# Patient Record
Sex: FEMALE | Employment: FULL TIME | URBAN - METROPOLITAN AREA
[De-identification: names, ages, dates, MRNs, and addresses within clinical notes are randomized per-mention and may not be internally consistent; named-entity substitution may affect disease eponyms.]

---

## 2017-02-15 ENCOUNTER — HOSPITAL ENCOUNTER (OUTPATIENT)
Dept: MAMMOGRAPHY | Age: 62
Discharge: HOME | End: 2017-02-15
Attending: OBSTETRICS & GYNECOLOGY
Payer: COMMERCIAL

## 2017-02-15 ENCOUNTER — PATIENT MESSAGE (OUTPATIENT)
Dept: FAMILY MEDICINE CLINIC | Facility: CLINIC | Age: 62
End: 2017-02-15

## 2017-02-15 ENCOUNTER — HOSPITAL ENCOUNTER (OUTPATIENT)
Dept: BONE DENSITY | Age: 62
Discharge: HOME | End: 2017-02-15
Attending: OBSTETRICS & GYNECOLOGY
Payer: COMMERCIAL

## 2017-02-15 DIAGNOSIS — Z13.820 SCREENING FOR OSTEOPOROSIS: ICD-10-CM

## 2017-02-15 DIAGNOSIS — Z12.31 ENCOUNTER FOR SCREENING MAMMOGRAM FOR MALIGNANT NEOPLASM OF BREAST: ICD-10-CM

## 2017-02-15 PROCEDURE — 77080 DXA BONE DENSITY AXIAL: CPT

## 2017-02-15 PROCEDURE — 77067 SCR MAMMO BI INCL CAD: CPT

## 2017-02-16 NOTE — TELEPHONE ENCOUNTER
From: Brandan Landeros  To: Holly Brito DO  Sent: 2/15/2017 9:22 AM CST  Subject: Test Results Question    I just had my mammogram and bone density test today at [facility].  My doctor is not in the [facility] group and I have requested

## 2017-06-26 ENCOUNTER — OFFICE VISIT (OUTPATIENT)
Dept: FAMILY MEDICINE CLINIC | Facility: CLINIC | Age: 62
End: 2017-06-26

## 2017-06-26 VITALS
HEIGHT: 63 IN | SYSTOLIC BLOOD PRESSURE: 120 MMHG | RESPIRATION RATE: 14 BRPM | DIASTOLIC BLOOD PRESSURE: 80 MMHG | WEIGHT: 130 LBS | BODY MASS INDEX: 23.04 KG/M2 | HEART RATE: 74 BPM

## 2017-06-26 DIAGNOSIS — H61.22 IMPACTED CERUMEN OF LEFT EAR: ICD-10-CM

## 2017-06-26 DIAGNOSIS — H69.82 EUSTACHIAN TUBE DYSFUNCTION, LEFT: Primary | ICD-10-CM

## 2017-06-26 PROCEDURE — 99213 OFFICE O/P EST LOW 20 MIN: CPT | Performed by: FAMILY MEDICINE

## 2017-06-26 PROCEDURE — 69210 REMOVE IMPACTED EAR WAX UNI: CPT | Performed by: FAMILY MEDICINE

## 2017-06-26 NOTE — PROGRESS NOTES
HPI:   Thomas Pina is a 63year old female who presents for upper respiratory symptoms for  1  months. Patient reports ear discomfort - left.       Current Outpatient Prescriptions:  spironolactone 25 MG Oral Tab TAKE 1/2 TABLET BY MOUTH DAILY Disp: 4 use: Yes           0.0 oz/week     Comment: wine 3 nights a week         REVIEW OF SYSTEMS:   GENERAL: feels well otherwise  SKIN: no rashes  EYES:denies blurred vision or double vision  HEENT: congested; sore throat, ear pain, facial pain  LUNGS: denies s

## 2017-07-11 RX ORDER — SPIRONOLACTONE 25 MG/1
TABLET ORAL
Qty: 45 TABLET | Refills: 0 | Status: SHIPPED | OUTPATIENT
Start: 2017-07-11 | End: 2017-10-12

## 2017-08-22 ENCOUNTER — OFFICE VISIT (OUTPATIENT)
Dept: FAMILY MEDICINE CLINIC | Facility: CLINIC | Age: 62
End: 2017-08-22

## 2017-08-22 VITALS
WEIGHT: 130 LBS | BODY MASS INDEX: 23.92 KG/M2 | RESPIRATION RATE: 15 BRPM | TEMPERATURE: 98 F | HEIGHT: 61.75 IN | DIASTOLIC BLOOD PRESSURE: 80 MMHG | HEART RATE: 60 BPM | SYSTOLIC BLOOD PRESSURE: 132 MMHG

## 2017-08-22 DIAGNOSIS — J45.20 MILD INTERMITTENT ASTHMA WITHOUT COMPLICATION: ICD-10-CM

## 2017-08-22 DIAGNOSIS — Z00.00 LABORATORY EXAMINATION ORDERED AS PART OF A ROUTINE GENERAL MEDICAL EXAMINATION: ICD-10-CM

## 2017-08-22 DIAGNOSIS — Z00.00 ROUTINE GENERAL MEDICAL EXAMINATION AT A HEALTH CARE FACILITY: Primary | ICD-10-CM

## 2017-08-22 DIAGNOSIS — Z13.89 SCREENING FOR GENITOURINARY CONDITION: ICD-10-CM

## 2017-08-22 LAB
APPEARANCE: CLEAR
BILIRUBIN: NEGATIVE
GLUCOSE (URINE DIPSTICK): NEGATIVE MG/DL
KETONES (URINE DIPSTICK): NEGATIVE MG/DL
LEUKOCYTES: NEGATIVE
MULTISTIX LOT#: NORMAL NUMERIC
NITRITE, URINE: NEGATIVE
OCCULT BLOOD: NEGATIVE
PH, URINE: 8.5 (ref 4.5–8)
PROTEIN (URINE DIPSTICK): NEGATIVE MG/DL
SPECIFIC GRAVITY: 1.01 (ref 1–1.03)
URINE-COLOR: YELLOW
UROBILINOGEN,SEMI-QN: 0.2 MG/DL (ref 0–1.9)

## 2017-08-22 PROCEDURE — 99396 PREV VISIT EST AGE 40-64: CPT | Performed by: FAMILY MEDICINE

## 2017-08-22 PROCEDURE — 81003 URINALYSIS AUTO W/O SCOPE: CPT | Performed by: FAMILY MEDICINE

## 2017-08-22 RX ORDER — FEXOFENADINE HCL 180 MG/1
180 TABLET ORAL DAILY PRN
COMMUNITY

## 2017-08-22 RX ORDER — MULTIVIT-MINS NO.63/IRON/FOLIC 27 MG-1 MG
1 TABLET ORAL DAILY
COMMUNITY
End: 2017-11-16 | Stop reason: ALTCHOICE

## 2017-08-22 NOTE — H&P
HPI:   Gabby Hung is a 63year old female who presents for a complete physical exam. Symptoms: is menopausal. Patient complains of having a hard time with boyfriend's family -- they want nothing to do with her.   Dr. Ad Delcid -- gyne at [facility] Re Take 180 mg by mouth daily as needed. Disp:  Rfl:    Prenatal Vit-Fe Fumarate-FA (M-VIT) Oral Tab Take 1 tablet by mouth daily.  Disp:  Rfl:    SPIRONOLACTONE 25 MG Oral Tab TAKE 1/2 TABLET BY MOUTH DAILY Disp: 45 tablet Rfl: 0   Clobetasol Propionate 0.05 otherwise  SKIN: denies any unusual skin lesions  EYES:denies blurred vision or double vision  HEENT: denies nasal congestion, sinus pain or ST  LUNGS: denies shortness of breath with exertion  CARDIOVASCULAR: denies chest pain on exertion  GI: denies abdo understanding of these issues and agrees to the plan.   Routine general medical examination at a health care facility  (primary encounter diagnosis)  Laboratory examination ordered as part of a routine general medical examination  Mild intermittent asthma w

## 2017-08-31 ENCOUNTER — LAB ENCOUNTER (OUTPATIENT)
Dept: LAB | Age: 62
End: 2017-08-31
Attending: FAMILY MEDICINE
Payer: COMMERCIAL

## 2017-08-31 DIAGNOSIS — Z00.00 LABORATORY EXAMINATION ORDERED AS PART OF A ROUTINE GENERAL MEDICAL EXAMINATION: ICD-10-CM

## 2017-08-31 LAB
25-HYDROXYVITAMIN D (TOTAL): 31.6 NG/ML (ref 30–100)
ALBUMIN SERPL-MCNC: 3.9 G/DL (ref 3.5–4.8)
ALP LIVER SERPL-CCNC: 56 U/L (ref 46–118)
ALT SERPL-CCNC: 20 U/L (ref 14–54)
AST SERPL-CCNC: 15 U/L (ref 15–41)
BASOPHILS # BLD AUTO: 0.06 X10(3) UL (ref 0–0.1)
BASOPHILS NFR BLD AUTO: 0.7 %
BILIRUB SERPL-MCNC: 1.2 MG/DL (ref 0.1–2)
BUN BLD-MCNC: 15 MG/DL (ref 8–20)
CALCIUM BLD-MCNC: 8.9 MG/DL (ref 8.3–10.3)
CHLORIDE: 105 MMOL/L (ref 101–111)
CHOLEST SMN-MCNC: 182 MG/DL (ref ?–200)
CO2: 28 MMOL/L (ref 22–32)
CREAT BLD-MCNC: 0.68 MG/DL (ref 0.55–1.02)
EOSINOPHIL # BLD AUTO: 0.17 X10(3) UL (ref 0–0.3)
EOSINOPHIL NFR BLD AUTO: 1.9 %
ERYTHROCYTE [DISTWIDTH] IN BLOOD BY AUTOMATED COUNT: 12 % (ref 11.5–16)
GLUCOSE BLD-MCNC: 89 MG/DL (ref 70–99)
HCT VFR BLD AUTO: 37.7 % (ref 34–50)
HDLC SERPL-MCNC: 67 MG/DL (ref 45–?)
HDLC SERPL: 2.72 {RATIO} (ref ?–4.44)
HGB BLD-MCNC: 11.9 G/DL (ref 12–16)
IMMATURE GRANULOCYTE COUNT: 0.02 X10(3) UL (ref 0–1)
IMMATURE GRANULOCYTE RATIO %: 0.2 %
LDLC SERPL CALC-MCNC: 99 MG/DL (ref ?–130)
LDLC SERPL-MCNC: 16 MG/DL (ref 5–40)
LYMPHOCYTES # BLD AUTO: 2.59 X10(3) UL (ref 0.9–4)
LYMPHOCYTES NFR BLD AUTO: 29.6 %
M PROTEIN MFR SERPL ELPH: 7.8 G/DL (ref 6.1–8.3)
MCH RBC QN AUTO: 27.6 PG (ref 27–33.2)
MCHC RBC AUTO-ENTMCNC: 31.6 G/DL (ref 31–37)
MCV RBC AUTO: 87.5 FL (ref 81–100)
MONOCYTES # BLD AUTO: 0.68 X10(3) UL (ref 0.1–0.6)
MONOCYTES NFR BLD AUTO: 7.8 %
NEUTROPHIL ABS PRELIM: 5.22 X10 (3) UL (ref 1.3–6.7)
NEUTROPHILS # BLD AUTO: 5.22 X10(3) UL (ref 1.3–6.7)
NEUTROPHILS NFR BLD AUTO: 59.8 %
NONHDLC SERPL-MCNC: 115 MG/DL (ref ?–130)
PLATELET # BLD AUTO: 175 10(3)UL (ref 150–450)
POTASSIUM SERPL-SCNC: 3.8 MMOL/L (ref 3.6–5.1)
RBC # BLD AUTO: 4.31 X10(6)UL (ref 3.8–5.1)
RED CELL DISTRIBUTION WIDTH-SD: 38.6 FL (ref 35.1–46.3)
SODIUM SERPL-SCNC: 140 MMOL/L (ref 136–144)
TRIGLYCERIDES: 80 MG/DL (ref ?–150)
TSI SER-ACNC: 1.83 MIU/ML (ref 0.35–5.5)
WBC # BLD AUTO: 8.7 X10(3) UL (ref 4–13)

## 2017-08-31 PROCEDURE — 36415 COLL VENOUS BLD VENIPUNCTURE: CPT | Performed by: FAMILY MEDICINE

## 2017-08-31 PROCEDURE — 82306 VITAMIN D 25 HYDROXY: CPT | Performed by: FAMILY MEDICINE

## 2017-08-31 PROCEDURE — 80050 GENERAL HEALTH PANEL: CPT | Performed by: FAMILY MEDICINE

## 2017-08-31 PROCEDURE — 80061 LIPID PANEL: CPT | Performed by: FAMILY MEDICINE

## 2017-09-01 PROBLEM — D64.9 ANEMIA: Status: ACTIVE | Noted: 2017-09-01

## 2017-10-05 ENCOUNTER — TELEPHONE (OUTPATIENT)
Dept: FAMILY MEDICINE CLINIC | Facility: CLINIC | Age: 62
End: 2017-10-05

## 2017-10-05 NOTE — TELEPHONE ENCOUNTER
Pt is is becoming a Grandma and her Daughter's Obstetrician is recommending that she have a TDAP Vaccine. She has questions regarding this vaccine. Please advise at [phone]. Thank you.

## 2017-10-05 NOTE — TELEPHONE ENCOUNTER
Left msg to ID VM     Info from [facility]:  You can provide indirect protection to your baby by making sure everyone who is around him is up to date with their whooping cough vaccine.  When your baby’s family members and caregivers get a whooping cough vaccine th

## 2017-10-09 ENCOUNTER — TELEPHONE (OUTPATIENT)
Dept: FAMILY MEDICINE CLINIC | Facility: CLINIC | Age: 62
End: 2017-10-09

## 2017-10-13 RX ORDER — SPIRONOLACTONE 25 MG/1
TABLET ORAL
Qty: 45 TABLET | Refills: 0 | Status: SHIPPED | OUTPATIENT
Start: 2017-10-13 | End: 2018-01-10

## 2017-10-18 ENCOUNTER — NURSE ONLY (OUTPATIENT)
Dept: FAMILY MEDICINE CLINIC | Facility: CLINIC | Age: 62
End: 2017-10-18

## 2017-10-18 DIAGNOSIS — Z23 NEED FOR VACCINATION: ICD-10-CM

## 2017-10-18 PROCEDURE — 90472 IMMUNIZATION ADMIN EACH ADD: CPT | Performed by: FAMILY MEDICINE

## 2017-10-18 PROCEDURE — 90471 IMMUNIZATION ADMIN: CPT | Performed by: FAMILY MEDICINE

## 2017-10-18 PROCEDURE — 90686 IIV4 VACC NO PRSV 0.5 ML IM: CPT | Performed by: FAMILY MEDICINE

## 2017-10-18 PROCEDURE — 90715 TDAP VACCINE 7 YRS/> IM: CPT | Performed by: FAMILY MEDICINE

## 2017-11-16 ENCOUNTER — OFFICE VISIT (OUTPATIENT)
Dept: FAMILY MEDICINE CLINIC | Facility: CLINIC | Age: 62
End: 2017-11-16

## 2017-11-16 VITALS
HEIGHT: 62 IN | OXYGEN SATURATION: 99 % | SYSTOLIC BLOOD PRESSURE: 120 MMHG | DIASTOLIC BLOOD PRESSURE: 72 MMHG | WEIGHT: 130 LBS | RESPIRATION RATE: 18 BRPM | TEMPERATURE: 99 F | BODY MASS INDEX: 23.92 KG/M2 | HEART RATE: 88 BPM

## 2017-11-16 DIAGNOSIS — J01.00 ACUTE MAXILLARY SINUSITIS, RECURRENCE NOT SPECIFIED: Primary | ICD-10-CM

## 2017-11-16 DIAGNOSIS — J01.00 ACUTE MAXILLARY SINUSITIS, RECURRENCE NOT SPECIFIED: ICD-10-CM

## 2017-11-16 PROCEDURE — 99213 OFFICE O/P EST LOW 20 MIN: CPT | Performed by: NURSE PRACTITIONER

## 2017-11-16 RX ORDER — FLUTICASONE PROPIONATE 50 MCG
2 SPRAY, SUSPENSION (ML) NASAL DAILY
Qty: 1 BOTTLE | Refills: 0 | Status: SHIPPED | OUTPATIENT
Start: 2017-11-16 | End: 2018-11-11

## 2017-11-16 RX ORDER — METHYLPREDNISOLONE 4 MG/1
TABLET ORAL
Qty: 1 KIT | Refills: 0 | Status: SHIPPED | OUTPATIENT
Start: 2017-11-16 | End: 2018-08-23 | Stop reason: ALTCHOICE

## 2017-11-16 RX ORDER — AMOXICILLIN AND CLAVULANATE POTASSIUM 875; 125 MG/1; MG/1
1 TABLET, FILM COATED ORAL 2 TIMES DAILY
Qty: 20 TABLET | Refills: 0 | Status: SHIPPED | OUTPATIENT
Start: 2017-11-16 | End: 2017-11-26

## 2017-11-16 RX ORDER — FLUTICASONE PROPIONATE 50 MCG
SPRAY, SUSPENSION (ML) NASAL AS NEEDED
COMMUNITY
End: 2018-08-23

## 2017-11-16 NOTE — PROGRESS NOTES
Darlene Stanley is a 63year old female. HPI:   Patient presents today reporting sinus pressure, sinus headache, slight sore throat, cough and fatigue x5 days. She reports that her symptoms are getting worse.  She has been using her netti-pot, flonase an Social History:  Smoking status: Never Smoker                                                              Smokeless tobacco: Never Used                      Alcohol use: Yes           0.0 oz/week     Comment: wine 3 nights a week        REVIEW OF SYSTEM by Each Nare route daily. methylPREDNISolone (MEDROL) 4 MG Oral Tablet Therapy Pack 1 kit 0      Sig: As directed. Imaging & Consults:  None    Follow Up with:  No follow-up provider specified.      1. Acute maxillary sinusitis, recurrence no

## 2017-11-20 RX ORDER — FLUTICASONE PROPIONATE 50 MCG
SPRAY, SUSPENSION (ML) NASAL
Refills: 0 | OUTPATIENT
Start: 2017-11-20

## 2018-01-10 RX ORDER — SPIRONOLACTONE 25 MG/1
TABLET ORAL
Qty: 45 TABLET | Refills: 1 | Status: SHIPPED | OUTPATIENT
Start: 2018-01-10 | End: 2018-08-23

## 2018-02-16 ENCOUNTER — HOSPITAL ENCOUNTER (OUTPATIENT)
Dept: MAMMOGRAPHY | Age: 63
Discharge: HOME | End: 2018-02-16
Attending: OBSTETRICS & GYNECOLOGY
Payer: COMMERCIAL

## 2018-02-16 DIAGNOSIS — Z12.31 ENCOUNTER FOR SCREENING MAMMOGRAM FOR MALIGNANT NEOPLASM OF BREAST: ICD-10-CM

## 2018-02-16 PROCEDURE — 77063 BREAST TOMOSYNTHESIS BI: CPT | Performed by: OBSTETRICS & GYNECOLOGY

## 2018-02-16 PROCEDURE — 77067 SCR MAMMO BI INCL CAD: CPT | Performed by: OBSTETRICS & GYNECOLOGY

## 2018-08-15 ENCOUNTER — OFFICE VISIT (OUTPATIENT)
Dept: FAMILY MEDICINE CLINIC | Facility: CLINIC | Age: 63
End: 2018-08-15
Payer: COMMERCIAL

## 2018-08-15 VITALS
BODY MASS INDEX: 23.92 KG/M2 | HEIGHT: 62 IN | HEART RATE: 83 BPM | RESPIRATION RATE: 15 BRPM | TEMPERATURE: 98 F | DIASTOLIC BLOOD PRESSURE: 74 MMHG | SYSTOLIC BLOOD PRESSURE: 120 MMHG | WEIGHT: 130 LBS | OXYGEN SATURATION: 98 %

## 2018-08-15 DIAGNOSIS — J06.9 ACUTE URI: Primary | ICD-10-CM

## 2018-08-15 DIAGNOSIS — J04.0 LARYNGITIS: ICD-10-CM

## 2018-08-15 PROCEDURE — 99213 OFFICE O/P EST LOW 20 MIN: CPT | Performed by: FAMILY MEDICINE

## 2018-08-15 NOTE — PROGRESS NOTES
HPI:   oTm Gramajo is a 66year old female who presents for upper respiratory symptoms for  4  days. Patient reports congestion, cough is not keeping pt up at night.       Current Outpatient Prescriptions:  SPIRONOLACTONE 25 MG Oral Tab TAKE 1/2 TABLE status: Never Smoker                                                              Smokeless tobacco: Never Used                      Alcohol use: Yes           0.0 oz/week     Comment: wine 3 nights a week         REVIEW OF SYSTEMS:   GENERAL: feels well o

## 2018-08-17 ENCOUNTER — TELEPHONE (OUTPATIENT)
Dept: FAMILY MEDICINE CLINIC | Facility: CLINIC | Age: 63
End: 2018-08-17

## 2018-08-17 RX ORDER — BENZONATATE 200 MG/1
200 CAPSULE ORAL 3 TIMES DAILY PRN
Qty: 30 CAPSULE | Refills: 0 | Status: SHIPPED | OUTPATIENT
Start: 2018-08-17 | End: 2018-08-23 | Stop reason: ALTCHOICE

## 2018-08-17 NOTE — TELEPHONE ENCOUNTER
Pt calling with an update. Had OV on 8/15. Pt is still coughing and it is keeping her up at night. Pt says it is dry cough with occasionally phlegm. Was wondering if there was anything else that could be given to help with the cough. Please advise.

## 2018-08-23 ENCOUNTER — LAB ENCOUNTER (OUTPATIENT)
Dept: LAB | Age: 63
End: 2018-08-23
Attending: FAMILY MEDICINE
Payer: COMMERCIAL

## 2018-08-23 ENCOUNTER — OFFICE VISIT (OUTPATIENT)
Dept: FAMILY MEDICINE CLINIC | Facility: CLINIC | Age: 63
End: 2018-08-23
Payer: COMMERCIAL

## 2018-08-23 VITALS
RESPIRATION RATE: 14 BRPM | DIASTOLIC BLOOD PRESSURE: 78 MMHG | HEIGHT: 62 IN | HEART RATE: 64 BPM | WEIGHT: 130 LBS | SYSTOLIC BLOOD PRESSURE: 114 MMHG | BODY MASS INDEX: 23.92 KG/M2

## 2018-08-23 DIAGNOSIS — Z13.29 SCREENING FOR ENDOCRINE, METABOLIC AND IMMUNITY DISORDER: ICD-10-CM

## 2018-08-23 DIAGNOSIS — Z00.00 LABORATORY EXAMINATION ORDERED AS PART OF A ROUTINE GENERAL MEDICAL EXAMINATION: ICD-10-CM

## 2018-08-23 DIAGNOSIS — Z13.228 SCREENING FOR ENDOCRINE, METABOLIC AND IMMUNITY DISORDER: ICD-10-CM

## 2018-08-23 DIAGNOSIS — Z00.00 ROUTINE GENERAL MEDICAL EXAMINATION AT A HEALTH CARE FACILITY: Primary | ICD-10-CM

## 2018-08-23 DIAGNOSIS — L30.9 DERMATITIS: ICD-10-CM

## 2018-08-23 DIAGNOSIS — D50.8 IRON DEFICIENCY ANEMIA SECONDARY TO INADEQUATE DIETARY IRON INTAKE: ICD-10-CM

## 2018-08-23 DIAGNOSIS — Z13.0 SCREENING FOR ENDOCRINE, METABOLIC AND IMMUNITY DISORDER: ICD-10-CM

## 2018-08-23 DIAGNOSIS — Z13.89 SCREENING FOR GENITOURINARY CONDITION: ICD-10-CM

## 2018-08-23 LAB
ALBUMIN SERPL-MCNC: 4 G/DL (ref 3.5–4.8)
ALBUMIN/GLOB SERPL: 1 {RATIO} (ref 1–2)
ALP LIVER SERPL-CCNC: 58 U/L (ref 50–130)
ALT SERPL-CCNC: 21 U/L (ref 14–54)
ANION GAP SERPL CALC-SCNC: 9 MMOL/L (ref 0–18)
AST SERPL-CCNC: 18 U/L (ref 15–41)
BASOPHILS # BLD AUTO: 0.05 X10(3) UL (ref 0–0.1)
BASOPHILS NFR BLD AUTO: 0.6 %
BILIRUB SERPL-MCNC: 1 MG/DL (ref 0.1–2)
BILIRUB UR QL STRIP.AUTO: NEGATIVE
BUN BLD-MCNC: 13 MG/DL (ref 8–20)
BUN/CREAT SERPL: 19.7 (ref 10–20)
CALCIUM BLD-MCNC: 8.9 MG/DL (ref 8.3–10.3)
CHLORIDE SERPL-SCNC: 105 MMOL/L (ref 101–111)
CHOLEST SMN-MCNC: 171 MG/DL (ref ?–200)
CLARITY UR REFRACT.AUTO: CLEAR
CO2 SERPL-SCNC: 26 MMOL/L (ref 22–32)
CREAT BLD-MCNC: 0.66 MG/DL (ref 0.55–1.02)
EOSINOPHIL # BLD AUTO: 0.14 X10(3) UL (ref 0–0.3)
EOSINOPHIL NFR BLD AUTO: 1.7 %
ERYTHROCYTE [DISTWIDTH] IN BLOOD BY AUTOMATED COUNT: 12.2 % (ref 11.5–16)
GLOBULIN PLAS-MCNC: 4 G/DL (ref 2.5–4)
GLUCOSE BLD-MCNC: 80 MG/DL (ref 70–99)
GLUCOSE UR STRIP.AUTO-MCNC: NEGATIVE MG/DL
HCT VFR BLD AUTO: 40.3 % (ref 34–50)
HDLC SERPL-MCNC: 53 MG/DL (ref 40–59)
HGB BLD-MCNC: 12.4 G/DL (ref 12–16)
IMMATURE GRANULOCYTE COUNT: 0.04 X10(3) UL (ref 0–1)
IMMATURE GRANULOCYTE RATIO %: 0.5 %
KETONES UR STRIP.AUTO-MCNC: NEGATIVE MG/DL
LDLC SERPL CALC-MCNC: 103 MG/DL (ref ?–100)
LEUKOCYTE ESTERASE UR QL STRIP.AUTO: NEGATIVE
LYMPHOCYTES # BLD AUTO: 2.82 X10(3) UL (ref 0.9–4)
LYMPHOCYTES NFR BLD AUTO: 34.2 %
M PROTEIN MFR SERPL ELPH: 8 G/DL (ref 6.1–8.3)
MCH RBC QN AUTO: 27.1 PG (ref 27–33.2)
MCHC RBC AUTO-ENTMCNC: 30.8 G/DL (ref 31–37)
MCV RBC AUTO: 88.2 FL (ref 81–100)
MONOCYTES # BLD AUTO: 0.54 X10(3) UL (ref 0.1–1)
MONOCYTES NFR BLD AUTO: 6.6 %
NEUTROPHIL ABS PRELIM: 4.65 X10 (3) UL (ref 1.3–6.7)
NEUTROPHILS # BLD AUTO: 4.65 X10(3) UL (ref 1.3–6.7)
NEUTROPHILS NFR BLD AUTO: 56.4 %
NITRITE UR QL STRIP.AUTO: NEGATIVE
NONHDLC SERPL-MCNC: 118 MG/DL (ref ?–130)
OSMOLALITY SERPL CALC.SUM OF ELEC: 289 MOSM/KG (ref 275–295)
PH UR STRIP.AUTO: 7 [PH] (ref 4.5–8)
PLATELET # BLD AUTO: 185 10(3)UL (ref 150–450)
POTASSIUM SERPL-SCNC: 3.8 MMOL/L (ref 3.6–5.1)
PROT UR STRIP.AUTO-MCNC: NEGATIVE MG/DL
RBC # BLD AUTO: 4.57 X10(6)UL (ref 3.8–5.1)
RBC UR QL AUTO: NEGATIVE
RED CELL DISTRIBUTION WIDTH-SD: 39.1 FL (ref 35.1–46.3)
RUBV IGG SER QL: POSITIVE
RUBV IGG SER-ACNC: 287.6 IU/ML (ref 10–?)
SODIUM SERPL-SCNC: 140 MMOL/L (ref 136–144)
SP GR UR STRIP.AUTO: <1.005 (ref 1–1.03)
TRIGL SERPL-MCNC: 73 MG/DL (ref 30–149)
TSI SER-ACNC: 1.45 MIU/ML (ref 0.35–5.5)
UROBILINOGEN UR STRIP.AUTO-MCNC: <2 MG/DL
VLDLC SERPL CALC-MCNC: 15 MG/DL (ref 0–30)
WBC # BLD AUTO: 8.2 X10(3) UL (ref 4–13)

## 2018-08-23 PROCEDURE — 86735 MUMPS ANTIBODY: CPT | Performed by: FAMILY MEDICINE

## 2018-08-23 PROCEDURE — 80061 LIPID PANEL: CPT | Performed by: FAMILY MEDICINE

## 2018-08-23 PROCEDURE — 99396 PREV VISIT EST AGE 40-64: CPT | Performed by: FAMILY MEDICINE

## 2018-08-23 PROCEDURE — 81003 URINALYSIS AUTO W/O SCOPE: CPT | Performed by: FAMILY MEDICINE

## 2018-08-23 PROCEDURE — 80050 GENERAL HEALTH PANEL: CPT | Performed by: FAMILY MEDICINE

## 2018-08-23 PROCEDURE — 36415 COLL VENOUS BLD VENIPUNCTURE: CPT | Performed by: FAMILY MEDICINE

## 2018-08-23 PROCEDURE — 86765 RUBEOLA ANTIBODY: CPT | Performed by: FAMILY MEDICINE

## 2018-08-23 PROCEDURE — 86762 RUBELLA ANTIBODY: CPT | Performed by: FAMILY MEDICINE

## 2018-08-23 RX ORDER — MOMETASONE FUROATE 1 MG/G
1 CREAM TOPICAL DAILY
Qty: 60 G | Refills: 1 | Status: SHIPPED | OUTPATIENT
Start: 2018-08-23 | End: 2019-10-01 | Stop reason: ALTCHOICE

## 2018-08-23 RX ORDER — SPIRONOLACTONE 25 MG/1
TABLET ORAL
Qty: 45 TABLET | Refills: 1 | Status: SHIPPED | OUTPATIENT
Start: 2018-08-23 | End: 2018-12-09

## 2018-08-23 RX ORDER — MELATONIN
325
COMMUNITY
End: 2021-10-12

## 2018-08-23 NOTE — PATIENT INSTRUCTIONS
Tagamet 150 mg 2 times daily (ranitadine)  Zantac  Give it 2-4 weeks. Nexium 40 mg daily (omeprazole)    Cont. Allergy meds.

## 2018-08-23 NOTE — H&P
HPI:   Frdia Pratt is a 66year old female who presents for a complete physical exam. Symptoms: is menopausal. Patient complains of having rash on her arms -- she has been weeding a lot in her yard. Complains of cough for the past 6 months.   Worse a Neutrophil Absolute Prelim 5.22 1.30 - 6.70 x10 (3) uL   Neutrophil Absolute 5.22 1.30 - 6.70 x10(3) uL   Lymphocyte Absolute 2.59 0.90 - 4.00 x10(3) uL   Monocyte Absolute 0.68 (H) 0.10 - 0.60 x10(3) uL   Eosinophil Absolute 0.17 0.00 - 0.30 x10(3) uL 2/13: COLONOSCOPY      Comment: due every 2 years per Dr. Lyman Petties  7/15: COLONOSCOPY  2010 ?: JEFFREY BIOPSY STEREOTACTIC NODULE 1 SITE RIGHT      Comment: benign   1965: TONSILLECTOMY   Family History   Problem Relation Age of Onset   • Asthma Father    • Cancer adenopathy,no bruits; no thyromegaly  BREAST: DEFERRED TO GYNE  LUNGS: clear to auscultation; no rhonchi, rales, or wheezing  CARDIO: RRR without murmur  GI: good BS's,no masses, HSM or tenderness  :DEFERRED TO GYNE   MUSCULOSKELETAL: back is not tender, 2016 -- Dr. Linnell Severin -- every 2 years.   Eye exam -- 12/17  Dental exam -- goes every 4 months  Osteopenia -- deferred meds; discussed diet and exercise at length  Dermatitis–advised Elocon cream for maximum of 2 weeks  Advised shingles vaccine through the phar

## 2018-08-24 LAB
MEV IGG SER IA-ACNC: POSITIVE
MUV IGG SER IA-ACNC: POSITIVE

## 2018-10-10 ENCOUNTER — APPOINTMENT (OUTPATIENT)
Dept: LAB | Age: 63
End: 2018-10-10
Attending: FAMILY MEDICINE
Payer: COMMERCIAL

## 2018-10-10 ENCOUNTER — TELEPHONE (OUTPATIENT)
Dept: FAMILY MEDICINE CLINIC | Facility: CLINIC | Age: 63
End: 2018-10-10

## 2018-10-10 ENCOUNTER — OFFICE VISIT (OUTPATIENT)
Dept: FAMILY MEDICINE CLINIC | Facility: CLINIC | Age: 63
End: 2018-10-10
Payer: COMMERCIAL

## 2018-10-10 VITALS
SYSTOLIC BLOOD PRESSURE: 150 MMHG | WEIGHT: 130 LBS | RESPIRATION RATE: 15 BRPM | DIASTOLIC BLOOD PRESSURE: 82 MMHG | TEMPERATURE: 98 F | HEIGHT: 62 IN | HEART RATE: 90 BPM | BODY MASS INDEX: 23.92 KG/M2 | OXYGEN SATURATION: 98 %

## 2018-10-10 DIAGNOSIS — R68.84 JAW PAIN: ICD-10-CM

## 2018-10-10 DIAGNOSIS — R07.89 CHEST PRESSURE: Primary | ICD-10-CM

## 2018-10-10 DIAGNOSIS — M79.602 LEFT ARM PAIN: ICD-10-CM

## 2018-10-10 DIAGNOSIS — R07.89 CHEST PRESSURE: ICD-10-CM

## 2018-10-10 DIAGNOSIS — I10 ESSENTIAL HYPERTENSION WITH GOAL BLOOD PRESSURE LESS THAN 140/90: ICD-10-CM

## 2018-10-10 PROCEDURE — 36415 COLL VENOUS BLD VENIPUNCTURE: CPT | Performed by: FAMILY MEDICINE

## 2018-10-10 PROCEDURE — 84484 ASSAY OF TROPONIN QUANT: CPT | Performed by: FAMILY MEDICINE

## 2018-10-10 PROCEDURE — 93000 ELECTROCARDIOGRAM COMPLETE: CPT | Performed by: FAMILY MEDICINE

## 2018-10-10 PROCEDURE — 80053 COMPREHEN METABOLIC PANEL: CPT | Performed by: FAMILY MEDICINE

## 2018-10-10 PROCEDURE — 99214 OFFICE O/P EST MOD 30 MIN: CPT | Performed by: FAMILY MEDICINE

## 2018-10-10 RX ORDER — RANITIDINE 150 MG/1
150 CAPSULE ORAL 2 TIMES DAILY
COMMUNITY
End: 2019-10-01 | Stop reason: ALTCHOICE

## 2018-10-10 NOTE — TELEPHONE ENCOUNTER
S/w pt. No current sx's. Reports she had tightness in her chest last night that lasted ~1-2 hrs. Reports it was hard to catch her breath at that time. No radiating pain, no numbness/tingling. No shoulder pain. No diaphoresis. No n/v.   Reports having lou

## 2018-10-10 NOTE — PROGRESS NOTES
Garrett Regalado is a 66year old female. HPI:   Patient states that last Saturday she was noting some chin/jaw discomfort and some pressure in the center of her chest.  She states she has been dealing with a lot of stress.   She had no symptoms on Sunday Take 2,000 Units by mouth daily. Disp:  Rfl:    Balsalazide Disodium (COLAZAL) 750 MG Oral Cap 2,250 mg daily. Disp:  Rfl: 3   Folic Acid 1 MG Oral Tab Take 1 Tab by mouth daily. Disp:  Rfl:      No current facility-administered medications for this visit. REFLEX TO FREE T4   Result Value Ref Range    TSH 1.450 0.350 - 5.500 mIU/mL   URINALYSIS, ROUTINE   Result Value Ref Range    Urine Color Straw Yellow    Clarity Urine Clear Clear    Spec Gravity <1.005 1.001 - 1.030    Glucose Urine Negative Negative mg/ exertion  CARDIOVASCULAR: denies chest pain on exertion; chest pain on Saturday and chin discomfort  GI: denies abdominal pain,denies heartburn  MUSCULOSKELETAL: denies back pain; left arm pain  EXTREMITIES:  No pain or numbness    EXAM:   /82 (BP Lo

## 2018-10-10 NOTE — TELEPHONE ENCOUNTER
1. What are your symptoms? Discomfort in jaw with tightness in chest      2. How long have you been having these symptoms? One day last week and then today      3. Have you done anything already to treat your symptoms?    No    ADDITIONAL INFO:   Transfer

## 2018-12-10 ENCOUNTER — OFFICE VISIT (OUTPATIENT)
Dept: FAMILY MEDICINE CLINIC | Facility: CLINIC | Age: 63
End: 2018-12-10
Payer: COMMERCIAL

## 2018-12-10 VITALS
BODY MASS INDEX: 23.92 KG/M2 | SYSTOLIC BLOOD PRESSURE: 120 MMHG | DIASTOLIC BLOOD PRESSURE: 70 MMHG | RESPIRATION RATE: 15 BRPM | TEMPERATURE: 98 F | OXYGEN SATURATION: 98 % | HEIGHT: 62 IN | HEART RATE: 102 BPM | WEIGHT: 130 LBS

## 2018-12-10 DIAGNOSIS — J01.00 ACUTE NON-RECURRENT MAXILLARY SINUSITIS: Primary | ICD-10-CM

## 2018-12-10 DIAGNOSIS — J45.901 MODERATE ASTHMA WITH ACUTE EXACERBATION, UNSPECIFIED WHETHER PERSISTENT: ICD-10-CM

## 2018-12-10 DIAGNOSIS — R05.9 COUGH: ICD-10-CM

## 2018-12-10 PROCEDURE — 99213 OFFICE O/P EST LOW 20 MIN: CPT | Performed by: FAMILY MEDICINE

## 2018-12-10 RX ORDER — AMOXICILLIN AND CLAVULANATE POTASSIUM 875; 125 MG/1; MG/1
1 TABLET, FILM COATED ORAL 2 TIMES DAILY
Qty: 20 TABLET | Refills: 0 | Status: SHIPPED | OUTPATIENT
Start: 2018-12-10 | End: 2018-12-20

## 2018-12-10 RX ORDER — ALBUTEROL SULFATE 90 UG/1
2 AEROSOL, METERED RESPIRATORY (INHALATION) EVERY 4 HOURS PRN
Qty: 6.7 G | Refills: 2 | Status: SHIPPED | OUTPATIENT
Start: 2018-12-10 | End: 2020-10-01

## 2018-12-10 RX ORDER — BENZONATATE 200 MG/1
200 CAPSULE ORAL 3 TIMES DAILY PRN
Qty: 30 CAPSULE | Refills: 0 | Status: SHIPPED | OUTPATIENT
Start: 2018-12-10 | End: 2019-02-05 | Stop reason: ALTCHOICE

## 2018-12-10 RX ORDER — SPIRONOLACTONE 25 MG/1
TABLET ORAL
Qty: 45 TABLET | Refills: 0 | Status: SHIPPED | OUTPATIENT
Start: 2018-12-10 | End: 2019-06-01

## 2019-01-02 ENCOUNTER — TELEPHONE (OUTPATIENT)
Dept: FAMILY MEDICINE CLINIC | Facility: CLINIC | Age: 64
End: 2019-01-02

## 2019-01-02 NOTE — TELEPHONE ENCOUNTER
Pt should be evaluated tomorrow- with one of the providers if they have openings. Any worsening s/s- to Immediate Care or ER tonight.

## 2019-01-02 NOTE — TELEPHONE ENCOUNTER
Call to pt-advised dr Gabbie Blanchard is out of ofc/returns 1/8.  Advised of jefferson LINCOLN recommendations noted below and explained rationale for hands on current eval.   Offered mult appt options in our ofc tomorrow and reinforced if any new/worsening symptoms befo

## 2019-01-02 NOTE — TELEPHONE ENCOUNTER
Patient was in about 6 weeks ago, Dr. Ni Ace prescribed at that time antibiotics and told her to wait to see if she got better without them. At that time it got better but since it has come back (since Saturday).  Patient is congested, cough, she filled pr

## 2019-01-02 NOTE — TELEPHONE ENCOUNTER
Pt of Dr Ivonne Garza, see note below, pt has been on augmentin x 3 days now, ventolin inhaler working with some relief but pt has hx of asthma and coughing continues. Pt would like to be seen tomorrow as she is too tired today to leave the house.

## 2019-01-03 ENCOUNTER — OFFICE VISIT (OUTPATIENT)
Dept: FAMILY MEDICINE CLINIC | Facility: CLINIC | Age: 64
End: 2019-01-03
Payer: COMMERCIAL

## 2019-01-03 VITALS
BODY MASS INDEX: 23.92 KG/M2 | HEIGHT: 62 IN | TEMPERATURE: 97 F | DIASTOLIC BLOOD PRESSURE: 80 MMHG | SYSTOLIC BLOOD PRESSURE: 120 MMHG | HEART RATE: 80 BPM | RESPIRATION RATE: 16 BRPM | WEIGHT: 130 LBS

## 2019-01-03 DIAGNOSIS — J20.9 ACUTE BRONCHITIS WITH BRONCHOSPASM: Primary | ICD-10-CM

## 2019-01-03 PROCEDURE — 99214 OFFICE O/P EST MOD 30 MIN: CPT | Performed by: FAMILY MEDICINE

## 2019-01-03 RX ORDER — PREDNISONE 20 MG/1
40 TABLET ORAL DAILY
Qty: 10 TABLET | Refills: 0 | Status: SHIPPED | OUTPATIENT
Start: 2019-01-03 | End: 2019-01-08

## 2019-01-03 NOTE — PROGRESS NOTES
HPI:   Navjot Vergara is a 66year old female who presents for upper respiratory symptoms for  4  days. Patient reports congestion, dry cough, prior history of bronchitis, prior history of sinusitis.   Patient is currently on treatment with Augmentin for 2017   •  delivery, without mention of indication, unspecified as to episode of care(359.76)    • Elevated blood pressure reading without diagnosis of hypertension    • Esophageal reflux    • Palpitations    • Personal history of urinary (tract who presents with Bronchitis with Bronchospasm. PLAN: Start prednisone 40mg daily x 5 days and finish course of augmentin. Consider double coverage with zpak if fevers develop or cough worsens  Saline Rinse. Tylenol or motrin as needed.   Antihistamine pr

## 2019-02-05 ENCOUNTER — OFFICE VISIT (OUTPATIENT)
Dept: FAMILY MEDICINE CLINIC | Facility: CLINIC | Age: 64
End: 2019-02-05
Payer: COMMERCIAL

## 2019-02-05 VITALS
DIASTOLIC BLOOD PRESSURE: 70 MMHG | WEIGHT: 130 LBS | HEART RATE: 102 BPM | SYSTOLIC BLOOD PRESSURE: 112 MMHG | BODY MASS INDEX: 23.92 KG/M2 | TEMPERATURE: 103 F | OXYGEN SATURATION: 98 % | HEIGHT: 62 IN | RESPIRATION RATE: 16 BRPM

## 2019-02-05 DIAGNOSIS — J18.9 PNEUMONIA OF LEFT LOWER LOBE DUE TO INFECTIOUS ORGANISM: Primary | ICD-10-CM

## 2019-02-05 DIAGNOSIS — R05.9 COUGH: ICD-10-CM

## 2019-02-05 DIAGNOSIS — R09.89 LUNG CRACKLES: ICD-10-CM

## 2019-02-05 PROCEDURE — 94640 AIRWAY INHALATION TREATMENT: CPT | Performed by: FAMILY MEDICINE

## 2019-02-05 PROCEDURE — 99214 OFFICE O/P EST MOD 30 MIN: CPT | Performed by: FAMILY MEDICINE

## 2019-02-05 RX ORDER — AZITHROMYCIN 250 MG/1
TABLET, FILM COATED ORAL
Qty: 6 TABLET | Refills: 0 | Status: SHIPPED | OUTPATIENT
Start: 2019-02-05 | End: 2019-03-28 | Stop reason: ALTCHOICE

## 2019-02-05 RX ORDER — ALBUTEROL SULFATE 2.5 MG/3ML
2.5 SOLUTION RESPIRATORY (INHALATION) ONCE
Status: COMPLETED | OUTPATIENT
Start: 2019-02-05 | End: 2019-02-05

## 2019-02-05 RX ORDER — METHYLPREDNISOLONE 4 MG/1
TABLET ORAL
Qty: 1 KIT | Refills: 0 | Status: SHIPPED | OUTPATIENT
Start: 2019-02-05 | End: 2019-03-28 | Stop reason: ALTCHOICE

## 2019-02-05 RX ORDER — PROMETHAZINE HYDROCHLORIDE AND CODEINE PHOSPHATE 6.25; 1 MG/5ML; MG/5ML
5 SYRUP ORAL 2 TIMES DAILY PRN
Qty: 118 ML | Refills: 0 | Status: SHIPPED | OUTPATIENT
Start: 2019-02-05 | End: 2019-04-05

## 2019-02-05 RX ADMIN — ALBUTEROL SULFATE 2.5 MG: 2.5 SOLUTION RESPIRATORY (INHALATION) at 11:01:00

## 2019-02-05 NOTE — PROGRESS NOTES
HPI:   Love Perkins is a 60year old female who presents for upper respiratory symptoms for  3  days. Patient reports congestion, fever with Tmax to 103, OTC cold meds have not been helping, prior history of bronchitis.       Current Outpatient Medicat • Palpitations    • Personal history of urinary (tract) infection    • Unspecified asthma(493.90)    • Unspecified sinusitis (chronic)       Past Surgical History:   Procedure Laterality Date   •      • COLONOSCOPY  05/14/10;     due directed. Saline Rinse. Tylenol or motrin as needed. Antihistamine prn. Fluids. Probiotics advised. Take abx with food. Side effects discussed. Deferred influenza swab.    Deferred xray of chest.     The patient indicates understanding of these issu

## 2019-02-05 NOTE — PATIENT INSTRUCTIONS
Take zpak as directed. Take medrol dose pack as directed. Take probiotic daily and for 2 weeks after antibiotics. Fluids.   If using dayquil/nyquil -- check -- I believe it has acetaminophen/Tylenol in it and may alternated with motrin every 4 hours as n

## 2019-02-07 ENCOUNTER — TELEPHONE (OUTPATIENT)
Dept: FAMILY MEDICINE CLINIC | Facility: CLINIC | Age: 64
End: 2019-02-07

## 2019-02-07 NOTE — TELEPHONE ENCOUNTER
To call pt back and she states that since she started moving around at work, she feels a little better with her back. Pt denies any UTI symptoms.   Per pt she will monitor for now and will call back if symptoms worsen or pr persist.

## 2019-02-07 NOTE — TELEPHONE ENCOUNTER
Patient states that she was diagnosed with Pneumonia. She now have lower back pain. States it is 5 or 6 out of 10. She is wondering if this is related to the pneumonia? Please advise thank you.

## 2019-03-07 ENCOUNTER — HOSPITAL ENCOUNTER (OUTPATIENT)
Dept: MAMMOGRAPHY | Age: 64
Discharge: HOME | End: 2019-03-07
Attending: OBSTETRICS & GYNECOLOGY
Payer: COMMERCIAL

## 2019-03-07 ENCOUNTER — HOSPITAL ENCOUNTER (OUTPATIENT)
Dept: BONE DENSITY | Age: 64
Discharge: HOME | End: 2019-03-07
Attending: OBSTETRICS & GYNECOLOGY
Payer: COMMERCIAL

## 2019-03-07 DIAGNOSIS — M85.9 DISORDER OF BONE DENSITY AND STRUCTURE, UNSPECIFIED: ICD-10-CM

## 2019-03-07 DIAGNOSIS — Z12.39 BREAST CANCER SCREENING: ICD-10-CM

## 2019-03-07 DIAGNOSIS — M85.80 OSTEOPENIA: ICD-10-CM

## 2019-03-07 PROCEDURE — 77063 BREAST TOMOSYNTHESIS BI: CPT | Performed by: OBSTETRICS & GYNECOLOGY

## 2019-03-07 PROCEDURE — 77080 DXA BONE DENSITY AXIAL: CPT | Performed by: OBSTETRICS & GYNECOLOGY

## 2019-03-07 PROCEDURE — 77067 SCR MAMMO BI INCL CAD: CPT | Performed by: OBSTETRICS & GYNECOLOGY

## 2019-03-28 ENCOUNTER — OFFICE VISIT (OUTPATIENT)
Dept: FAMILY MEDICINE CLINIC | Facility: CLINIC | Age: 64
End: 2019-03-28
Payer: COMMERCIAL

## 2019-03-28 VITALS
SYSTOLIC BLOOD PRESSURE: 128 MMHG | HEART RATE: 84 BPM | DIASTOLIC BLOOD PRESSURE: 82 MMHG | WEIGHT: 130 LBS | OXYGEN SATURATION: 99 % | TEMPERATURE: 99 F | HEIGHT: 62 IN | RESPIRATION RATE: 16 BRPM | BODY MASS INDEX: 23.92 KG/M2

## 2019-03-28 DIAGNOSIS — H61.22 IMPACTED CERUMEN OF LEFT EAR: ICD-10-CM

## 2019-03-28 DIAGNOSIS — Z71.85 VACCINE COUNSELING: ICD-10-CM

## 2019-03-28 DIAGNOSIS — R05.9 COUGH: Primary | ICD-10-CM

## 2019-03-28 DIAGNOSIS — J00 ACUTE RHINITIS: ICD-10-CM

## 2019-03-28 PROCEDURE — 99213 OFFICE O/P EST LOW 20 MIN: CPT | Performed by: FAMILY MEDICINE

## 2019-03-28 RX ORDER — PROMETHAZINE HYDROCHLORIDE AND CODEINE PHOSPHATE 6.25; 1 MG/5ML; MG/5ML
5 SYRUP ORAL NIGHTLY PRN
Qty: 118 ML | Refills: 0 | Status: SHIPPED | OUTPATIENT
Start: 2019-03-28 | End: 2019-04-05

## 2019-03-28 NOTE — PROGRESS NOTES
HPI:   Zulema Reeder is a 60year old female who presents for upper respiratory symptoms for  4  days. Patient reports congestion, dry cough.       Current Outpatient Medications:  promethazine-codeine 6.25-10 MG/5ML Oral Syrup Take 5 mL by mouth nightl Laterality Date   •      • COLONOSCOPY  05/14/10;     due every 2 years per Dr. Lawanda Boast   • COLONOSCOPY  7/15   • JEFFREY BIOPSY STEREOTACTIC NODULE 1 SITE RIGHT   ?     benign    • TONSILLECTOMY        Family History   Problem Relatio monitor    Cough  (primary encounter diagnosis)  Acute rhinitis  Impacted cerumen of left ear  Vaccine counseling    No orders of the defined types were placed in this encounter.       Meds & Refills for this Visit:  Requested Prescriptions     Signed Presc

## 2019-04-05 ENCOUNTER — LAB ENCOUNTER (OUTPATIENT)
Dept: LAB | Age: 64
End: 2019-04-05
Attending: NURSE PRACTITIONER
Payer: COMMERCIAL

## 2019-04-05 ENCOUNTER — OFFICE VISIT (OUTPATIENT)
Dept: FAMILY MEDICINE CLINIC | Facility: CLINIC | Age: 64
End: 2019-04-05
Payer: COMMERCIAL

## 2019-04-05 ENCOUNTER — LAB ENCOUNTER (OUTPATIENT)
Dept: LAB | Facility: HOSPITAL | Age: 64
End: 2019-04-05
Attending: NURSE PRACTITIONER
Payer: COMMERCIAL

## 2019-04-05 ENCOUNTER — TELEPHONE (OUTPATIENT)
Dept: FAMILY MEDICINE CLINIC | Facility: CLINIC | Age: 64
End: 2019-04-05

## 2019-04-05 VITALS
DIASTOLIC BLOOD PRESSURE: 70 MMHG | WEIGHT: 130 LBS | SYSTOLIC BLOOD PRESSURE: 102 MMHG | TEMPERATURE: 98 F | HEIGHT: 62 IN | BODY MASS INDEX: 23.92 KG/M2 | RESPIRATION RATE: 16 BRPM | HEART RATE: 88 BPM

## 2019-04-05 DIAGNOSIS — K21.9 GASTROESOPHAGEAL REFLUX DISEASE, ESOPHAGITIS PRESENCE NOT SPECIFIED: ICD-10-CM

## 2019-04-05 DIAGNOSIS — R19.7 DIARRHEA, UNSPECIFIED TYPE: Primary | ICD-10-CM

## 2019-04-05 DIAGNOSIS — R19.7 DIARRHEA, UNSPECIFIED TYPE: ICD-10-CM

## 2019-04-05 DIAGNOSIS — R63.0 LOSS OF APPETITE: ICD-10-CM

## 2019-04-05 PROCEDURE — 87272 CRYPTOSPORIDIUM AG IF: CPT

## 2019-04-05 PROCEDURE — 87045 FECES CULTURE AEROBIC BACT: CPT

## 2019-04-05 PROCEDURE — 87329 GIARDIA AG IA: CPT

## 2019-04-05 PROCEDURE — 87209 SMEAR COMPLEX STAIN: CPT

## 2019-04-05 PROCEDURE — 85025 COMPLETE CBC W/AUTO DIFF WBC: CPT | Performed by: NURSE PRACTITIONER

## 2019-04-05 PROCEDURE — 87046 STOOL CULTR AEROBIC BACT EA: CPT

## 2019-04-05 PROCEDURE — 99214 OFFICE O/P EST MOD 30 MIN: CPT | Performed by: NURSE PRACTITIONER

## 2019-04-05 PROCEDURE — 36415 COLL VENOUS BLD VENIPUNCTURE: CPT | Performed by: NURSE PRACTITIONER

## 2019-04-05 PROCEDURE — 82272 OCCULT BLD FECES 1-3 TESTS: CPT

## 2019-04-05 PROCEDURE — 87177 OVA AND PARASITES SMEARS: CPT

## 2019-04-05 PROCEDURE — 85652 RBC SED RATE AUTOMATED: CPT | Performed by: NURSE PRACTITIONER

## 2019-04-05 PROCEDURE — 87493 C DIFF AMPLIFIED PROBE: CPT

## 2019-04-05 PROCEDURE — 87427 SHIGA-LIKE TOXIN AG IA: CPT

## 2019-04-05 PROCEDURE — 80053 COMPREHEN METABOLIC PANEL: CPT | Performed by: NURSE PRACTITIONER

## 2019-04-05 NOTE — PROGRESS NOTES
Scott Fall is a 60year old female. HPI:   Patient presents today reporting on/off reflux and diarrhea for the past 3 days. Patient reports that she woke up on 4/3/19 with abdominal cramping and diarrhea as well as feeling chilled and fatigued.  She Rfl:    Cholecalciferol (VITAMIN D) 2000 UNITS Oral Cap Take 2,000 Units by mouth daily. Disp:  Rfl:    Balsalazide Disodium (COLAZAL) 750 MG Oral Cap 2,250 mg daily. Disp:  Rfl: 3   Folic Acid 1 MG Oral Tab Take 1 Tab by mouth daily.  Disp:  Rfl:    [place] to palpation  LUNGS: clear to auscultation no rales, rhonchi or wheezes  CARDIO: RRR without murmur  GI: Normal bowel sounds ×4 quadrant, no hepatosplenomegaly or masses, and no tenderness   EXTREMITIES: no cyanosis, clubbing or edema  Musculoskeletal: No symptoms worsen or fail to improve--await labs/stool studies for further recommendations.

## 2019-04-11 ENCOUNTER — APPOINTMENT (OUTPATIENT)
Dept: LAB | Age: 64
End: 2019-04-11
Attending: NURSE PRACTITIONER
Payer: COMMERCIAL

## 2019-04-11 DIAGNOSIS — R77.1 ELEVATED SERUM GLOBULIN LEVEL: ICD-10-CM

## 2019-04-11 DIAGNOSIS — R77.8 ELEVATED TOTAL PROTEIN: ICD-10-CM

## 2019-04-11 PROCEDURE — 83883 ASSAY NEPHELOMETRY NOT SPEC: CPT | Performed by: NURSE PRACTITIONER

## 2019-04-11 PROCEDURE — 84165 PROTEIN E-PHORESIS SERUM: CPT | Performed by: NURSE PRACTITIONER

## 2019-04-11 PROCEDURE — 86334 IMMUNOFIX E-PHORESIS SERUM: CPT | Performed by: NURSE PRACTITIONER

## 2019-04-11 PROCEDURE — 36415 COLL VENOUS BLD VENIPUNCTURE: CPT | Performed by: NURSE PRACTITIONER

## 2019-04-17 ENCOUNTER — TELEPHONE (OUTPATIENT)
Dept: FAMILY MEDICINE CLINIC | Facility: CLINIC | Age: 64
End: 2019-04-17

## 2019-04-17 NOTE — TELEPHONE ENCOUNTER
Tc to lab, they said monoclonal protein should be back end of day. Pt voiced frustration in this not being back, she also voiced frustration in getting billed for $2100 for tests she did not even know she was getting done.  Reports no one explained to her

## 2019-04-17 NOTE — TELEPHONE ENCOUNTER
Patient had blood test last Thursday 4/11/19 and would like to know results. I informed her that it was still in process but she has questions regarding that test and would like to speak with a nurse. Thank you.

## 2019-06-01 RX ORDER — SPIRONOLACTONE 25 MG/1
TABLET ORAL
Qty: 45 TABLET | Refills: 0 | Status: SHIPPED | OUTPATIENT
Start: 2019-06-01 | End: 2019-08-29

## 2019-06-21 ENCOUNTER — MED REC SCAN ONLY (OUTPATIENT)
Dept: FAMILY MEDICINE CLINIC | Facility: CLINIC | Age: 64
End: 2019-06-21

## 2019-07-19 ENCOUNTER — OFFICE VISIT (OUTPATIENT)
Dept: FAMILY MEDICINE CLINIC | Facility: CLINIC | Age: 64
End: 2019-07-19
Payer: COMMERCIAL

## 2019-07-19 VITALS
HEART RATE: 80 BPM | DIASTOLIC BLOOD PRESSURE: 76 MMHG | WEIGHT: 130 LBS | BODY MASS INDEX: 23.92 KG/M2 | HEIGHT: 62 IN | SYSTOLIC BLOOD PRESSURE: 126 MMHG | TEMPERATURE: 99 F | RESPIRATION RATE: 18 BRPM

## 2019-07-19 DIAGNOSIS — Z91.09 ENVIRONMENTAL ALLERGIES: ICD-10-CM

## 2019-07-19 DIAGNOSIS — Z87.01 HISTORY OF PNEUMONIA: ICD-10-CM

## 2019-07-19 DIAGNOSIS — H61.22 IMPACTED CERUMEN OF LEFT EAR: Primary | ICD-10-CM

## 2019-07-19 DIAGNOSIS — D50.8 OTHER IRON DEFICIENCY ANEMIA: ICD-10-CM

## 2019-07-19 DIAGNOSIS — Z23 NEED FOR VACCINATION: ICD-10-CM

## 2019-07-19 DIAGNOSIS — K21.9 GASTROESOPHAGEAL REFLUX DISEASE, ESOPHAGITIS PRESENCE NOT SPECIFIED: ICD-10-CM

## 2019-07-19 DIAGNOSIS — I10 ESSENTIAL HYPERTENSION WITH GOAL BLOOD PRESSURE LESS THAN 140/90: ICD-10-CM

## 2019-07-19 DIAGNOSIS — J45.20 MILD INTERMITTENT ASTHMA WITHOUT COMPLICATION: ICD-10-CM

## 2019-07-19 DIAGNOSIS — L98.9 SKIN LESION: ICD-10-CM

## 2019-07-19 DIAGNOSIS — Z00.00 LABORATORY EXAMINATION ORDERED AS PART OF A ROUTINE GENERAL MEDICAL EXAMINATION: ICD-10-CM

## 2019-07-19 DIAGNOSIS — Z71.85 VACCINE COUNSELING: ICD-10-CM

## 2019-07-19 PROCEDURE — 90471 IMMUNIZATION ADMIN: CPT | Performed by: FAMILY MEDICINE

## 2019-07-19 PROCEDURE — 90732 PPSV23 VACC 2 YRS+ SUBQ/IM: CPT | Performed by: FAMILY MEDICINE

## 2019-07-19 PROCEDURE — 99214 OFFICE O/P EST MOD 30 MIN: CPT | Performed by: FAMILY MEDICINE

## 2019-07-19 PROCEDURE — 69210 REMOVE IMPACTED EAR WAX UNI: CPT | Performed by: FAMILY MEDICINE

## 2019-07-19 NOTE — PROGRESS NOTES
Alfredo Ponce is a 60year old female. HPI:   Pt. Complains of left ear feeling pulugged but is seems to move. It has been occurring for the past 3-4 months. Has not tried anything for it. Brother recently passed 6 weeks ago from pneumonia.   Pt. Unspecified asthma(493.90)    • Unspecified sinusitis (chronic)       Social History:  Social History    Tobacco Use      Smoking status: Never Smoker      Smokeless tobacco: Never Used    Alcohol use:  Yes      Alcohol/week: 0.0 oz      Comment: wine 3 nig turbinates bilaterally–worse on the left  NECK: supple,no adenopathy,no bruits  LUNGS: clear to auscultation  CARDIO: RRR without murmur  GI: soft, good BS's  EXTREMITIES: no cyanosis, clubbing or edema    ASSESSMENT AND PLAN:   Skin lesion  Need for vacci

## 2019-08-20 ENCOUNTER — MED REC SCAN ONLY (OUTPATIENT)
Dept: FAMILY MEDICINE CLINIC | Facility: CLINIC | Age: 64
End: 2019-08-20

## 2019-08-29 RX ORDER — SPIRONOLACTONE 25 MG/1
TABLET ORAL
Qty: 15 TABLET | Refills: 0 | Status: SHIPPED | OUTPATIENT
Start: 2019-08-29 | End: 2019-08-29

## 2019-08-30 RX ORDER — SPIRONOLACTONE 25 MG/1
TABLET ORAL
Qty: 45 TABLET | Refills: 0 | Status: SHIPPED | OUTPATIENT
Start: 2019-08-30 | End: 2019-10-01

## 2019-09-19 ENCOUNTER — LAB ENCOUNTER (OUTPATIENT)
Dept: LAB | Age: 64
End: 2019-09-19
Attending: FAMILY MEDICINE
Payer: COMMERCIAL

## 2019-09-19 DIAGNOSIS — D50.8 OTHER IRON DEFICIENCY ANEMIA: ICD-10-CM

## 2019-09-19 DIAGNOSIS — Z00.00 LABORATORY EXAMINATION ORDERED AS PART OF A ROUTINE GENERAL MEDICAL EXAMINATION: ICD-10-CM

## 2019-09-19 LAB
ALBUMIN SERPL-MCNC: 4.1 G/DL (ref 3.4–5)
ALBUMIN/GLOB SERPL: 1.1 {RATIO} (ref 1–2)
ALP LIVER SERPL-CCNC: 58 U/L (ref 50–130)
ALT SERPL-CCNC: 20 U/L (ref 13–56)
ANION GAP SERPL CALC-SCNC: 5 MMOL/L (ref 0–18)
AST SERPL-CCNC: 16 U/L (ref 15–37)
BASOPHILS # BLD AUTO: 0.08 X10(3) UL (ref 0–0.2)
BASOPHILS NFR BLD AUTO: 0.9 %
BILIRUB SERPL-MCNC: 1.3 MG/DL (ref 0.1–2)
BUN BLD-MCNC: 12 MG/DL (ref 7–18)
BUN/CREAT SERPL: 17.4 (ref 10–20)
CALCIUM BLD-MCNC: 9 MG/DL (ref 8.5–10.1)
CHLORIDE SERPL-SCNC: 103 MMOL/L (ref 98–112)
CHOLEST SMN-MCNC: 176 MG/DL (ref ?–200)
CO2 SERPL-SCNC: 28 MMOL/L (ref 21–32)
CREAT BLD-MCNC: 0.69 MG/DL (ref 0.55–1.02)
DEPRECATED HBV CORE AB SER IA-ACNC: 237 NG/ML (ref 18–340)
DEPRECATED RDW RBC AUTO: 38 FL (ref 35.1–46.3)
EOSINOPHIL # BLD AUTO: 0.18 X10(3) UL (ref 0–0.7)
EOSINOPHIL NFR BLD AUTO: 2 %
ERYTHROCYTE [DISTWIDTH] IN BLOOD BY AUTOMATED COUNT: 11.9 % (ref 11–15)
GLOBULIN PLAS-MCNC: 3.9 G/DL (ref 2.8–4.4)
GLUCOSE BLD-MCNC: 93 MG/DL (ref 70–99)
HCT VFR BLD AUTO: 40.6 % (ref 35–48)
HDLC SERPL-MCNC: 61 MG/DL (ref 40–59)
HGB BLD-MCNC: 12.8 G/DL (ref 12–16)
IMM GRANULOCYTES # BLD AUTO: 0.02 X10(3) UL (ref 0–1)
IMM GRANULOCYTES NFR BLD: 0.2 %
IRON SATURATION: 40 % (ref 15–50)
IRON SERPL-MCNC: 143 UG/DL (ref 50–170)
LDLC SERPL CALC-MCNC: 100 MG/DL (ref ?–100)
LYMPHOCYTES # BLD AUTO: 3.21 X10(3) UL (ref 1–4)
LYMPHOCYTES NFR BLD AUTO: 36 %
M PROTEIN MFR SERPL ELPH: 8 G/DL (ref 6.4–8.2)
MCH RBC QN AUTO: 27.5 PG (ref 26–34)
MCHC RBC AUTO-ENTMCNC: 31.5 G/DL (ref 31–37)
MCV RBC AUTO: 87.1 FL (ref 80–100)
MONOCYTES # BLD AUTO: 0.68 X10(3) UL (ref 0.1–1)
MONOCYTES NFR BLD AUTO: 7.6 %
NEUTROPHILS # BLD AUTO: 4.74 X10 (3) UL (ref 1.5–7.7)
NEUTROPHILS # BLD AUTO: 4.74 X10(3) UL (ref 1.5–7.7)
NEUTROPHILS NFR BLD AUTO: 53.3 %
NONHDLC SERPL-MCNC: 115 MG/DL (ref ?–130)
OSMOLALITY SERPL CALC.SUM OF ELEC: 281 MOSM/KG (ref 275–295)
PLATELET # BLD AUTO: 177 10(3)UL (ref 150–450)
POTASSIUM SERPL-SCNC: 3.9 MMOL/L (ref 3.5–5.1)
RBC # BLD AUTO: 4.66 X10(6)UL (ref 3.8–5.3)
SODIUM SERPL-SCNC: 136 MMOL/L (ref 136–145)
TOTAL IRON BINDING CAPACITY: 361 UG/DL (ref 240–450)
TRANSFERRIN SERPL-MCNC: 242 MG/DL (ref 200–360)
TRIGL SERPL-MCNC: 73 MG/DL (ref 30–149)
TSI SER-ACNC: 2.22 MIU/ML (ref 0.36–3.74)
VLDLC SERPL CALC-MCNC: 15 MG/DL (ref 0–30)
WBC # BLD AUTO: 8.9 X10(3) UL (ref 4–11)

## 2019-09-19 PROCEDURE — 83540 ASSAY OF IRON: CPT | Performed by: FAMILY MEDICINE

## 2019-09-19 PROCEDURE — 36415 COLL VENOUS BLD VENIPUNCTURE: CPT | Performed by: FAMILY MEDICINE

## 2019-09-19 PROCEDURE — 82728 ASSAY OF FERRITIN: CPT | Performed by: FAMILY MEDICINE

## 2019-09-19 PROCEDURE — 80061 LIPID PANEL: CPT | Performed by: FAMILY MEDICINE

## 2019-09-19 PROCEDURE — 80050 GENERAL HEALTH PANEL: CPT | Performed by: FAMILY MEDICINE

## 2019-09-19 PROCEDURE — 83550 IRON BINDING TEST: CPT | Performed by: FAMILY MEDICINE

## 2019-10-01 ENCOUNTER — TELEPHONE (OUTPATIENT)
Dept: FAMILY MEDICINE CLINIC | Facility: CLINIC | Age: 64
End: 2019-10-01

## 2019-10-01 ENCOUNTER — OFFICE VISIT (OUTPATIENT)
Dept: FAMILY MEDICINE CLINIC | Facility: CLINIC | Age: 64
End: 2019-10-01
Payer: COMMERCIAL

## 2019-10-01 VITALS
WEIGHT: 129 LBS | HEIGHT: 62 IN | RESPIRATION RATE: 14 BRPM | BODY MASS INDEX: 23.74 KG/M2 | HEART RATE: 83 BPM | TEMPERATURE: 98 F | DIASTOLIC BLOOD PRESSURE: 72 MMHG | SYSTOLIC BLOOD PRESSURE: 122 MMHG | OXYGEN SATURATION: 99 %

## 2019-10-01 DIAGNOSIS — Z23 NEED FOR VACCINATION: ICD-10-CM

## 2019-10-01 DIAGNOSIS — Z00.00 ROUTINE GENERAL MEDICAL EXAMINATION AT A HEALTH CARE FACILITY: Primary | ICD-10-CM

## 2019-10-01 DIAGNOSIS — Z87.19 HISTORY OF COLITIS: ICD-10-CM

## 2019-10-01 DIAGNOSIS — K21.9 GASTROESOPHAGEAL REFLUX DISEASE, ESOPHAGITIS PRESENCE NOT SPECIFIED: ICD-10-CM

## 2019-10-01 PROCEDURE — 90471 IMMUNIZATION ADMIN: CPT | Performed by: FAMILY MEDICINE

## 2019-10-01 PROCEDURE — 90686 IIV4 VACC NO PRSV 0.5 ML IM: CPT | Performed by: FAMILY MEDICINE

## 2019-10-01 PROCEDURE — 99396 PREV VISIT EST AGE 40-64: CPT | Performed by: FAMILY MEDICINE

## 2019-10-01 RX ORDER — SPIRONOLACTONE 25 MG/1
12.5 TABLET ORAL DAILY
Qty: 45 TABLET | Refills: 1 | Status: SHIPPED | OUTPATIENT
Start: 2019-10-01 | End: 2020-06-28

## 2019-10-01 RX ORDER — CIMETIDINE 400 MG/1
400 TABLET, FILM COATED ORAL 2 TIMES DAILY
Qty: 180 TABLET | Refills: 0 | Status: SHIPPED | OUTPATIENT
Start: 2019-10-01 | End: 2020-02-10 | Stop reason: DRUGHIGH

## 2019-10-01 NOTE — TELEPHONE ENCOUNTER
Received a fax from the pharmacy stating that cimetidine is not covered by insurance. Per Dr. Ivonne Garza ok to change to Famotidine 20 mg take BID Qty: 180 w/ 0 RF.     Called pharmacy and spoke to Gary, pharmacist, to cancel the original Rx and make the ch

## 2019-10-01 NOTE — H&P
HPI:   Maria Fernanda Sorto is a 60year old female who presents for a complete physical exam. Symptoms: is menopausal. Patient complains of health scares with her children but all is well and stopped zantac and dealing with GERD.     Dr. Rigoberto Cushing -- gyne at [facility] 8.9 4.0 - 11.0 x10(3) uL    RBC 4.66 3.80 - 5.30 x10(6)uL    HGB 12.8 12.0 - 16.0 g/dL    HCT 40.6 35.0 - 48.0 %    .0 150.0 - 450.0 10(3)uL    MCV 87.1 80.0 - 100.0 fL    MCH 27.5 26.0 - 34.0 pg    MCHC 31.5 31.0 - 37.0 g/dL    RDW 11.9 11.0 - 15. 0 unspecified as to episode of care(359.70)    • Elevated blood pressure reading without diagnosis of hypertension    • Esophageal reflux    • Palpitations    • Personal history of urinary (tract) infection    • Unspecified asthma(493.90)    • Unspecified si BMI 23.59 kg/m²   Body mass index is 23.59 kg/m².    GENERAL: well developed, well nourished,in no apparent distress  SKIN:  No skin lesions  HEENT: Normocephalic, atraumatic, extraocular muscles intact, mild congestion  EYES:PERRLA, EOMI,conjunctiva are cl PRESERVATIVE FREE 0.5 ML   Colonoscopy 2019 -- Dr. Moose Angelo -- every 2 years. Advised to see Dr. Katie Rodriguez since she did not care for Dr. Moose Angelo.   Eye exam -- 12/18  Dental exam -- goes every 4 months  Osteopenia -- deferred meds; discussed diet and exercise at length

## 2019-10-02 ENCOUNTER — PATIENT MESSAGE (OUTPATIENT)
Dept: FAMILY MEDICINE CLINIC | Facility: CLINIC | Age: 64
End: 2019-10-02

## 2019-10-02 NOTE — TELEPHONE ENCOUNTER
I spoke with patient, Cimetidine and Famotidine are both NOT covered by her insurance.   Since she has to pay for both medications wondering which medication is recommended, please advise, thank you

## 2019-10-02 NOTE — TELEPHONE ENCOUNTER
From: Sheila Landeros  To: Tam Montiel DO  Sent: 10/2/2019 10:25 AM CDT  Subject: Visit Follow-up Question    Good morning.  I went to the pharmacy to  my prescriptions and they said both the medications you prescribed were over the counter and

## 2019-10-02 NOTE — TELEPHONE ENCOUNTER
I recommend that she can try either one. They are both H2 blockers just like ranitidine. I would advised that she try 1 of them and if she feels that it is not working well, changed to the other one.

## 2019-10-03 ENCOUNTER — PATIENT MESSAGE (OUTPATIENT)
Dept: FAMILY MEDICINE CLINIC | Facility: CLINIC | Age: 64
End: 2019-10-03

## 2019-11-11 ENCOUNTER — OFFICE VISIT (OUTPATIENT)
Dept: FAMILY MEDICINE CLINIC | Facility: CLINIC | Age: 64
End: 2019-11-11
Payer: COMMERCIAL

## 2019-11-11 ENCOUNTER — TELEPHONE (OUTPATIENT)
Dept: FAMILY MEDICINE CLINIC | Facility: CLINIC | Age: 64
End: 2019-11-11

## 2019-11-11 VITALS
SYSTOLIC BLOOD PRESSURE: 130 MMHG | HEART RATE: 80 BPM | TEMPERATURE: 98 F | RESPIRATION RATE: 16 BRPM | DIASTOLIC BLOOD PRESSURE: 82 MMHG

## 2019-11-11 DIAGNOSIS — J01.00 ACUTE NON-RECURRENT MAXILLARY SINUSITIS: Primary | ICD-10-CM

## 2019-11-11 DIAGNOSIS — Z79.899 MEDICATION MANAGEMENT: ICD-10-CM

## 2019-11-11 DIAGNOSIS — R05.9 COUGH: ICD-10-CM

## 2019-11-11 DIAGNOSIS — K21.9 GASTROESOPHAGEAL REFLUX DISEASE, ESOPHAGITIS PRESENCE NOT SPECIFIED: ICD-10-CM

## 2019-11-11 DIAGNOSIS — J35.8 TONSILLAR MASS: ICD-10-CM

## 2019-11-11 PROCEDURE — 99214 OFFICE O/P EST MOD 30 MIN: CPT | Performed by: FAMILY MEDICINE

## 2019-11-11 RX ORDER — AMOXICILLIN AND CLAVULANATE POTASSIUM 875; 125 MG/1; MG/1
1 TABLET, FILM COATED ORAL 2 TIMES DAILY
Qty: 20 TABLET | Refills: 0 | Status: SHIPPED | OUTPATIENT
Start: 2019-11-11 | End: 2019-11-21

## 2019-11-11 RX ORDER — METHYLPREDNISOLONE 4 MG/1
TABLET ORAL
Qty: 1 KIT | Refills: 0 | Status: SHIPPED | OUTPATIENT
Start: 2019-11-11 | End: 2020-02-11 | Stop reason: ALTCHOICE

## 2019-11-11 NOTE — TELEPHONE ENCOUNTER
Received call live to triage. Pt reports onset 11/6/19 runny nose/clear nasal drainage and sinus congestion. Started nyquil/dayquil and flonase. sts this progressed to frequent dry, unproductive deep harsh cough-keeps her awake at night.  Using tessalon pe

## 2019-11-11 NOTE — PROGRESS NOTES
HPI:   Alfredo Ponce is a 60year old female who presents for upper respiratory symptoms for  4  days. Patient reports congestion, cough is keeping pt up at night, sinus pain, wheezing, OTC cold meds have not been helping.   Patient stopped using Zantac mention of indication, unspecified as to episode of care(016.43)    • Elevated blood pressure reading without diagnosis of hypertension    • Esophageal reflux    • Palpitations    • Personal history of urinary (tract) infection    • Unspecified asthma(493. bruits  LUNGS: clear to auscultation bilaterally  CARDIO: RRR without murmur  GI: good BS's,no masses, HSM or tenderness    ASSESSMENT AND PLAN:   Tom Gramajo is a 60year old female who presents with Sinusitis.  PLAN: OTC decongestants, throat lozeng

## 2019-11-11 NOTE — TELEPHONE ENCOUNTER
1. What are your symptoms? Patient has asthma and has a cold and her cough is getting worse. 2. How long have you been having these symptoms? Cold symptoms last Wednesday. Bad cough started on Saturday    3.  Have you done anything already to shannon

## 2020-01-15 PROBLEM — J35.1 HYPERTROPHY OF TONSIL: Status: ACTIVE | Noted: 2020-01-15

## 2020-02-11 ENCOUNTER — OFFICE VISIT (OUTPATIENT)
Dept: FAMILY MEDICINE CLINIC | Facility: CLINIC | Age: 65
End: 2020-02-11
Payer: COMMERCIAL

## 2020-02-11 VITALS
TEMPERATURE: 98 F | WEIGHT: 129 LBS | HEIGHT: 62 IN | HEART RATE: 76 BPM | DIASTOLIC BLOOD PRESSURE: 82 MMHG | RESPIRATION RATE: 16 BRPM | SYSTOLIC BLOOD PRESSURE: 122 MMHG | OXYGEN SATURATION: 98 % | BODY MASS INDEX: 23.74 KG/M2

## 2020-02-11 DIAGNOSIS — I10 ESSENTIAL HYPERTENSION WITH GOAL BLOOD PRESSURE LESS THAN 140/90: ICD-10-CM

## 2020-02-11 DIAGNOSIS — M85.80 OSTEOPENIA, UNSPECIFIED LOCATION: ICD-10-CM

## 2020-02-11 DIAGNOSIS — Z91.09 ENVIRONMENTAL ALLERGIES: ICD-10-CM

## 2020-02-11 DIAGNOSIS — Z79.899 MEDICATION MANAGEMENT: ICD-10-CM

## 2020-02-11 DIAGNOSIS — J45.20 MILD INTERMITTENT ASTHMA WITHOUT COMPLICATION: ICD-10-CM

## 2020-02-11 DIAGNOSIS — D50.8 OTHER IRON DEFICIENCY ANEMIA: ICD-10-CM

## 2020-02-11 DIAGNOSIS — J35.8 TONSILLAR MASS: ICD-10-CM

## 2020-02-11 DIAGNOSIS — K21.9 GASTROESOPHAGEAL REFLUX DISEASE, ESOPHAGITIS PRESENCE NOT SPECIFIED: Primary | ICD-10-CM

## 2020-02-11 PROCEDURE — 99214 OFFICE O/P EST MOD 30 MIN: CPT | Performed by: FAMILY MEDICINE

## 2020-02-11 NOTE — PROGRESS NOTES
Alee Brand is a 63year old female. HPI:   Pt. Saw ENT -- Dr. Lynn Platt -- tonsil is growing back. Has not seen GI yet -- seeing them in 2 weeks. Cimetidine 600 mg BID. Doing well overall  -- still has the bad taste in her mouth and occ. Cough. Alcohol/week: 0.0 standard drinks      Comment: wine 3 nights a week     Drug use: No       Results for orders placed or performed in visit on 25/96/04   COMP METABOLIC PANEL (14)   Result Value Ref Range    Glucose 93 70 - 99 mg/dL    Sodium 136 136 - 145 3. 21 1.00 - 4.00 x10(3) uL    Monocyte Absolute 0.68 0.10 - 1.00 x10(3) uL    Eosinophil Absolute 0.18 0.00 - 0.70 x10(3) uL    Basophil Absolute 0.08 0.00 - 0.20 x10(3) uL    Immature Granulocyte Absolute 0.02 0.00 - 1.00 x10(3) uL    Neutrophil % 53.3 % types were placed in this encounter.       Meds & Refills for this Visit:  Requested Prescriptions      No prescriptions requested or ordered in this encounter       Imaging & Consults:  None       Gastroesophageal reflux disease, esophagitis presence not s

## 2020-02-11 NOTE — PATIENT INSTRUCTIONS
Calcium 500 mg daily + 500 mg in your diet  Vitamin D 2000 iu/day  Weight bearing exercises  Talk with Dr. Paulina Tejada regarding dexa  Consider endocrine consult if so wishes

## 2020-02-25 ENCOUNTER — MED REC SCAN ONLY (OUTPATIENT)
Dept: FAMILY MEDICINE CLINIC | Facility: CLINIC | Age: 65
End: 2020-02-25

## 2020-03-11 ENCOUNTER — HOSPITAL ENCOUNTER (OUTPATIENT)
Dept: MAMMOGRAPHY | Age: 65
Discharge: HOME | End: 2020-03-11
Attending: OBSTETRICS & GYNECOLOGY
Payer: COMMERCIAL

## 2020-03-11 DIAGNOSIS — Z12.31 ENCOUNTER FOR SCREENING MAMMOGRAM FOR MALIGNANT NEOPLASM OF BREAST: ICD-10-CM

## 2020-03-11 PROCEDURE — 77063 BREAST TOMOSYNTHESIS BI: CPT | Performed by: OBSTETRICS & GYNECOLOGY

## 2020-03-11 PROCEDURE — 77067 SCR MAMMO BI INCL CAD: CPT | Performed by: OBSTETRICS & GYNECOLOGY

## 2020-03-20 ENCOUNTER — PATIENT MESSAGE (OUTPATIENT)
Dept: FAMILY MEDICINE CLINIC | Facility: CLINIC | Age: 65
End: 2020-03-20

## 2020-03-20 NOTE — TELEPHONE ENCOUNTER
From: Jennifer Hi  To: Lexie Alexander DO  Sent: 3/20/2020 6:12 AM CDT  Subject: Non-Urgent Medical Question    I have a cough that I can’t shake. I do not believe this to be Coronavirus but want to get it under control so I am not more susceptible.  I

## 2020-03-23 ENCOUNTER — TELEPHONE (OUTPATIENT)
Dept: FAMILY MEDICINE CLINIC | Facility: CLINIC | Age: 65
End: 2020-03-23

## 2020-03-23 ENCOUNTER — PATIENT MESSAGE (OUTPATIENT)
Dept: FAMILY MEDICINE CLINIC | Facility: CLINIC | Age: 65
End: 2020-03-23

## 2020-03-23 DIAGNOSIS — R05.8 SPASMODIC COUGH: Primary | ICD-10-CM

## 2020-03-23 DIAGNOSIS — J45.20 MILD INTERMITTENT ASTHMA WITHOUT COMPLICATION: ICD-10-CM

## 2020-03-23 PROCEDURE — 99442 PHONE E/M BY PHYS 11-20 MIN: CPT | Performed by: NURSE PRACTITIONER

## 2020-03-23 RX ORDER — ALBUTEROL SULFATE 90 UG/1
2 AEROSOL, METERED RESPIRATORY (INHALATION)
Qty: 1 INHALER | Refills: 0 | Status: SHIPPED | OUTPATIENT
Start: 2020-03-23 | End: 2021-10-22

## 2020-03-23 RX ORDER — AZITHROMYCIN 250 MG/1
TABLET, FILM COATED ORAL
Qty: 6 TABLET | Refills: 0 | Status: SHIPPED | OUTPATIENT
Start: 2020-03-23 | End: 2020-08-10 | Stop reason: ALTCHOICE

## 2020-03-23 RX ORDER — METHYLPREDNISOLONE 4 MG/1
TABLET ORAL
Qty: 1 KIT | Refills: 0 | Status: SHIPPED | OUTPATIENT
Start: 2020-03-23 | End: 2020-08-10 | Stop reason: ALTCHOICE

## 2020-03-23 NOTE — TELEPHONE ENCOUNTER
Patient would like a tele-visit with provider regarding:     Patient has given consent for this visit and advised they will be called same day. Notes:    Patient did not know what tele-visit meant.  I told her that Dr. Mitali Meyer would contact her either by

## 2020-03-23 NOTE — TELEPHONE ENCOUNTER
Virtual/Telephone Check-In    Morenita Almonte verbally consents to a Virtual/Telephone Check-In service on 03/23/20. Patient understands and accepts financial responsibility for any deductible, co-insurance and/or co-pays associated with this service. Imaging & Consults:  None    Follow Up with:  No follow-up provider specified. Medrol Dosepak as ordered. Azithromycin as ordered. Discussed medications, indications and potential side effects of the medication with the patient.   No NSAIDs whi

## 2020-06-28 RX ORDER — SPIRONOLACTONE 25 MG/1
TABLET ORAL
Qty: 45 TABLET | Refills: 1 | Status: SHIPPED | OUTPATIENT
Start: 2020-06-28 | End: 2020-10-02

## 2020-08-07 ENCOUNTER — TELEPHONE (OUTPATIENT)
Dept: FAMILY MEDICINE CLINIC | Facility: CLINIC | Age: 65
End: 2020-08-07

## 2020-08-07 NOTE — TELEPHONE ENCOUNTER
1. What are your symptoms? Pt states at night she gets a fluttering feeling on r side if chest.     2. How long have you been having these symptoms? 1 week    3. Have you done anything already to treat your symptoms?          ADDITIONAL INFO:

## 2020-08-07 NOTE — TELEPHONE ENCOUNTER
Pt called and she has had right sided fluttering under her right breast and in her right axilla. She has no pain, no chest pain, no shortness of breath. This started about 2 weeks ago and only happens when she is lying on her right side at night.  I spoke w

## 2020-08-10 ENCOUNTER — OFFICE VISIT (OUTPATIENT)
Dept: FAMILY MEDICINE CLINIC | Facility: CLINIC | Age: 65
End: 2020-08-10
Payer: COMMERCIAL

## 2020-08-10 VITALS
TEMPERATURE: 98 F | BODY MASS INDEX: 22.26 KG/M2 | WEIGHT: 121 LBS | OXYGEN SATURATION: 98 % | HEART RATE: 88 BPM | HEIGHT: 62 IN | DIASTOLIC BLOOD PRESSURE: 80 MMHG | SYSTOLIC BLOOD PRESSURE: 130 MMHG | RESPIRATION RATE: 16 BRPM

## 2020-08-10 DIAGNOSIS — F43.0 STRESS REACTION: ICD-10-CM

## 2020-08-10 DIAGNOSIS — M62.838 MUSCLE SPASM: Primary | ICD-10-CM

## 2020-08-10 PROCEDURE — 3075F SYST BP GE 130 - 139MM HG: CPT | Performed by: FAMILY MEDICINE

## 2020-08-10 PROCEDURE — 3008F BODY MASS INDEX DOCD: CPT | Performed by: FAMILY MEDICINE

## 2020-08-10 PROCEDURE — 3079F DIAST BP 80-89 MM HG: CPT | Performed by: FAMILY MEDICINE

## 2020-08-10 PROCEDURE — 99213 OFFICE O/P EST LOW 20 MIN: CPT | Performed by: FAMILY MEDICINE

## 2020-08-10 NOTE — PROGRESS NOTES
Mary Moore is a 63year old female. HPI:   Pt. Complains of flutter/palpation sensation on the lateral breast on the right for the past 10 days when she lays down at night. It lasts a few seconds.   No chest pain, no shortness of breath, no nausea, Past Medical History:   Diagnosis Date   • Anemia 2017   •  delivery, without mention of indication, unspecified as to episode of care(303.60)    • Elevated blood pressure reading without diagnosis of hypertension    • Esophageal Total Iron Binding Capacity 361 240 - 450 ug/dL    % Saturation 40 15 - 50 %   CBC W/ DIFFERENTIAL   Result Value Ref Range    WBC 8.9 4.0 - 11.0 x10(3) uL    RBC 4.66 3.80 - 5.30 x10(6)uL    HGB 12.8 12.0 - 16.0 g/dL    HCT 40.6 35.0 - 48.0 %    . 0 No prescriptions requested or ordered in this encounter       Imaging & Consults:  None   Advised MVI. Push fluids. Advised muscle stretches. The patient indicates understanding of these issues and agrees to the plan.   Return if symptoms worsen or georgina

## 2020-08-10 NOTE — TELEPHONE ENCOUNTER
A little better. Fluttering only seldom and at night. No other symptoms. Was thinking K+ issue as she has had in past. Lab work? Do you still want to see if so when?

## 2020-09-10 ENCOUNTER — PATIENT MESSAGE (OUTPATIENT)
Dept: FAMILY MEDICINE CLINIC | Facility: CLINIC | Age: 65
End: 2020-09-10

## 2020-09-10 DIAGNOSIS — I10 ESSENTIAL HYPERTENSION WITH GOAL BLOOD PRESSURE LESS THAN 140/90: ICD-10-CM

## 2020-09-10 DIAGNOSIS — E55.9 VITAMIN D DEFICIENCY: ICD-10-CM

## 2020-09-10 DIAGNOSIS — Z00.00 LABORATORY EXAMINATION ORDERED AS PART OF A ROUTINE GENERAL MEDICAL EXAMINATION: Primary | ICD-10-CM

## 2020-09-10 DIAGNOSIS — D50.8 OTHER IRON DEFICIENCY ANEMIA: ICD-10-CM

## 2020-09-10 NOTE — TELEPHONE ENCOUNTER
From: Thomas Pina  To: Eduardo Chacko DO  Sent: 9/10/2020 10:34 AM CDT  Subject: Other    Les Wharton  My physical is October 1. I often get my blood work done prior to the appointment.  Do you want me to do this for the physical? Let me know so I

## 2020-09-16 ENCOUNTER — LAB ENCOUNTER (OUTPATIENT)
Dept: LAB | Age: 65
End: 2020-09-16
Attending: FAMILY MEDICINE
Payer: COMMERCIAL

## 2020-09-16 DIAGNOSIS — E55.9 VITAMIN D DEFICIENCY: ICD-10-CM

## 2020-09-16 DIAGNOSIS — I10 ESSENTIAL HYPERTENSION WITH GOAL BLOOD PRESSURE LESS THAN 140/90: ICD-10-CM

## 2020-09-16 DIAGNOSIS — Z00.00 LABORATORY EXAMINATION ORDERED AS PART OF A ROUTINE GENERAL MEDICAL EXAMINATION: ICD-10-CM

## 2020-09-16 DIAGNOSIS — D50.8 OTHER IRON DEFICIENCY ANEMIA: ICD-10-CM

## 2020-09-16 LAB
ALBUMIN SERPL-MCNC: 3.8 G/DL (ref 3.4–5)
ALBUMIN/GLOB SERPL: 1 {RATIO} (ref 1–2)
ALP LIVER SERPL-CCNC: 60 U/L (ref 50–130)
ALT SERPL-CCNC: 22 U/L (ref 13–56)
ANION GAP SERPL CALC-SCNC: 5 MMOL/L (ref 0–18)
AST SERPL-CCNC: 19 U/L (ref 15–37)
BASOPHILS # BLD AUTO: 0.08 X10(3) UL (ref 0–0.2)
BASOPHILS NFR BLD AUTO: 1 %
BILIRUB SERPL-MCNC: 1.4 MG/DL (ref 0.1–2)
BUN BLD-MCNC: 10 MG/DL (ref 7–18)
BUN/CREAT SERPL: 12.5 (ref 10–20)
CALCIUM BLD-MCNC: 9.2 MG/DL (ref 8.5–10.1)
CHLORIDE SERPL-SCNC: 104 MMOL/L (ref 98–112)
CHOLEST SMN-MCNC: 186 MG/DL (ref ?–200)
CO2 SERPL-SCNC: 28 MMOL/L (ref 21–32)
CREAT BLD-MCNC: 0.8 MG/DL (ref 0.55–1.02)
DEPRECATED RDW RBC AUTO: 36 FL (ref 35.1–46.3)
EOSINOPHIL # BLD AUTO: 0.18 X10(3) UL (ref 0–0.7)
EOSINOPHIL NFR BLD AUTO: 2.3 %
ERYTHROCYTE [DISTWIDTH] IN BLOOD BY AUTOMATED COUNT: 11.9 % (ref 11–15)
GLOBULIN PLAS-MCNC: 3.9 G/DL (ref 2.8–4.4)
GLUCOSE BLD-MCNC: 87 MG/DL (ref 70–99)
HCT VFR BLD AUTO: 39.9 % (ref 35–48)
HDLC SERPL-MCNC: 65 MG/DL (ref 40–59)
HGB BLD-MCNC: 12.9 G/DL (ref 12–16)
IMM GRANULOCYTES # BLD AUTO: 0.02 X10(3) UL (ref 0–1)
IMM GRANULOCYTES NFR BLD: 0.3 %
LDLC SERPL CALC-MCNC: 105 MG/DL (ref ?–100)
LYMPHOCYTES # BLD AUTO: 2.8 X10(3) UL (ref 1–4)
LYMPHOCYTES NFR BLD AUTO: 35 %
M PROTEIN MFR SERPL ELPH: 7.7 G/DL (ref 6.4–8.2)
MCH RBC QN AUTO: 27.2 PG (ref 26–34)
MCHC RBC AUTO-ENTMCNC: 32.3 G/DL (ref 31–37)
MCV RBC AUTO: 84 FL (ref 80–100)
MONOCYTES # BLD AUTO: 0.52 X10(3) UL (ref 0.1–1)
MONOCYTES NFR BLD AUTO: 6.5 %
NEUTROPHILS # BLD AUTO: 4.39 X10 (3) UL (ref 1.5–7.7)
NEUTROPHILS # BLD AUTO: 4.39 X10(3) UL (ref 1.5–7.7)
NEUTROPHILS NFR BLD AUTO: 54.9 %
NONHDLC SERPL-MCNC: 121 MG/DL (ref ?–130)
OSMOLALITY SERPL CALC.SUM OF ELEC: 282 MOSM/KG (ref 275–295)
PATIENT FASTING Y/N/NP: YES
PATIENT FASTING Y/N/NP: YES
PLATELET # BLD AUTO: 183 10(3)UL (ref 150–450)
POTASSIUM SERPL-SCNC: 4 MMOL/L (ref 3.5–5.1)
RBC # BLD AUTO: 4.75 X10(6)UL (ref 3.8–5.3)
SODIUM SERPL-SCNC: 137 MMOL/L (ref 136–145)
TRIGL SERPL-MCNC: 78 MG/DL (ref 30–149)
TSI SER-ACNC: 1.38 MIU/ML (ref 0.36–3.74)
VIT D+METAB SERPL-MCNC: 35.7 NG/ML (ref 30–100)
VLDLC SERPL CALC-MCNC: 16 MG/DL (ref 0–30)
WBC # BLD AUTO: 8 X10(3) UL (ref 4–11)

## 2020-09-16 PROCEDURE — 82306 VITAMIN D 25 HYDROXY: CPT | Performed by: FAMILY MEDICINE

## 2020-09-16 PROCEDURE — 80061 LIPID PANEL: CPT | Performed by: FAMILY MEDICINE

## 2020-09-16 PROCEDURE — 36415 COLL VENOUS BLD VENIPUNCTURE: CPT | Performed by: FAMILY MEDICINE

## 2020-09-16 PROCEDURE — 80050 GENERAL HEALTH PANEL: CPT | Performed by: FAMILY MEDICINE

## 2020-10-02 ENCOUNTER — OFFICE VISIT (OUTPATIENT)
Dept: FAMILY MEDICINE CLINIC | Facility: CLINIC | Age: 65
End: 2020-10-02
Payer: COMMERCIAL

## 2020-10-02 VITALS
WEIGHT: 124 LBS | HEIGHT: 62 IN | HEART RATE: 62 BPM | DIASTOLIC BLOOD PRESSURE: 70 MMHG | BODY MASS INDEX: 22.82 KG/M2 | RESPIRATION RATE: 14 BRPM | TEMPERATURE: 98 F | SYSTOLIC BLOOD PRESSURE: 130 MMHG

## 2020-10-02 DIAGNOSIS — J45.20 MILD INTERMITTENT ASTHMA WITHOUT COMPLICATION: ICD-10-CM

## 2020-10-02 DIAGNOSIS — Z13.89 SCREENING FOR GENITOURINARY CONDITION: ICD-10-CM

## 2020-10-02 DIAGNOSIS — Z00.00 ROUTINE GENERAL MEDICAL EXAMINATION AT A HEALTH CARE FACILITY: Primary | ICD-10-CM

## 2020-10-02 DIAGNOSIS — Z23 NEED FOR VACCINATION: ICD-10-CM

## 2020-10-02 DIAGNOSIS — Z00.00 LABORATORY EXAMINATION ORDERED AS PART OF A ROUTINE GENERAL MEDICAL EXAMINATION: ICD-10-CM

## 2020-10-02 PROCEDURE — 90686 IIV4 VACC NO PRSV 0.5 ML IM: CPT | Performed by: FAMILY MEDICINE

## 2020-10-02 PROCEDURE — 90471 IMMUNIZATION ADMIN: CPT | Performed by: FAMILY MEDICINE

## 2020-10-02 PROCEDURE — 99396 PREV VISIT EST AGE 40-64: CPT | Performed by: FAMILY MEDICINE

## 2020-10-02 PROCEDURE — 3008F BODY MASS INDEX DOCD: CPT | Performed by: FAMILY MEDICINE

## 2020-10-02 PROCEDURE — 3075F SYST BP GE 130 - 139MM HG: CPT | Performed by: FAMILY MEDICINE

## 2020-10-02 PROCEDURE — 81003 URINALYSIS AUTO W/O SCOPE: CPT | Performed by: FAMILY MEDICINE

## 2020-10-02 PROCEDURE — 3078F DIAST BP <80 MM HG: CPT | Performed by: FAMILY MEDICINE

## 2020-10-02 RX ORDER — SPIRONOLACTONE 25 MG/1
12.5 TABLET ORAL DAILY
Qty: 45 TABLET | Refills: 1 | Status: SHIPPED | OUTPATIENT
Start: 2020-10-02 | End: 2021-04-07

## 2020-10-02 NOTE — H&P
HPI:   Dhaval Leo is a 63year old female who presents for a complete physical exam. Symptoms: is menopausal. Patient complains of nothing.     Dr. Martinez Apo -- gyne at [address] from Last 6 Encounters:  10/02/20 : 124 lb (56.2 kg)  08/10/ History:   Diagnosis Date   • Anemia 2017   •  delivery, without mention of indication, unspecified as to episode of care(171.64)    • Elevated blood pressure reading without diagnosis of hypertension    • Esophageal reflux    • Palpitations Patient Position: Sitting, Cuff Size: adult)   Pulse 62   Temp 98.2 °F (36.8 °C) (Skin)   Resp 14   Ht 62\"   Wt 124 lb (56.2 kg)   BMI 22.68 kg/m²   Body mass index is 22.68 kg/m².    GENERAL: well developed, well nourished,in no apparent distress  SKIN: Visit:  Requested Prescriptions     Signed Prescriptions Disp Refills   • spironolactone 25 MG Oral Tab 45 tablet 1     Sig: Take 0.5 tablets (12.5 mg total) by mouth daily.    • cimetidine 200 MG Oral Tab 540 tablet 1     Sig: Take 3 tablets (600 mg total)

## 2020-10-21 ENCOUNTER — PATIENT MESSAGE (OUTPATIENT)
Dept: FAMILY MEDICINE CLINIC | Facility: CLINIC | Age: 65
End: 2020-10-21

## 2020-10-21 DIAGNOSIS — R12 HEARTBURN: Primary | ICD-10-CM

## 2020-10-21 NOTE — TELEPHONE ENCOUNTER
Can we find out if the pharmacy has possibly cimetidine 300 mg 2 tablets daily or I can bump her up to 400 mg and do 1-1/2 tablets daily.

## 2020-10-21 NOTE — TELEPHONE ENCOUNTER
From: Lulú Lock  To: Lexie Alexander DO  Sent: 10/21/2020 7:37 AM CDT  Subject: Prescription Question    Hello. I am no longer able to find cimetidine so have been using Tagamet. Not quite as effective but does help.  They will not fill the prescripti

## 2020-10-23 RX ORDER — CIMETIDINE 300 MG/1
600 TABLET, FILM COATED ORAL NIGHTLY
Qty: 60 TABLET | Refills: 3 | Status: SHIPPED | OUTPATIENT
Start: 2020-10-23 | End: 2020-12-11 | Stop reason: DRUGHIGH

## 2020-12-11 ENCOUNTER — OFFICE VISIT (OUTPATIENT)
Dept: FAMILY MEDICINE CLINIC | Facility: CLINIC | Age: 65
End: 2020-12-11
Payer: COMMERCIAL

## 2020-12-11 VITALS
SYSTOLIC BLOOD PRESSURE: 140 MMHG | DIASTOLIC BLOOD PRESSURE: 80 MMHG | RESPIRATION RATE: 16 BRPM | HEIGHT: 62 IN | HEART RATE: 104 BPM | BODY MASS INDEX: 22.82 KG/M2 | WEIGHT: 124 LBS

## 2020-12-11 DIAGNOSIS — R12 HEARTBURN: ICD-10-CM

## 2020-12-11 DIAGNOSIS — B00.1 COLD SORE: Primary | ICD-10-CM

## 2020-12-11 DIAGNOSIS — Z79.899 MEDICATION MANAGEMENT: ICD-10-CM

## 2020-12-11 DIAGNOSIS — R03.0 ELEVATED BP WITHOUT DIAGNOSIS OF HYPERTENSION: ICD-10-CM

## 2020-12-11 PROCEDURE — 3079F DIAST BP 80-89 MM HG: CPT | Performed by: FAMILY MEDICINE

## 2020-12-11 PROCEDURE — 3077F SYST BP >= 140 MM HG: CPT | Performed by: FAMILY MEDICINE

## 2020-12-11 PROCEDURE — 3008F BODY MASS INDEX DOCD: CPT | Performed by: FAMILY MEDICINE

## 2020-12-11 PROCEDURE — 99214 OFFICE O/P EST MOD 30 MIN: CPT | Performed by: FAMILY MEDICINE

## 2020-12-11 RX ORDER — CIMETIDINE 300 MG/1
600 TABLET, FILM COATED ORAL 2 TIMES DAILY
Qty: 360 TABLET | Refills: 1 | Status: SHIPPED | OUTPATIENT
Start: 2020-12-11 | End: 2021-04-07

## 2020-12-11 NOTE — PROGRESS NOTES
Zulema Reeder is a 63year old female. HPI:   PT. Complains of lip swelling for the past week and feels it is better today. She has a hx of cold sore but get them by the right corner of her lip not on the bottom right side of her lip.   Pt. Is anxious Tobacco Use      Smoking status: Never Smoker      Smokeless tobacco: Never Used    Alcohol use:  Yes      Alcohol/week: 0.0 standard drinks      Comment: wine 3 nights a week     Drug use: No       Results for orders placed or performed in visit on 10/02/2 Consults:  None     Discussed Covid and symptoms. Discussed to monitor blood pressure. Heartburn–advised cimetidine 600 mg twice daily not nightly and new prescription sent.   Cold sore–currently healing and monitor at this time  May resume spironolactone

## 2021-04-07 ENCOUNTER — TELEPHONE (OUTPATIENT)
Dept: FAMILY MEDICINE CLINIC | Facility: CLINIC | Age: 66
End: 2021-04-07

## 2021-04-07 ENCOUNTER — OFFICE VISIT (OUTPATIENT)
Dept: FAMILY MEDICINE CLINIC | Facility: CLINIC | Age: 66
End: 2021-04-07
Payer: COMMERCIAL

## 2021-04-07 VITALS
RESPIRATION RATE: 16 BRPM | HEIGHT: 63 IN | SYSTOLIC BLOOD PRESSURE: 156 MMHG | HEART RATE: 72 BPM | TEMPERATURE: 97 F | DIASTOLIC BLOOD PRESSURE: 90 MMHG | WEIGHT: 127 LBS | BODY MASS INDEX: 22.5 KG/M2

## 2021-04-07 DIAGNOSIS — E55.9 VITAMIN D DEFICIENCY: ICD-10-CM

## 2021-04-07 DIAGNOSIS — R12 HEARTBURN: ICD-10-CM

## 2021-04-07 DIAGNOSIS — Z86.19 HISTORY OF COLD SORES: ICD-10-CM

## 2021-04-07 DIAGNOSIS — J45.20 MILD INTERMITTENT ASTHMA WITHOUT COMPLICATION: Primary | ICD-10-CM

## 2021-04-07 DIAGNOSIS — F41.9 ANXIETY: ICD-10-CM

## 2021-04-07 DIAGNOSIS — D50.8 OTHER IRON DEFICIENCY ANEMIA: ICD-10-CM

## 2021-04-07 DIAGNOSIS — Z13.820 SCREENING FOR OSTEOPOROSIS: ICD-10-CM

## 2021-04-07 DIAGNOSIS — I10 ESSENTIAL HYPERTENSION: ICD-10-CM

## 2021-04-07 DIAGNOSIS — M85.80 OSTEOPENIA, UNSPECIFIED LOCATION: ICD-10-CM

## 2021-04-07 DIAGNOSIS — Z87.19 HISTORY OF COLITIS: ICD-10-CM

## 2021-04-07 DIAGNOSIS — Z91.09 ENVIRONMENTAL ALLERGIES: ICD-10-CM

## 2021-04-07 PROCEDURE — 3080F DIAST BP >= 90 MM HG: CPT | Performed by: FAMILY MEDICINE

## 2021-04-07 PROCEDURE — 3008F BODY MASS INDEX DOCD: CPT | Performed by: FAMILY MEDICINE

## 2021-04-07 PROCEDURE — 99215 OFFICE O/P EST HI 40 MIN: CPT | Performed by: FAMILY MEDICINE

## 2021-04-07 PROCEDURE — 3077F SYST BP >= 140 MM HG: CPT | Performed by: FAMILY MEDICINE

## 2021-04-07 RX ORDER — SPIRONOLACTONE 25 MG/1
12.5 TABLET ORAL DAILY
Qty: 45 TABLET | Refills: 1 | Status: SHIPPED | OUTPATIENT
Start: 2021-04-07 | End: 2021-05-17

## 2021-04-07 RX ORDER — CIMETIDINE 300 MG/1
600 TABLET, FILM COATED ORAL 2 TIMES DAILY
Qty: 360 TABLET | Refills: 1 | Status: SHIPPED | OUTPATIENT
Start: 2021-04-07 | End: 2021-04-23

## 2021-04-07 NOTE — TELEPHONE ENCOUNTER
Pt filled out medical release form to receive records from [facility] to Dr. Red Navarrete. Faxed request to [phone]; confirmation received.

## 2021-04-07 NOTE — PROGRESS NOTES
Jason Mon is a 61year old female. HPI:   PT. Is here for a med check. Sees Dr. Juanjose Peña for GI. Due to see him soon. Notes anxiety worsens nausea and burping. Asthma -- stable  Pt. Complains of tension/pressure in the back of the head at times. Vaping Use: Never used    Alcohol use:  Yes      Alcohol/week: 0.0 standard drinks      Comment: wine 3 nights a week     Drug use: No       Results for orders placed or performed in visit on 10/02/20   URINALYSIS, AUTO, W/O SCOPE   Result Value Ref Rang osteoporosis  History of cold sores  Anxiety       Orders Placed This Encounter      Comp Metabolic Panel (14) [E]      VITAMIN D, 25-HYDROXY      Meds & Refills for this Visit:  Requested Prescriptions     Signed Prescriptions Disp Refills   • cimetidine

## 2021-04-09 ENCOUNTER — LAB ENCOUNTER (OUTPATIENT)
Dept: LAB | Age: 66
End: 2021-04-09
Attending: FAMILY MEDICINE
Payer: COMMERCIAL

## 2021-04-09 DIAGNOSIS — E55.9 VITAMIN D DEFICIENCY: ICD-10-CM

## 2021-04-09 DIAGNOSIS — I10 ESSENTIAL HYPERTENSION: ICD-10-CM

## 2021-04-09 PROCEDURE — 82306 VITAMIN D 25 HYDROXY: CPT | Performed by: FAMILY MEDICINE

## 2021-04-09 PROCEDURE — 36415 COLL VENOUS BLD VENIPUNCTURE: CPT | Performed by: FAMILY MEDICINE

## 2021-04-09 PROCEDURE — 80053 COMPREHEN METABOLIC PANEL: CPT | Performed by: FAMILY MEDICINE

## 2021-04-15 ENCOUNTER — MED REC SCAN ONLY (OUTPATIENT)
Dept: FAMILY MEDICINE CLINIC | Facility: CLINIC | Age: 66
End: 2021-04-15

## 2021-04-22 ENCOUNTER — TELEPHONE (OUTPATIENT)
Dept: FAMILY MEDICINE CLINIC | Facility: CLINIC | Age: 66
End: 2021-04-22

## 2021-04-22 DIAGNOSIS — R12 HEARTBURN: Primary | ICD-10-CM

## 2021-04-22 NOTE — TELEPHONE ENCOUNTER
Received request for a PA to be completed for pt's Cimetidine 300mg tabs. Called to [facility], spoke with Marnie Ferreira. Marnie Ferreira stated that this medication is a non-formulary medication, covered alternative is Nizatidine #120/30 days.   Please advise on

## 2021-04-23 RX ORDER — NIZATIDINE 150 MG/1
150 CAPSULE ORAL 2 TIMES DAILY
Qty: 60 CAPSULE | Refills: 0 | Status: SHIPPED | OUTPATIENT
Start: 2021-04-23 | End: 2021-04-23

## 2021-04-23 RX ORDER — NIZATIDINE 150 MG/1
150 CAPSULE ORAL 2 TIMES DAILY
Qty: 180 CAPSULE | Refills: 1 | Status: SHIPPED | OUTPATIENT
Start: 2021-04-23 | End: 2021-05-05 | Stop reason: RX

## 2021-05-05 ENCOUNTER — HOSPITAL ENCOUNTER (OUTPATIENT)
Dept: BONE DENSITY | Age: 66
Discharge: HOME | End: 2021-05-05
Attending: FAMILY MEDICINE
Payer: COMMERCIAL

## 2021-05-05 ENCOUNTER — HOSPITAL ENCOUNTER (OUTPATIENT)
Dept: MAMMOGRAPHY | Age: 66
Discharge: HOME | End: 2021-05-05
Attending: OBSTETRICS & GYNECOLOGY
Payer: COMMERCIAL

## 2021-05-05 DIAGNOSIS — R12 HEARTBURN: ICD-10-CM

## 2021-05-05 DIAGNOSIS — Z12.31 ENCOUNTER FOR SCREENING MAMMOGRAM FOR MALIGNANT NEOPLASM OF BREAST: ICD-10-CM

## 2021-05-05 DIAGNOSIS — Z13.820 SCREENING FOR OSTEOPOROSIS: ICD-10-CM

## 2021-05-05 PROCEDURE — 77080 DXA BONE DENSITY AXIAL: CPT | Performed by: FAMILY MEDICINE

## 2021-05-05 PROCEDURE — 77067 SCR MAMMO BI INCL CAD: CPT | Performed by: OBSTETRICS & GYNECOLOGY

## 2021-05-05 PROCEDURE — 77063 BREAST TOMOSYNTHESIS BI: CPT | Performed by: OBSTETRICS & GYNECOLOGY

## 2021-05-05 RX ORDER — FAMOTIDINE 20 MG/1
20 TABLET ORAL 2 TIMES DAILY
Qty: 60 TABLET | Refills: 1 | Status: SHIPPED | OUTPATIENT
Start: 2021-05-05 | End: 2021-10-12

## 2021-05-05 RX ORDER — FAMOTIDINE 20 MG/1
TABLET, FILM COATED ORAL
Qty: 180 TABLET | Refills: 0 | OUTPATIENT
Start: 2021-05-05

## 2021-05-12 ENCOUNTER — OFFICE VISIT (OUTPATIENT)
Dept: FAMILY MEDICINE CLINIC | Facility: CLINIC | Age: 66
End: 2021-05-12
Payer: COMMERCIAL

## 2021-05-12 VITALS
HEART RATE: 80 BPM | WEIGHT: 127 LBS | DIASTOLIC BLOOD PRESSURE: 76 MMHG | RESPIRATION RATE: 16 BRPM | SYSTOLIC BLOOD PRESSURE: 130 MMHG | HEIGHT: 63 IN | BODY MASS INDEX: 22.5 KG/M2

## 2021-05-12 DIAGNOSIS — I10 ESSENTIAL HYPERTENSION: Primary | ICD-10-CM

## 2021-05-12 DIAGNOSIS — R12 HEARTBURN: ICD-10-CM

## 2021-05-12 DIAGNOSIS — H61.23 BILATERAL HEARING LOSS DUE TO CERUMEN IMPACTION: ICD-10-CM

## 2021-05-12 DIAGNOSIS — Z91.09 ENVIRONMENTAL ALLERGIES: ICD-10-CM

## 2021-05-12 DIAGNOSIS — R51.9 CHRONIC NONINTRACTABLE HEADACHE, UNSPECIFIED HEADACHE TYPE: ICD-10-CM

## 2021-05-12 DIAGNOSIS — H69.81 EUSTACHIAN TUBE DYSFUNCTION, RIGHT: ICD-10-CM

## 2021-05-12 DIAGNOSIS — J45.20 MILD INTERMITTENT ASTHMA WITHOUT COMPLICATION: ICD-10-CM

## 2021-05-12 DIAGNOSIS — G89.29 CHRONIC NONINTRACTABLE HEADACHE, UNSPECIFIED HEADACHE TYPE: ICD-10-CM

## 2021-05-12 DIAGNOSIS — Z79.899 MEDICATION MANAGEMENT: ICD-10-CM

## 2021-05-12 PROCEDURE — 99215 OFFICE O/P EST HI 40 MIN: CPT | Performed by: FAMILY MEDICINE

## 2021-05-12 PROCEDURE — 3078F DIAST BP <80 MM HG: CPT | Performed by: FAMILY MEDICINE

## 2021-05-12 PROCEDURE — 3075F SYST BP GE 130 - 139MM HG: CPT | Performed by: FAMILY MEDICINE

## 2021-05-12 PROCEDURE — 3008F BODY MASS INDEX DOCD: CPT | Performed by: FAMILY MEDICINE

## 2021-05-12 PROCEDURE — 69209 REMOVE IMPACTED EAR WAX UNI: CPT | Performed by: FAMILY MEDICINE

## 2021-05-12 NOTE — PROGRESS NOTES
Bev Tse is a 61year old female. HPI:   Patient is here for follow-up. Patient did stop the cimetidine to see if would help her head discomfort/headache.   Patient also complains of neck discomfort and wonders if her headaches are coming from he Seasonal                   Past Medical History:   Diagnosis Date   • Anemia 2017   •  delivery, without mention of indication, unspecified as to episode of care(675.27)    • Elevated blood pressure reading without diagnosis of hypertension denies dysuria  MUSCULOSKELETAL: denies back pain  NEURO: denies headaches  PSYCHE: denies depression or anxiety  HEMATOLOGIC: denies hx of anemia  ENDOCRINE: denies thyroid history  ALL/ASTHMA: denies hx of allergy or asthma    EXAM:   /76 (BP Locat helps improve her headache but if it continues, advised CT of the brain    Length of visit/charting–45 minutes      The patient indicates understanding of these issues and agrees to the plan. Return in about 2 months (around 7/12/2021) for HTN check. Patty Asencio

## 2021-05-13 ENCOUNTER — TELEPHONE (OUTPATIENT)
Dept: FAMILY MEDICINE CLINIC | Facility: CLINIC | Age: 66
End: 2021-05-13

## 2021-05-17 RX ORDER — SPIRONOLACTONE 25 MG/1
25 TABLET ORAL DAILY
Qty: 90 TABLET | Refills: 0 | Status: SHIPPED | OUTPATIENT
Start: 2021-05-17 | End: 2021-08-10

## 2021-05-17 NOTE — TELEPHONE ENCOUNTER
Pt states her spironolactone 25 MG Oral Tab was changed to 1 tab daily so she needs refill sent to [address], [address] AT [place] [address], [phone], [phone] thank you.  She has 1 day left of medic

## 2021-05-17 NOTE — TELEPHONE ENCOUNTER
Med refill request noted. Can see messg from pt 4/13/2021 w BP readings and dr luda Mayorga Cors[place]ana taking whole pill and monitor BP. \" me name not mentioned in that note  Call to pt-sts \"at 4/7/21 OV dr Loreto Hernandez felt BP was a little too high (156/90), r

## 2021-05-26 ENCOUNTER — PATIENT MESSAGE (OUTPATIENT)
Dept: FAMILY MEDICINE CLINIC | Facility: CLINIC | Age: 66
End: 2021-05-26

## 2021-05-26 NOTE — TELEPHONE ENCOUNTER
From: Arcadio Lau  To: Lexie Alexander DO  Sent: 5/26/2021 6:07 AM CDT  Subject: Visit Follow-up Question    Good morning. As I continue to monitor my blood pressure I see it going down quite a bit. This morning it was 110/75.  Most mornings it is in th

## 2021-08-10 RX ORDER — SPIRONOLACTONE 25 MG/1
TABLET ORAL
Qty: 90 TABLET | Refills: 0 | Status: SHIPPED | OUTPATIENT
Start: 2021-08-10 | End: 2021-10-12

## 2021-08-25 ENCOUNTER — HOSPITAL ENCOUNTER (OUTPATIENT)
Dept: CT IMAGING | Facility: HOSPITAL | Age: 66
Discharge: HOME | End: 2021-08-25
Attending: FAMILY MEDICINE
Payer: COMMERCIAL

## 2021-08-25 DIAGNOSIS — R51.9 CHRONIC NONINTRACTABLE HEADACHE, UNSPECIFIED HEADACHE TYPE: ICD-10-CM

## 2021-08-25 DIAGNOSIS — G89.29 CHRONIC NONINTRACTABLE HEADACHE, UNSPECIFIED HEADACHE TYPE: ICD-10-CM

## 2021-08-25 PROCEDURE — 70450 CT HEAD/BRAIN W/O DYE: CPT | Performed by: FAMILY MEDICINE

## 2021-08-31 ENCOUNTER — MED REC SCAN ONLY (OUTPATIENT)
Dept: FAMILY MEDICINE CLINIC | Facility: CLINIC | Age: 66
End: 2021-08-31

## 2021-10-12 ENCOUNTER — LAB ENCOUNTER (OUTPATIENT)
Dept: LAB | Age: 66
End: 2021-10-12
Attending: FAMILY MEDICINE
Payer: COMMERCIAL

## 2021-10-12 ENCOUNTER — OFFICE VISIT (OUTPATIENT)
Dept: FAMILY MEDICINE CLINIC | Facility: CLINIC | Age: 66
End: 2021-10-12
Payer: COMMERCIAL

## 2021-10-12 VITALS
RESPIRATION RATE: 16 BRPM | SYSTOLIC BLOOD PRESSURE: 120 MMHG | WEIGHT: 130 LBS | DIASTOLIC BLOOD PRESSURE: 70 MMHG | HEART RATE: 68 BPM | BODY MASS INDEX: 23.62 KG/M2 | HEIGHT: 62.25 IN

## 2021-10-12 DIAGNOSIS — Z13.89 SCREENING FOR GENITOURINARY CONDITION: ICD-10-CM

## 2021-10-12 DIAGNOSIS — M85.80 OSTEOPENIA, UNSPECIFIED LOCATION: ICD-10-CM

## 2021-10-12 DIAGNOSIS — Z00.00 LABORATORY EXAMINATION ORDERED AS PART OF A ROUTINE GENERAL MEDICAL EXAMINATION: ICD-10-CM

## 2021-10-12 DIAGNOSIS — E55.9 VITAMIN D DEFICIENCY: ICD-10-CM

## 2021-10-12 DIAGNOSIS — Z91.09 ENVIRONMENTAL ALLERGIES: ICD-10-CM

## 2021-10-12 DIAGNOSIS — J45.20 MILD INTERMITTENT ASTHMA WITHOUT COMPLICATION: ICD-10-CM

## 2021-10-12 DIAGNOSIS — F41.9 ANXIETY: ICD-10-CM

## 2021-10-12 DIAGNOSIS — Z86.19 HISTORY OF COLD SORES: ICD-10-CM

## 2021-10-12 DIAGNOSIS — D50.8 OTHER IRON DEFICIENCY ANEMIA: ICD-10-CM

## 2021-10-12 DIAGNOSIS — I10 ESSENTIAL HYPERTENSION: ICD-10-CM

## 2021-10-12 DIAGNOSIS — Z87.19 HISTORY OF COLITIS: ICD-10-CM

## 2021-10-12 DIAGNOSIS — Z00.00 ROUTINE GENERAL MEDICAL EXAMINATION AT A HEALTH CARE FACILITY: Primary | ICD-10-CM

## 2021-10-12 PROCEDURE — 99396 PREV VISIT EST AGE 40-64: CPT | Performed by: FAMILY MEDICINE

## 2021-10-12 PROCEDURE — 83540 ASSAY OF IRON: CPT | Performed by: FAMILY MEDICINE

## 2021-10-12 PROCEDURE — 80050 GENERAL HEALTH PANEL: CPT | Performed by: FAMILY MEDICINE

## 2021-10-12 PROCEDURE — 83550 IRON BINDING TEST: CPT | Performed by: FAMILY MEDICINE

## 2021-10-12 PROCEDURE — 3008F BODY MASS INDEX DOCD: CPT | Performed by: FAMILY MEDICINE

## 2021-10-12 PROCEDURE — 3078F DIAST BP <80 MM HG: CPT | Performed by: FAMILY MEDICINE

## 2021-10-12 PROCEDURE — 82728 ASSAY OF FERRITIN: CPT | Performed by: FAMILY MEDICINE

## 2021-10-12 PROCEDURE — 3074F SYST BP LT 130 MM HG: CPT | Performed by: FAMILY MEDICINE

## 2021-10-12 PROCEDURE — 81003 URINALYSIS AUTO W/O SCOPE: CPT | Performed by: FAMILY MEDICINE

## 2021-10-12 PROCEDURE — 82306 VITAMIN D 25 HYDROXY: CPT | Performed by: FAMILY MEDICINE

## 2021-10-12 PROCEDURE — 80061 LIPID PANEL: CPT | Performed by: FAMILY MEDICINE

## 2021-10-12 RX ORDER — SPIRONOLACTONE 25 MG/1
25 TABLET ORAL DAILY
Qty: 90 TABLET | Refills: 1 | Status: SHIPPED | OUTPATIENT
Start: 2021-10-12

## 2021-10-12 NOTE — PROGRESS NOTES
Dear Doron Lentz,    Your blood work and urine are stable except that your cholesterol, the LDL, the bad cholesterol did jump to 130.   It would be a good idea to do the ultrafast heart scan in the peripheral vascular screen that I gave you information on today in

## 2021-10-12 NOTE — H&P
HPI:   Mary Moore is a 61year old female who presents for a complete physical exam. Symptoms: is menopausal. Patient complains of  Feeling a numb sensation in her left great toe region when walking barefoot.    She wonders if she has vascular diseas Inhaler 0   • Fexofenadine HCl 180 MG Oral Tab Take 180 mg by mouth daily as needed.      • ValACYclovir HCl 1 G Oral Tab Take 2 tablets by mouth twice a day as directed,  for outbeaks 16 tablet 1   • Cholecalciferol (VITAMIN D) 2000 UNITS Oral Cap Take 2,0 exertion  CARDIOVASCULAR: denies chest pain on exertion  GI: denies abdominal pain,denies heartburn -- controlled with cimetidine  : denies dysuria, vaginal discharge or itching,periods -menopause  Age 48  MUSCULOSKELETAL: denies back pain  NEURO: denies hypertension  Mild intermittent asthma without complication  Other iron deficiency anemia  Vitamin d deficiency  Osteopenia, unspecified location  Anxiety  History of colitis  History of cold sores  Environmental allergies  Routine general medical examinat

## 2021-10-19 ENCOUNTER — PATIENT MESSAGE (OUTPATIENT)
Dept: FAMILY MEDICINE CLINIC | Facility: CLINIC | Age: 66
End: 2021-10-19

## 2021-10-19 NOTE — TELEPHONE ENCOUNTER
From: Frida Pratt  To: Katya Garsia DO  Sent: 10/19/2021 1:27 PM CDT  Subject: Blood type     How can I find my blood type. Is it in my file.      Penelope Clifton

## 2021-10-22 ENCOUNTER — OFFICE VISIT (OUTPATIENT)
Dept: FAMILY MEDICINE CLINIC | Facility: CLINIC | Age: 66
End: 2021-10-22
Payer: COMMERCIAL

## 2021-10-22 VITALS
RESPIRATION RATE: 20 BRPM | DIASTOLIC BLOOD PRESSURE: 88 MMHG | HEART RATE: 85 BPM | TEMPERATURE: 99 F | HEIGHT: 62 IN | SYSTOLIC BLOOD PRESSURE: 122 MMHG | OXYGEN SATURATION: 96 % | WEIGHT: 129.38 LBS | BODY MASS INDEX: 23.81 KG/M2

## 2021-10-22 DIAGNOSIS — J45.20 MILD INTERMITTENT ASTHMA WITHOUT COMPLICATION: ICD-10-CM

## 2021-10-22 DIAGNOSIS — J06.9 VIRAL URI WITH COUGH: Primary | ICD-10-CM

## 2021-10-22 DIAGNOSIS — Z20.822 ENCOUNTER FOR LABORATORY TESTING FOR COVID-19 VIRUS: ICD-10-CM

## 2021-10-22 PROCEDURE — 3074F SYST BP LT 130 MM HG: CPT | Performed by: NURSE PRACTITIONER

## 2021-10-22 PROCEDURE — 99213 OFFICE O/P EST LOW 20 MIN: CPT | Performed by: NURSE PRACTITIONER

## 2021-10-22 PROCEDURE — 3079F DIAST BP 80-89 MM HG: CPT | Performed by: NURSE PRACTITIONER

## 2021-10-22 PROCEDURE — 3008F BODY MASS INDEX DOCD: CPT | Performed by: NURSE PRACTITIONER

## 2021-10-22 RX ORDER — ALBUTEROL SULFATE 90 UG/1
2 AEROSOL, METERED RESPIRATORY (INHALATION)
Qty: 1 EACH | Refills: 0 | Status: SHIPPED | OUTPATIENT
Start: 2021-10-22

## 2021-10-22 NOTE — PATIENT INSTRUCTIONS
Adult Self-Care for Colds  Colds are caused by viruses. They can't be cured with antibiotics. But you can ease symptoms and support your body's efforts to heal itself.  No matter which symptoms you have, be sure to:  · Drink plenty of fluids (water or lashell should not have. When to call your healthcare provider  When you first notice symptoms, ask your provider if you should take antiviral medicines. Antibiotics should not be taken for colds or flu.  Also call your provider if you have any of these symptoms recommendations. If you test positive for COVID-19, you should notify your family and friends with whom you have had close contact recently (starting 2 days before you first had symptoms). What counts as close contact?     * You were within 6 feet of s places. If you must go out, avoid using any kind of public transportation, ridesharing, or taxis. 2. Monitor your symptoms carefully. If your symptoms get worse, call your healthcare provider immediately. 3. Get rest and stay hydrated.    4. If you have following the below guidelines:  • At least 24 hours have passed since recovery defined as resolution of fever without the use of fever-reducing medications; and  · Improvement in respiratory symptoms (e.g., cough, shortness of breath); and  · At least 10 donate convalescent plasma? The process for donating plasma is very similar to donating blood. [facility] (a large blood research institute in [address] [facility] and one of [facility]’s blood product suppliers) is coordinating plasma donations.     If you wou following symptoms:    Persistent severe fatigue Brain fog or trouble concentrating   Headaches Sleeping difficulties   Anxiety or depression Loss of taste or smell   Chest pain  Palpitations Light headedness  Muscle Pain   Increased Heart Rate Tingling, n

## 2021-10-22 NOTE — PROGRESS NOTES
CHIEF COMPLAINT:   Patient presents with:  Cold: mucus throat, cough,stuffy nose       HPI:   Maria Fernanda Sorto is a 61year old female who presents for upper respiratory symptoms. Reports feeling mucus in throat with mild cough x 3- 4 days.   Reports nose Acid 1 MG Oral Tab Take 1 tablet by mouth daily.         Past Medical History:   Diagnosis Date   • Anemia 2017   •  delivery, without mention of indication, unspecified as to episode of care(431.10)    • Elevated blood pressure reading without non labored. Coughed once during exam.  CARDIO: RRR without murmur  GI: active BS's x4,no masses, hepatosplenomegaly, or tenderness on direct palpation  EXTREMITIES: no cyanosis, clubbing or edema  LYMPH:  No cervical lymphadenopathy.         ASSESSMENT AND your temperature several times a day. If your fever is  100.4° F ( 38°C ) for more than a day, call your healthcare provider. · Relax, lie down. Go to bed if you want. Just get off your feet and rest. Also drink plenty of fluids to prevent dehydration.   · improvement  · Fever of  100.4° F  ( 38°C) or higher, or fever that doesn't go down with medicine, or as advised by your healthcare provider  · Dizziness or weakness  · Slight shortness of breath or wheezing  · Spotted, red, or very sore throat  · Signs of direct physical contact with the person (touched, hugged, or kissed them)  * You shared eating or drinking utensils  * They sneezed, coughed, or somehow got respiratory droplets on you    Reducing the length of quarantine may make it easier for people to q medical emergencies, call 911 and notify the dispatch personnel that you have or may have COVID-19.   6. Cover your cough and sneezes.    7. Wash your hands often with soap and water for at least 20 seconds or clean your hands with an alcohol-based hand canseco days post COVID test date. · At least 20 days have passed for severe illness (requiring hospitalization) OR if you are immunocompromised.   If you have a fever with cough or shortness of breath but have not been exposed to someone with COVID-19 and have n on their website: ContactWire.be    Who is eligible to donate convalescent plasma?     Potential convalescent plasma donors must:    · Have had a confirmed diagnosis of COVID-19  · Be symptom-free for at least 14 Who is at risk for a Post-COVID condition? It is still too early to say for sure. Currently, we find the people who experience Post-COVID conditions to be random.   Researchers are trying to identify similarities between people with a Post-COVID condi

## 2021-11-06 ENCOUNTER — IMMUNIZATION (OUTPATIENT)
Dept: LAB | Facility: HOSPITAL | Age: 66
End: 2021-11-06
Attending: EMERGENCY MEDICINE
Payer: COMMERCIAL

## 2021-11-06 DIAGNOSIS — Z23 NEED FOR VACCINATION: Primary | ICD-10-CM

## 2021-11-06 PROCEDURE — 0064A SARSCOV2 VAC 50MCG/0.25ML IM: CPT

## 2021-11-17 ENCOUNTER — OFFICE VISIT (OUTPATIENT)
Dept: FAMILY MEDICINE CLINIC | Facility: CLINIC | Age: 66
End: 2021-11-17
Payer: COMMERCIAL

## 2021-11-17 VITALS
SYSTOLIC BLOOD PRESSURE: 120 MMHG | WEIGHT: 131 LBS | DIASTOLIC BLOOD PRESSURE: 74 MMHG | RESPIRATION RATE: 16 BRPM | HEART RATE: 84 BPM | HEIGHT: 62 IN | BODY MASS INDEX: 24.11 KG/M2 | TEMPERATURE: 97 F

## 2021-11-17 DIAGNOSIS — L71.9 ROSACEA, ACNE: Primary | ICD-10-CM

## 2021-11-17 DIAGNOSIS — Z79.899 MEDICATION MANAGEMENT: ICD-10-CM

## 2021-11-17 DIAGNOSIS — K21.9 GASTROESOPHAGEAL REFLUX DISEASE, UNSPECIFIED WHETHER ESOPHAGITIS PRESENT: ICD-10-CM

## 2021-11-17 DIAGNOSIS — Z71.85 VACCINE COUNSELING: ICD-10-CM

## 2021-11-17 PROCEDURE — 3074F SYST BP LT 130 MM HG: CPT | Performed by: FAMILY MEDICINE

## 2021-11-17 PROCEDURE — 3078F DIAST BP <80 MM HG: CPT | Performed by: FAMILY MEDICINE

## 2021-11-17 PROCEDURE — 99214 OFFICE O/P EST MOD 30 MIN: CPT | Performed by: FAMILY MEDICINE

## 2021-11-17 PROCEDURE — 3008F BODY MASS INDEX DOCD: CPT | Performed by: FAMILY MEDICINE

## 2021-11-17 RX ORDER — CIMETIDINE 800 MG
800 TABLET ORAL 2 TIMES DAILY
Qty: 180 TABLET | Refills: 0 | Status: SHIPPED | OUTPATIENT
Start: 2021-11-17 | End: 2021-12-02

## 2021-11-17 NOTE — PROGRESS NOTES
Didier Durant is a 61year old female. HPI:   Patient is here for rosacea? On her face on her nose and in the creases next to her nose. She states she has a family history of rosacea.   She has noticed this redness since the past few months and wonder Smoker      Smokeless tobacco: Never Used    Vaping Use      Vaping Use: Never used    Alcohol use:  Yes      Alcohol/week: 0.0 standard drinks      Comment: wine 3 nights a week     Drug use: No       Results for orders placed or performed in visit on 10/2 her to start cimetidine 800 mg twice daily for 3 months to heal her reflux symptoms. She does have some old cimetidine at home so if the insurance does not cover the 800 mg, I advised to start with 600 mg twice a day.       Length of visit/charting - great

## 2021-12-02 ENCOUNTER — PATIENT MESSAGE (OUTPATIENT)
Dept: FAMILY MEDICINE CLINIC | Facility: CLINIC | Age: 66
End: 2021-12-02

## 2021-12-02 DIAGNOSIS — R12 HEARTBURN: ICD-10-CM

## 2021-12-02 RX ORDER — CIMETIDINE 300 MG/1
TABLET, FILM COATED ORAL
Qty: 360 TABLET | Refills: 0 | Status: SHIPPED | OUTPATIENT
Start: 2021-12-02

## 2021-12-02 RX ORDER — CIMETIDINE 300 MG/1
TABLET, FILM COATED ORAL
Qty: 180 TABLET | Refills: 0 | Status: SHIPPED | OUTPATIENT
Start: 2021-12-02 | End: 2021-12-02

## 2021-12-02 NOTE — TELEPHONE ENCOUNTER
From: Scott Fall  To: Lexie Alexander DO  Sent: 12/2/2021 6:00 AM CST  Subject: Cimetidine    Hello     I am currently continuing the cimetidine I had which is 600 mg. I went to [facility] and the new prescription you sent is 800 mg.  The 600 two pills

## 2021-12-13 ENCOUNTER — PATIENT MESSAGE (OUTPATIENT)
Dept: FAMILY MEDICINE CLINIC | Facility: CLINIC | Age: 66
End: 2021-12-13

## 2021-12-13 RX ORDER — METRONIDAZOLE 10 MG/G
1 GEL TOPICAL DAILY
Qty: 60 G | Refills: 1 | Status: SHIPPED | OUTPATIENT
Start: 2021-12-13 | End: 2022-04-12

## 2021-12-13 NOTE — TELEPHONE ENCOUNTER
From: Dhaval Leo  To: Lexie Alexander DO  Sent: 12/13/2021 8:34 AM CST  Subject: Ointment on nose     I have been using the packets of ointment for the redness and muñoz type bumps on my nose. I am about to start the last packet.  While I have seen

## 2021-12-13 NOTE — TELEPHONE ENCOUNTER
Lets try MetroGel–daily and sent prescription to her pharmacy since there might be underlying rosacea.

## 2021-12-13 NOTE — TELEPHONE ENCOUNTER
Started using bacitracin ointment daily since LOV  Stated some improvement but not totally resolved if to continue or recommend different med  Pls advise  Thank you

## 2022-02-27 ENCOUNTER — PATIENT MESSAGE (OUTPATIENT)
Dept: FAMILY MEDICINE CLINIC | Facility: CLINIC | Age: 67
End: 2022-02-27

## 2022-02-28 NOTE — TELEPHONE ENCOUNTER
Spoke to patient and the pharmacist. They will special order this medication and , told Josh Johnson to call them. Insurance will not cover another 90-day, however, it is only $7.70 at [facility] with [employer] coupon. Patient made aware and she will proceed with this.

## 2022-03-03 ENCOUNTER — HOSPITAL ENCOUNTER (OUTPATIENT)
Dept: CT IMAGING | Facility: HOSPITAL | Age: 67
Discharge: HOME | End: 2022-03-03
Attending: FAMILY MEDICINE

## 2022-03-03 DIAGNOSIS — Z13.6 SCREENING FOR CARDIOVASCULAR CONDITION: ICD-10-CM

## 2022-03-03 PROBLEM — K44.9 HIATAL HERNIA: Status: ACTIVE | Noted: 2022-03-03

## 2022-03-03 NOTE — PROGRESS NOTES
Dear Jc Clarke,     This is an over read for the non-cardiac, non-vascular limited visualized portions of the chest and abdomen from your CT calcium scoring test. Everything is within normal limits. It did note that you have a small hiatal hernia. It is nothing of concern and we monitor. This test results comes back before the heart scan result. Please be patient for that result. I will send it over once I have received and reviewed it.      Sincerely,    Dr. Catarina Juarez

## 2022-04-12 ENCOUNTER — TELEMEDICINE (OUTPATIENT)
Dept: FAMILY MEDICINE CLINIC | Facility: CLINIC | Age: 67
End: 2022-04-12
Payer: MEDICARE

## 2022-04-12 DIAGNOSIS — G47.00 INSOMNIA, UNSPECIFIED TYPE: ICD-10-CM

## 2022-04-12 DIAGNOSIS — K44.9 HIATAL HERNIA: ICD-10-CM

## 2022-04-12 DIAGNOSIS — Z12.31 ENCOUNTER FOR SCREENING MAMMOGRAM FOR MALIGNANT NEOPLASM OF BREAST: ICD-10-CM

## 2022-04-12 DIAGNOSIS — J01.00 ACUTE NON-RECURRENT MAXILLARY SINUSITIS: Primary | ICD-10-CM

## 2022-04-12 DIAGNOSIS — K21.9 GASTROESOPHAGEAL REFLUX DISEASE, UNSPECIFIED WHETHER ESOPHAGITIS PRESENT: ICD-10-CM

## 2022-04-12 DIAGNOSIS — R93.1 ABNORMAL CT SCAN OF HEART: ICD-10-CM

## 2022-04-12 DIAGNOSIS — I10 ESSENTIAL HYPERTENSION: ICD-10-CM

## 2022-04-12 DIAGNOSIS — Z87.19 HISTORY OF COLITIS: ICD-10-CM

## 2022-04-12 DIAGNOSIS — I25.10 CORONARY ARTERY DISEASE INVOLVING NATIVE CORONARY ARTERY OF NATIVE HEART WITHOUT ANGINA PECTORIS: ICD-10-CM

## 2022-04-12 DIAGNOSIS — J45.20 MILD INTERMITTENT ASTHMA WITHOUT COMPLICATION: ICD-10-CM

## 2022-04-12 DIAGNOSIS — Z79.899 MEDICATION MANAGEMENT: ICD-10-CM

## 2022-04-12 DIAGNOSIS — F41.9 ANXIETY: ICD-10-CM

## 2022-04-12 PROCEDURE — 99214 OFFICE O/P EST MOD 30 MIN: CPT | Performed by: FAMILY MEDICINE

## 2022-04-12 RX ORDER — BENZONATATE 200 MG/1
200 CAPSULE ORAL 3 TIMES DAILY PRN
Qty: 30 CAPSULE | Refills: 0 | Status: SHIPPED | OUTPATIENT
Start: 2022-04-12

## 2022-04-12 RX ORDER — DOXYCYCLINE HYCLATE 100 MG/1
100 CAPSULE ORAL 2 TIMES DAILY
Qty: 20 CAPSULE | Refills: 0 | Status: SHIPPED | OUTPATIENT
Start: 2022-04-12

## 2022-04-12 RX ORDER — ATORVASTATIN CALCIUM 10 MG/1
10 TABLET, FILM COATED ORAL NIGHTLY
Qty: 30 TABLET | Refills: 3 | Status: SHIPPED | OUTPATIENT
Start: 2022-04-12

## 2022-04-12 RX ORDER — SPIRONOLACTONE 25 MG/1
25 TABLET ORAL DAILY
Qty: 90 TABLET | Refills: 1 | Status: SHIPPED | OUTPATIENT
Start: 2022-04-12

## 2022-04-13 ENCOUNTER — PATIENT MESSAGE (OUTPATIENT)
Dept: FAMILY MEDICINE CLINIC | Facility: CLINIC | Age: 67
End: 2022-04-13

## 2022-04-13 RX ORDER — METHYLPREDNISOLONE 4 MG/1
TABLET ORAL
Qty: 1 EACH | Refills: 0 | Status: SHIPPED | OUTPATIENT
Start: 2022-04-13

## 2022-04-13 NOTE — TELEPHONE ENCOUNTER
From: Albertina Lynn  To: Lexie Alexander DO  Sent: 4/13/2022 8:03 AM CDT  Subject: Cough     My cough got worse last night. I think I am at the point where I need prednisone.  Can you send a prescription over to [facility]. Thank you

## 2022-04-25 RX ORDER — CIMETIDINE 300 MG/1
TABLET, FILM COATED ORAL
Qty: 240 TABLET | Refills: 0 | Status: SHIPPED | OUTPATIENT
Start: 2022-04-25

## 2022-04-25 NOTE — TELEPHONE ENCOUNTER
Last refill 12/2/21-started 600 mg BID because ins.would not cover 800 BID.   Quantity 360  LOV 11/17/21 this re-started, BP check 10/12/21, video visit 4/12/22-[facility] 6/22/22

## 2022-05-11 ENCOUNTER — HOSPITAL ENCOUNTER (OUTPATIENT)
Dept: MAMMOGRAPHY | Age: 67
Discharge: HOME | End: 2022-05-11
Attending: FAMILY MEDICINE
Payer: MEDICARE

## 2022-05-11 DIAGNOSIS — Z12.31 ENCOUNTER FOR SCREENING MAMMOGRAM FOR MALIGNANT NEOPLASM OF BREAST: ICD-10-CM

## 2022-05-11 PROCEDURE — 77067 SCR MAMMO BI INCL CAD: CPT | Performed by: FAMILY MEDICINE

## 2022-05-11 PROCEDURE — 77063 BREAST TOMOSYNTHESIS BI: CPT | Performed by: FAMILY MEDICINE

## 2022-05-16 ENCOUNTER — IMMUNIZATION (OUTPATIENT)
Dept: LAB | Age: 67
End: 2022-05-16
Attending: EMERGENCY MEDICINE
Payer: MEDICARE

## 2022-05-16 DIAGNOSIS — Z23 NEED FOR VACCINATION: Primary | ICD-10-CM

## 2022-05-16 PROCEDURE — 0064A SARSCOV2 VAC 50MCG/0.25ML IM: CPT

## 2022-06-21 DIAGNOSIS — K21.9 GASTROESOPHAGEAL REFLUX DISEASE, UNSPECIFIED WHETHER ESOPHAGITIS PRESENT: ICD-10-CM

## 2022-06-21 RX ORDER — CIMETIDINE 300 MG/1
TABLET, FILM COATED ORAL
Qty: 360 TABLET | Refills: 1 | Status: SHIPPED | OUTPATIENT
Start: 2022-06-21

## 2022-06-22 ENCOUNTER — OFFICE VISIT (OUTPATIENT)
Dept: FAMILY MEDICINE CLINIC | Facility: CLINIC | Age: 67
End: 2022-06-22
Payer: MEDICARE

## 2022-06-22 VITALS
HEIGHT: 62.5 IN | OXYGEN SATURATION: 98 % | BODY MASS INDEX: 23.33 KG/M2 | DIASTOLIC BLOOD PRESSURE: 70 MMHG | WEIGHT: 130 LBS | HEART RATE: 77 BPM | RESPIRATION RATE: 16 BRPM | TEMPERATURE: 98 F | SYSTOLIC BLOOD PRESSURE: 114 MMHG

## 2022-06-22 DIAGNOSIS — D50.8 OTHER IRON DEFICIENCY ANEMIA: ICD-10-CM

## 2022-06-22 DIAGNOSIS — Z86.19 HISTORY OF HERPES GENITALIS: ICD-10-CM

## 2022-06-22 DIAGNOSIS — Z86.39 HISTORY OF HYPOTHYROIDISM: ICD-10-CM

## 2022-06-22 DIAGNOSIS — Z12.11 SCREENING FOR COLON CANCER: ICD-10-CM

## 2022-06-22 DIAGNOSIS — Z79.899 MEDICATION MANAGEMENT: ICD-10-CM

## 2022-06-22 DIAGNOSIS — J45.20 MILD INTERMITTENT ASTHMA WITHOUT COMPLICATION: ICD-10-CM

## 2022-06-22 DIAGNOSIS — Z00.00 ENCOUNTER FOR ANNUAL HEALTH EXAMINATION: Primary | ICD-10-CM

## 2022-06-22 DIAGNOSIS — Z87.19 HISTORY OF COLITIS: ICD-10-CM

## 2022-06-22 DIAGNOSIS — Z71.85 VACCINE COUNSELING: ICD-10-CM

## 2022-06-22 DIAGNOSIS — F41.9 ANXIETY: ICD-10-CM

## 2022-06-22 DIAGNOSIS — Z91.09 ENVIRONMENTAL ALLERGIES: ICD-10-CM

## 2022-06-22 DIAGNOSIS — M85.80 OSTEOPENIA, UNSPECIFIED LOCATION: ICD-10-CM

## 2022-06-22 DIAGNOSIS — L92.0 GRANULOMA ANNULARE: ICD-10-CM

## 2022-06-22 DIAGNOSIS — B36.0 TINEA VERSICOLOR: ICD-10-CM

## 2022-06-22 DIAGNOSIS — L71.9 ROSACEA, ACNE: ICD-10-CM

## 2022-06-22 DIAGNOSIS — I25.10 CORONARY ARTERY DISEASE INVOLVING NATIVE CORONARY ARTERY OF NATIVE HEART WITHOUT ANGINA PECTORIS: ICD-10-CM

## 2022-06-22 DIAGNOSIS — E55.9 VITAMIN D DEFICIENCY: ICD-10-CM

## 2022-06-22 DIAGNOSIS — I10 ESSENTIAL HYPERTENSION: ICD-10-CM

## 2022-06-22 DIAGNOSIS — Z86.19 HISTORY OF COLD SORES: ICD-10-CM

## 2022-06-22 DIAGNOSIS — G47.00 INSOMNIA, UNSPECIFIED TYPE: ICD-10-CM

## 2022-06-22 DIAGNOSIS — K44.9 HIATAL HERNIA: ICD-10-CM

## 2022-06-22 DIAGNOSIS — K21.9 GASTROESOPHAGEAL REFLUX DISEASE, UNSPECIFIED WHETHER ESOPHAGITIS PRESENT: ICD-10-CM

## 2022-06-22 DIAGNOSIS — R93.1 ABNORMAL CT SCAN OF HEART: ICD-10-CM

## 2022-06-22 DIAGNOSIS — Z11.59 NEED FOR HEPATITIS C SCREENING TEST: ICD-10-CM

## 2022-06-22 PROBLEM — H61.23 BILATERAL HEARING LOSS DUE TO CERUMEN IMPACTION: Status: ACTIVE | Noted: 2022-06-22

## 2022-06-22 RX ORDER — AMOXICILLIN 500 MG/1
CAPSULE ORAL
COMMUNITY
Start: 2022-06-21

## 2022-06-24 ENCOUNTER — LAB ENCOUNTER (OUTPATIENT)
Dept: LAB | Age: 67
End: 2022-06-24
Attending: FAMILY MEDICINE
Payer: MEDICARE

## 2022-06-24 DIAGNOSIS — D50.8 OTHER IRON DEFICIENCY ANEMIA: ICD-10-CM

## 2022-06-24 DIAGNOSIS — I10 ESSENTIAL HYPERTENSION: ICD-10-CM

## 2022-06-24 DIAGNOSIS — Z11.59 NEED FOR HEPATITIS C SCREENING TEST: ICD-10-CM

## 2022-06-24 DIAGNOSIS — E55.9 VITAMIN D DEFICIENCY: ICD-10-CM

## 2022-06-24 DIAGNOSIS — Z86.39 HISTORY OF HYPOTHYROIDISM: ICD-10-CM

## 2022-06-24 LAB
BASOPHILS # BLD AUTO: 0.05 X10(3) UL (ref 0–0.2)
BASOPHILS NFR BLD AUTO: 0.7 %
BILIRUB UR QL STRIP.AUTO: NEGATIVE
CLARITY UR REFRACT.AUTO: CLEAR
COLOR UR AUTO: YELLOW
DEPRECATED HBV CORE AB SER IA-ACNC: 187.8 NG/ML
EOSINOPHIL # BLD AUTO: 0.15 X10(3) UL (ref 0–0.7)
EOSINOPHIL NFR BLD AUTO: 2.1 %
ERYTHROCYTE [DISTWIDTH] IN BLOOD BY AUTOMATED COUNT: 11.9 %
GLUCOSE UR STRIP.AUTO-MCNC: NEGATIVE MG/DL
HCT VFR BLD AUTO: 39.9 %
HCV AB SERPL QL IA: NONREACTIVE
HGB BLD-MCNC: 12.3 G/DL
IMM GRANULOCYTES # BLD AUTO: 0.02 X10(3) UL (ref 0–1)
IMM GRANULOCYTES NFR BLD: 0.3 %
IRON SATN MFR SERPL: 33 %
IRON SERPL-MCNC: 103 UG/DL
KETONES UR STRIP.AUTO-MCNC: NEGATIVE MG/DL
LYMPHOCYTES # BLD AUTO: 2.38 X10(3) UL (ref 1–4)
LYMPHOCYTES NFR BLD AUTO: 32.6 %
MCH RBC QN AUTO: 27.5 PG (ref 26–34)
MCHC RBC AUTO-ENTMCNC: 30.8 G/DL (ref 31–37)
MCV RBC AUTO: 89.1 FL
MONOCYTES # BLD AUTO: 0.53 X10(3) UL (ref 0.1–1)
MONOCYTES NFR BLD AUTO: 7.3 %
NEUTROPHILS # BLD AUTO: 4.16 X10 (3) UL (ref 1.5–7.7)
NEUTROPHILS # BLD AUTO: 4.16 X10(3) UL (ref 1.5–7.7)
NEUTROPHILS NFR BLD AUTO: 57 %
NITRITE UR QL STRIP.AUTO: NEGATIVE
PH UR STRIP.AUTO: 5 [PH] (ref 5–8)
PLATELET # BLD AUTO: 168 10(3)UL (ref 150–450)
PROT UR STRIP.AUTO-MCNC: NEGATIVE MG/DL
RBC # BLD AUTO: 4.48 X10(6)UL
RBC UR QL AUTO: NEGATIVE
SP GR UR STRIP.AUTO: 1.01 (ref 1–1.03)
T4 FREE SERPL-MCNC: 0.9 NG/DL (ref 0.8–1.7)
THYROGLOB SERPL-MCNC: <15 U/ML (ref ?–60)
THYROPEROXIDASE AB SERPL-ACNC: <28 U/ML (ref ?–60)
TIBC SERPL-MCNC: 310 UG/DL (ref 240–450)
TRANSFERRIN SERPL-MCNC: 208 MG/DL (ref 200–360)
TSI SER-ACNC: 1.27 MIU/ML (ref 0.36–3.74)
UROBILINOGEN UR STRIP.AUTO-MCNC: <2 MG/DL
VIT D+METAB SERPL-MCNC: 40.8 NG/ML (ref 30–100)
WBC # BLD AUTO: 7.3 X10(3) UL (ref 4–11)

## 2022-06-24 PROCEDURE — 82306 VITAMIN D 25 HYDROXY: CPT

## 2022-06-24 PROCEDURE — 84439 ASSAY OF FREE THYROXINE: CPT

## 2022-06-24 PROCEDURE — 86803 HEPATITIS C AB TEST: CPT

## 2022-06-24 PROCEDURE — 81001 URINALYSIS AUTO W/SCOPE: CPT

## 2022-06-24 PROCEDURE — 36415 COLL VENOUS BLD VENIPUNCTURE: CPT

## 2022-06-24 PROCEDURE — 86800 THYROGLOBULIN ANTIBODY: CPT

## 2022-06-24 PROCEDURE — 83550 IRON BINDING TEST: CPT

## 2022-06-24 PROCEDURE — 86376 MICROSOMAL ANTIBODY EACH: CPT

## 2022-06-24 PROCEDURE — 83540 ASSAY OF IRON: CPT

## 2022-06-24 PROCEDURE — 84443 ASSAY THYROID STIM HORMONE: CPT

## 2022-06-24 PROCEDURE — 85025 COMPLETE CBC W/AUTO DIFF WBC: CPT

## 2022-06-24 PROCEDURE — 82728 ASSAY OF FERRITIN: CPT

## 2022-06-24 NOTE — PROGRESS NOTES
Dear Heron Damian,    Your blood work and urine are within normal limits.     Sincerely,  Dr. Funmilayo Aguilar

## 2022-06-27 ENCOUNTER — PATIENT MESSAGE (OUTPATIENT)
Dept: FAMILY MEDICINE CLINIC | Facility: CLINIC | Age: 67
End: 2022-06-27

## 2022-06-30 ENCOUNTER — PATIENT MESSAGE (OUTPATIENT)
Dept: FAMILY MEDICINE CLINIC | Facility: CLINIC | Age: 67
End: 2022-06-30

## 2022-07-06 LAB — HPV I/H RISK 1 DNA SPEC QL NAA+PROBE: NEGATIVE

## 2022-07-08 ENCOUNTER — MED REC SCAN ONLY (OUTPATIENT)
Dept: FAMILY MEDICINE CLINIC | Facility: CLINIC | Age: 67
End: 2022-07-08

## 2022-07-28 ENCOUNTER — LAB ENCOUNTER (OUTPATIENT)
Dept: LAB | Age: 67
End: 2022-07-28
Attending: FAMILY MEDICINE
Payer: MEDICARE

## 2022-07-28 DIAGNOSIS — I10 ESSENTIAL HYPERTENSION: ICD-10-CM

## 2022-07-28 DIAGNOSIS — I25.10 CORONARY ARTERY DISEASE INVOLVING NATIVE CORONARY ARTERY OF NATIVE HEART WITHOUT ANGINA PECTORIS: ICD-10-CM

## 2022-07-28 DIAGNOSIS — R93.1 ABNORMAL CT SCAN OF HEART: ICD-10-CM

## 2022-07-28 LAB
ALBUMIN SERPL-MCNC: 3.9 G/DL (ref 3.4–5)
ALBUMIN/GLOB SERPL: 1.2 {RATIO} (ref 1–2)
ALP LIVER SERPL-CCNC: 59 U/L
ALT SERPL-CCNC: 19 U/L
ANION GAP SERPL CALC-SCNC: 4 MMOL/L (ref 0–18)
AST SERPL-CCNC: 14 U/L (ref 15–37)
BILIRUB SERPL-MCNC: 1.3 MG/DL (ref 0.1–2)
BUN BLD-MCNC: 11 MG/DL (ref 7–18)
CALCIUM BLD-MCNC: 8.9 MG/DL (ref 8.5–10.1)
CHLORIDE SERPL-SCNC: 106 MMOL/L (ref 98–112)
CHOLEST SERPL-MCNC: 135 MG/DL (ref ?–200)
CO2 SERPL-SCNC: 28 MMOL/L (ref 21–32)
CREAT BLD-MCNC: 0.77 MG/DL
FASTING PATIENT LIPID ANSWER: YES
FASTING STATUS PATIENT QL REPORTED: YES
GLOBULIN PLAS-MCNC: 3.3 G/DL (ref 2.8–4.4)
GLUCOSE BLD-MCNC: 91 MG/DL (ref 70–99)
HDLC SERPL-MCNC: 56 MG/DL (ref 40–59)
LDLC SERPL CALC-MCNC: 65 MG/DL (ref ?–100)
NONHDLC SERPL-MCNC: 79 MG/DL (ref ?–130)
OSMOLALITY SERPL CALC.SUM OF ELEC: 285 MOSM/KG (ref 275–295)
POTASSIUM SERPL-SCNC: 4 MMOL/L (ref 3.5–5.1)
PROT SERPL-MCNC: 7.2 G/DL (ref 6.4–8.2)
SODIUM SERPL-SCNC: 138 MMOL/L (ref 136–145)
TRIGL SERPL-MCNC: 69 MG/DL (ref 30–149)
VLDLC SERPL CALC-MCNC: 10 MG/DL (ref 0–30)

## 2022-07-28 PROCEDURE — 80053 COMPREHEN METABOLIC PANEL: CPT

## 2022-07-28 PROCEDURE — 36415 COLL VENOUS BLD VENIPUNCTURE: CPT

## 2022-07-28 PROCEDURE — 80061 LIPID PANEL: CPT

## 2022-07-28 NOTE — PROGRESS NOTES
Dear \[facility]\"",    Your blood work is stable on the cholesterol medication.     Sincerely,  Dr. Shin Scanlon

## 2022-08-10 ENCOUNTER — PATIENT MESSAGE (OUTPATIENT)
Dept: FAMILY MEDICINE CLINIC | Facility: CLINIC | Age: 67
End: 2022-08-10

## 2022-08-10 ENCOUNTER — TELEPHONE (OUTPATIENT)
Dept: FAMILY MEDICINE CLINIC | Facility: CLINIC | Age: 67
End: 2022-08-10

## 2022-08-10 DIAGNOSIS — R19.7 DIARRHEA, UNSPECIFIED TYPE: Primary | ICD-10-CM

## 2022-08-10 NOTE — TELEPHONE ENCOUNTER
Per pt,  bouts of diarrhea yesterday was watery  Today none, resolved  No blood on the stool noted  Pt worries if its covid? Stomach flu? Still waiting for GI input  If something concerning? I advised her to monitor sx, if worsening to go to ER to be evaluated--increase pain, distention, N/V, bloody stool    Call for an update tomorrow otherwise, Dr. Miya Dsouza will be covering for Dr. Martina Siddiqi for   I advised that in the absence of flu like sx, its hard to conclude covid but can retest, and isolate if it resulted positive.    Pt verbalized understanding    Will check for update in am

## 2022-08-10 NOTE — TELEPHONE ENCOUNTER
Krzysztof Mcintyre has been having some stomach issues lately and she tried calling her gastro Dr but they have not returned her call so she would like to get Dr Ezio Perdue advice, please call Krzysztof Mcintyre at [phone]

## 2022-08-10 NOTE — TELEPHONE ENCOUNTER
3 days   sporatic, middle of stomach above belly button, sharp pain but brief, happens, burping a lot, not bloated, diarrhea X3 yesterday  hx colitis 30 yrs ago, GERD controlled  Associated Sx-N/V , bland food, none   No urinary sx   Last BM yesterday    Covid test negative at home   No other flu like sx   No sick contact  She don't think its food related    Offer Dr. Yolanda Garrido but declined   She wants your input  \"Not horrible\" \"better today\"     Monitor for now?    She is also waiting from GI     Pls advise   Thank you

## 2022-08-11 ENCOUNTER — OFFICE VISIT (OUTPATIENT)
Dept: FAMILY MEDICINE CLINIC | Facility: CLINIC | Age: 67
End: 2022-08-11
Payer: MEDICARE

## 2022-08-11 VITALS
OXYGEN SATURATION: 97 % | BODY MASS INDEX: 24.48 KG/M2 | DIASTOLIC BLOOD PRESSURE: 70 MMHG | RESPIRATION RATE: 16 BRPM | WEIGHT: 133 LBS | HEART RATE: 78 BPM | TEMPERATURE: 98 F | SYSTOLIC BLOOD PRESSURE: 122 MMHG | HEIGHT: 62 IN

## 2022-08-11 DIAGNOSIS — R19.7 DIARRHEA, UNSPECIFIED TYPE: Primary | ICD-10-CM

## 2022-08-11 PROCEDURE — 3078F DIAST BP <80 MM HG: CPT | Performed by: NURSE PRACTITIONER

## 2022-08-11 PROCEDURE — 99213 OFFICE O/P EST LOW 20 MIN: CPT | Performed by: NURSE PRACTITIONER

## 2022-08-11 PROCEDURE — 3074F SYST BP LT 130 MM HG: CPT | Performed by: NURSE PRACTITIONER

## 2022-08-11 PROCEDURE — 3008F BODY MASS INDEX DOCD: CPT | Performed by: NURSE PRACTITIONER

## 2022-08-11 NOTE — TELEPHONE ENCOUNTER
Did patient happen to get a COVID PCR test while at the walk-in clinic? I know of a handful of patients who tested negative on at home COVID tests but then positive on COVID PCR. If not done I can order a COVID test, otherwise agree with supportive care recommendations for likely viral gastro.      Tabby Hodges MD, 08/11/22, 2:22 PM

## 2022-08-11 NOTE — TELEPHONE ENCOUNTER
It seems as if patient performed an at home Covid test, PCR was not ordered at today's OV at [facility] (per [facility]). I placed the order for Covid PCR (per Dr. Pk Ya note)    I called the patient to make her aware of Covid PCR ordered, she stated that she spoke extensively with the [facility] provider and the [facility] provider did not think she has Covid. I made her aware the PCR is more focused on genetic detection vs antigen raid test -- she declines testing with COVID PCR at this time since [facility] provider did not think it was needed and is aware order has been placed by our office (me). She was made aware if any new s/s arise she should call us. She will continue with supportive care. Patient verbalized understanding. No further questions or concerns at this time. Dr. Moreno Pouch to advise, if any.

## 2022-08-11 NOTE — TELEPHONE ENCOUNTER
Condition Update    Patient did go to [facility] this morning, Dx with Virus, nothing prescribed. She states that she is improving. She will follow the recommendations that were provided by [facility] provider. She is aware that Dr. Myles Antonio is our on-call Provider, she may call us with any questions and concerns. She is aware Dr. Maryann Kaur will be returning 8/19. Patient verbalized understanding. No further questions or concerns at this time.     Dr. Myles Antonio (on-call) to review and advise, if any  Dr. Maryann Kaur to review upon return

## 2022-08-12 DIAGNOSIS — R19.7 DIARRHEA, UNSPECIFIED TYPE: Primary | ICD-10-CM

## 2022-08-12 DIAGNOSIS — K51.90 ULCERATIVE COLITIS WITHOUT COMPLICATIONS, UNSPECIFIED LOCATION (HCC): ICD-10-CM

## 2022-08-15 DIAGNOSIS — Z79.899 MEDICATION MANAGEMENT: Primary | ICD-10-CM

## 2022-08-15 DIAGNOSIS — I25.10 CORONARY ARTERY DISEASE INVOLVING NATIVE CORONARY ARTERY OF NATIVE HEART WITHOUT ANGINA PECTORIS: ICD-10-CM

## 2022-08-16 RX ORDER — ATORVASTATIN CALCIUM 10 MG/1
10 TABLET, FILM COATED ORAL NIGHTLY
Qty: 30 TABLET | Refills: 2 | Status: SHIPPED | OUTPATIENT
Start: 2022-08-16

## 2022-09-06 ENCOUNTER — TELEPHONE (OUTPATIENT)
Dept: FAMILY MEDICINE CLINIC | Facility: CLINIC | Age: 67
End: 2022-09-06

## 2022-10-16 NOTE — LETTER
ASTHMA ACTION PLAN for Kelly Still     : 1955     Date: 2022  Provider:  Fran Coleman DO  Phone for doctor or clinic: Phillip Karmen Chavez 8 [address] [facility] Radha Sahni [phone]    ACT Score: 25      You can use the colors of a traffic light to help learn about your asthma medicines. 1. Green - Go! % of Personal Best Peak Flow Use controller medicine. Breathing is good  No cough or wheeze  Can work and play Medicine How much to take When to take it    You state you do not take daily medication for asthma       2. Yellow - Caution. 50-79% Personal Best Peak  Flow. Use reliever medicine to keep an asthma attack from getting bad. Cough  Wheezing  Tight Chest  Wake up at night Medicine How much to take When to take it    Albuterol inhaler,  2 puffs every four hours as needed. Additional instructions         3. Red - Stop! Danger!  <50% Personal Best Peak  Flow. Take these medications until  Get help from a doctor   Medicine not helping  Breathing is hard and fast  Nose opens wide  Can't walk  Ribs show  Can't talk well Medicine How much to take When to take it    Albuterol inhaler,  2 puffs every five mins as needed until symptoms resolve. If they do not please call 911 or head to nearest ER. Additional Instructions If your symptoms do not improve and you cannot contact your doctor, [facility] or call 911 immediately! [x] Asthma Action Plan reviewed with patient (and caregiver if necessary) and a copy of the plan was given to the patient/caregiver. [] Asthma Action Plan reviewed with patient (and caregiver if necessary) on the phone and mailed copy to patient or submitted via [address]      Signatures:  Provider  Fran Coleman DO   Patient Caretaker Goal Outcome Evaluation:               Pt is A&O. Denies pain. LS diminished. Occasional cough noted.IV  placed to LUE and infusing. Pt is cont. Voiding. Pt is A-1 with walker and gate belt.continues IV  antibiotic therapy.

## 2022-11-11 DIAGNOSIS — Z79.899 MEDICATION MANAGEMENT: ICD-10-CM

## 2022-11-11 DIAGNOSIS — I25.10 CORONARY ARTERY DISEASE INVOLVING NATIVE CORONARY ARTERY OF NATIVE HEART WITHOUT ANGINA PECTORIS: ICD-10-CM

## 2022-11-11 RX ORDER — ATORVASTATIN CALCIUM 10 MG/1
10 TABLET, FILM COATED ORAL NIGHTLY
Qty: 30 TABLET | Refills: 0 | Status: SHIPPED | OUTPATIENT
Start: 2022-11-11

## 2022-11-20 ENCOUNTER — PATIENT MESSAGE (OUTPATIENT)
Dept: FAMILY MEDICINE CLINIC | Facility: CLINIC | Age: 67
End: 2022-11-20

## 2022-11-20 DIAGNOSIS — L71.9 ROSACEA, ACNE: ICD-10-CM

## 2022-11-20 DIAGNOSIS — I10 ESSENTIAL HYPERTENSION: Primary | ICD-10-CM

## 2022-11-21 RX ORDER — METRONIDAZOLE 10 MG/G
1 GEL TOPICAL DAILY
Qty: 60 G | Refills: 1 | Status: SHIPPED | OUTPATIENT
Start: 2022-11-21 | End: 2023-03-21

## 2022-11-21 RX ORDER — SPIRONOLACTONE 25 MG/1
25 TABLET ORAL DAILY
Qty: 90 TABLET | Refills: 1 | Status: SHIPPED | OUTPATIENT
Start: 2022-11-21

## 2022-11-21 NOTE — TELEPHONE ENCOUNTER
Spoke to patient. It's not bad, she will watch over the next 2 weeks. She has been more active lately and maybe that's it. Rosacea, would like topical refilled. She said it was the one she had last year and it worked. Make sure I have right one pended. Needs spironolactone refilled.

## 2022-12-05 ENCOUNTER — HOSPITAL ENCOUNTER (OUTPATIENT)
Dept: GENERAL RADIOLOGY | Age: 67
Discharge: HOME | End: 2022-12-05
Attending: FAMILY MEDICINE
Payer: MEDICARE

## 2022-12-05 ENCOUNTER — OFFICE VISIT (OUTPATIENT)
Dept: FAMILY MEDICINE CLINIC | Facility: CLINIC | Age: 67
End: 2022-12-05
Payer: MEDICARE

## 2022-12-05 VITALS
SYSTOLIC BLOOD PRESSURE: 114 MMHG | HEART RATE: 73 BPM | OXYGEN SATURATION: 100 % | DIASTOLIC BLOOD PRESSURE: 70 MMHG | HEIGHT: 62 IN | WEIGHT: 131 LBS | BODY MASS INDEX: 24.11 KG/M2 | RESPIRATION RATE: 16 BRPM

## 2022-12-05 DIAGNOSIS — K21.9 GASTROESOPHAGEAL REFLUX DISEASE, UNSPECIFIED WHETHER ESOPHAGITIS PRESENT: ICD-10-CM

## 2022-12-05 DIAGNOSIS — M25.511 CHRONIC PAIN OF BOTH SHOULDERS: ICD-10-CM

## 2022-12-05 DIAGNOSIS — D50.8 OTHER IRON DEFICIENCY ANEMIA: ICD-10-CM

## 2022-12-05 DIAGNOSIS — Z86.19 HISTORY OF HERPES GENITALIS: ICD-10-CM

## 2022-12-05 DIAGNOSIS — M85.80 OSTEOPENIA, UNSPECIFIED LOCATION: ICD-10-CM

## 2022-12-05 DIAGNOSIS — Z71.85 VACCINE COUNSELING: ICD-10-CM

## 2022-12-05 DIAGNOSIS — E55.9 VITAMIN D DEFICIENCY: ICD-10-CM

## 2022-12-05 DIAGNOSIS — Z12.31 ENCOUNTER FOR SCREENING MAMMOGRAM FOR MALIGNANT NEOPLASM OF BREAST: ICD-10-CM

## 2022-12-05 DIAGNOSIS — Z91.09 ENVIRONMENTAL ALLERGIES: ICD-10-CM

## 2022-12-05 DIAGNOSIS — I10 ESSENTIAL HYPERTENSION: Primary | ICD-10-CM

## 2022-12-05 DIAGNOSIS — E78.5 DYSLIPIDEMIA: ICD-10-CM

## 2022-12-05 DIAGNOSIS — Z87.19 HISTORY OF COLITIS: ICD-10-CM

## 2022-12-05 DIAGNOSIS — Z78.0 MENOPAUSE: ICD-10-CM

## 2022-12-05 DIAGNOSIS — F41.9 ANXIETY: ICD-10-CM

## 2022-12-05 DIAGNOSIS — M25.512 CHRONIC PAIN OF BOTH SHOULDERS: ICD-10-CM

## 2022-12-05 DIAGNOSIS — I25.10 CORONARY ARTERY DISEASE INVOLVING NATIVE CORONARY ARTERY OF NATIVE HEART WITHOUT ANGINA PECTORIS: ICD-10-CM

## 2022-12-05 DIAGNOSIS — G89.29 CHRONIC PAIN OF BOTH SHOULDERS: ICD-10-CM

## 2022-12-05 DIAGNOSIS — L71.9 ROSACEA, ACNE: ICD-10-CM

## 2022-12-05 DIAGNOSIS — Z79.899 MEDICATION MANAGEMENT: ICD-10-CM

## 2022-12-05 DIAGNOSIS — G47.00 INSOMNIA, UNSPECIFIED TYPE: ICD-10-CM

## 2022-12-05 DIAGNOSIS — J45.20 MILD INTERMITTENT ASTHMA WITHOUT COMPLICATION: ICD-10-CM

## 2022-12-05 DIAGNOSIS — Z86.19 HISTORY OF COLD SORES: ICD-10-CM

## 2022-12-05 DIAGNOSIS — L92.0 GRANULOMA ANNULARE: ICD-10-CM

## 2022-12-05 DIAGNOSIS — K44.9 HIATAL HERNIA: ICD-10-CM

## 2022-12-05 LAB
ALBUMIN SERPL-MCNC: 4.1 G/DL (ref 3.4–5)
ALBUMIN/GLOB SERPL: 1.1 {RATIO} (ref 1–2)
ALP LIVER SERPL-CCNC: 63 U/L
ALT SERPL-CCNC: 29 U/L
ANION GAP SERPL CALC-SCNC: 3 MMOL/L (ref 0–18)
AST SERPL-CCNC: 19 U/L (ref 15–37)
BILIRUB SERPL-MCNC: 1.3 MG/DL (ref 0.1–2)
BUN BLD-MCNC: 12 MG/DL (ref 7–18)
CALCIUM BLD-MCNC: 9.4 MG/DL (ref 8.5–10.1)
CHLORIDE SERPL-SCNC: 106 MMOL/L (ref 98–112)
CHOLEST SERPL-MCNC: 161 MG/DL (ref ?–200)
CO2 SERPL-SCNC: 30 MMOL/L (ref 21–32)
CREAT BLD-MCNC: 0.86 MG/DL
FASTING PATIENT LIPID ANSWER: YES
FASTING STATUS PATIENT QL REPORTED: YES
GFR SERPLBLD BASED ON 1.73 SQ M-ARVRAT: 75 ML/MIN/1.73M2 (ref 60–?)
GLOBULIN PLAS-MCNC: 3.7 G/DL (ref 2.8–4.4)
GLUCOSE BLD-MCNC: 107 MG/DL (ref 70–99)
HDLC SERPL-MCNC: 76 MG/DL (ref 40–59)
LDLC SERPL CALC-MCNC: 75 MG/DL (ref ?–100)
NONHDLC SERPL-MCNC: 85 MG/DL (ref ?–130)
OSMOLALITY SERPL CALC.SUM OF ELEC: 288 MOSM/KG (ref 275–295)
POTASSIUM SERPL-SCNC: 4.4 MMOL/L (ref 3.5–5.1)
PROT SERPL-MCNC: 7.8 G/DL (ref 6.4–8.2)
SODIUM SERPL-SCNC: 139 MMOL/L (ref 136–145)
TRIGL SERPL-MCNC: 49 MG/DL (ref 30–149)
VLDLC SERPL CALC-MCNC: 8 MG/DL (ref 0–30)

## 2022-12-05 PROCEDURE — 73030 X-RAY EXAM OF SHOULDER: CPT | Performed by: FAMILY MEDICINE

## 2022-12-05 PROCEDURE — 80053 COMPREHEN METABOLIC PANEL: CPT | Performed by: FAMILY MEDICINE

## 2022-12-05 PROCEDURE — 99214 OFFICE O/P EST MOD 30 MIN: CPT | Performed by: FAMILY MEDICINE

## 2022-12-05 PROCEDURE — 3074F SYST BP LT 130 MM HG: CPT | Performed by: FAMILY MEDICINE

## 2022-12-05 PROCEDURE — 3008F BODY MASS INDEX DOCD: CPT | Performed by: FAMILY MEDICINE

## 2022-12-05 PROCEDURE — 80061 LIPID PANEL: CPT | Performed by: FAMILY MEDICINE

## 2022-12-05 PROCEDURE — 3078F DIAST BP <80 MM HG: CPT | Performed by: FAMILY MEDICINE

## 2022-12-05 RX ORDER — CIMETIDINE 300 MG/1
TABLET, FILM COATED ORAL
Qty: 360 TABLET | Refills: 1 | Status: SHIPPED | OUTPATIENT
Start: 2022-12-05

## 2022-12-05 RX ORDER — ATORVASTATIN CALCIUM 10 MG/1
10 TABLET, FILM COATED ORAL NIGHTLY
Qty: 90 TABLET | Refills: 1 | Status: SHIPPED | OUTPATIENT
Start: 2022-12-05

## 2022-12-05 NOTE — PROGRESS NOTES
Dear Braden Resides,    Your right shoulder shows mild arthritis. Proceed with PT as we discussed.     Sincerely,  Dr. Maryann Kaur

## 2022-12-06 DIAGNOSIS — R73.9 HYPERGLYCEMIA: Primary | ICD-10-CM

## 2022-12-07 ENCOUNTER — TELEPHONE (OUTPATIENT)
Dept: FAMILY MEDICINE CLINIC | Facility: CLINIC | Age: 67
End: 2022-12-07

## 2022-12-07 ENCOUNTER — PATIENT MESSAGE (OUTPATIENT)
Dept: FAMILY MEDICINE CLINIC | Facility: CLINIC | Age: 67
End: 2022-12-07

## 2022-12-07 NOTE — TELEPHONE ENCOUNTER
Attempted call  Long cue then dead air     LOV 12/5/22    Gastroesophageal reflux disease, unspecified whether esophagitis present  -- stable on cimetidine-- cough is improving; notes morning acid reflex    Pt taking Rx since 11/11/19     Dx Gastroesophageal reflux disease, unspecified whether esophagitis present     Seen by Dr. Susie Suarez 7/6/22

## 2022-12-07 NOTE — TELEPHONE ENCOUNTER
From: Leno Arvizu  To: Mechelle Kaminski DO  Sent: 12/7/2022 4:40 PM CST  Subject: Blood work order    [employer]. I did not receive the order to set up the additional blood work Dr Kavon Sales wanted me to do. How soon do I need to do this. How stressed should I be about this.

## 2022-12-07 NOTE — TELEPHONE ENCOUNTER
Omer Caceres from [facility] is calling to get clinical notes so Padmini Schneider can get her cimetidine 300 MG Oral Tab cheaper please call Omer Caceres at [phone] Case #[id number]

## 2022-12-08 ENCOUNTER — PATIENT MESSAGE (OUTPATIENT)
Dept: FAMILY MEDICINE CLINIC | Facility: CLINIC | Age: 67
End: 2022-12-08

## 2022-12-08 ENCOUNTER — TELEPHONE (OUTPATIENT)
Dept: PHYSICAL THERAPY | Facility: HOSPITAL | Age: 67
End: 2022-12-08

## 2022-12-09 NOTE — TELEPHONE ENCOUNTER
From: Norbert Mejia  To: Wilfred Curry DO  Sent: 12/8/2022 4:33 PM CST  Subject: Cimetidine    I contacted my insurance co because the cimetidine was a tier 4 and my cost was $300. They just left a message saying it was approved so I believe that means it will be at a lower tier and cost me less. I believe you already placed the refill order so can you contact [facility] to revise the prescription.  Thank you

## 2022-12-12 ENCOUNTER — OFFICE VISIT (OUTPATIENT)
Dept: PHYSICAL THERAPY | Facility: HOSPITAL | Age: 67
End: 2022-12-12
Attending: FAMILY MEDICINE
Payer: MEDICARE

## 2022-12-12 DIAGNOSIS — M25.511 CHRONIC PAIN OF BOTH SHOULDERS: ICD-10-CM

## 2022-12-12 DIAGNOSIS — M25.512 CHRONIC PAIN OF BOTH SHOULDERS: ICD-10-CM

## 2022-12-12 DIAGNOSIS — G89.29 CHRONIC PAIN OF BOTH SHOULDERS: ICD-10-CM

## 2022-12-12 PROCEDURE — 97110 THERAPEUTIC EXERCISES: CPT

## 2022-12-12 PROCEDURE — 97161 PT EVAL LOW COMPLEX 20 MIN: CPT

## 2022-12-14 ENCOUNTER — OFFICE VISIT (OUTPATIENT)
Dept: PHYSICAL THERAPY | Facility: HOSPITAL | Age: 67
End: 2022-12-14
Attending: FAMILY MEDICINE
Payer: MEDICARE

## 2022-12-14 PROCEDURE — 97140 MANUAL THERAPY 1/> REGIONS: CPT

## 2022-12-14 PROCEDURE — 97110 THERAPEUTIC EXERCISES: CPT

## 2022-12-14 NOTE — PROGRESS NOTES
Diagnosis:     B chronic shoulder pain    Referring Provider: Leticia Baumann  Date of Evaluation:   12/12/22    Precautions:  None Next MD visit:   none scheduled  Date of Surgery: n/a   Insurance Primary/Secondary: Reva Lux / N/A       # Auth Visits: 10   Total Timed Treatment: 45 min       Total Treatment time: 50 min       Charges: TE 2(30) MT (15)       Treatment Number: 2/10    Subjective: Pt. States shoulders are feeling pretty good today. Doing HEP. Pain: 1/10 stiffness    Objective/Goals: Refer to flow sheet. Updated HEP with scapular strengthening. MT to decrease muscle guarding, tightness and improve GH and subscapular mobility. TE  scifit x 6 min  Wall wipes c SB x 10  Wall push ups x 10  Door stretch 10 sec x 10  Red t-band B row x 10  Red t-band B ext x 10  R. S B shoulders at 90 deg in supine  PROM B shoulders    MT  STM B shoulders  Gr. II and III GH mobs x 10 bouts  Gr II and III subscapular mobs x 10 bouts    Ice x 5 min    Goals  To be met in 10 visits     1. Improve R/L shoulder ROM flexion to 180 degrees to allow patient to reach overhead shelf with more ease. 2. Improve R/L shoulder strength by 1/2 grade to allow patient to lift pan when cooking with more ease. 3. Improve parascapular strength by 1/2 grade to promote improved posture and body mechanics with lifting and carrying up to 10 pounds     4. Decrease symptoms by 50% to allow patient to lay on R/L side without difficulty. 5. Patient to demonstrate improved posture and body mechanics with lifting and carrying up to 10 pounds to bring in groceries and help with grand kids. 6. Patient to be independent with HEP, improved posture, joint protection principles, and proper body mechanics. HEP: are in bold; shoulder rolls, scapular retraction  Education: joint protection principles,proper posture, body mechanics, sleeping positions    Assessment: Tolerated session well. Good response to new exercises. No new pain with session. Plan: Assess response to last session. Progress as tolerated. Add supine on foam beam next session.

## 2022-12-20 ENCOUNTER — OFFICE VISIT (OUTPATIENT)
Dept: PHYSICAL THERAPY | Facility: HOSPITAL | Age: 67
End: 2022-12-20
Attending: FAMILY MEDICINE
Payer: MEDICARE

## 2022-12-20 PROCEDURE — 97110 THERAPEUTIC EXERCISES: CPT

## 2022-12-20 PROCEDURE — 97140 MANUAL THERAPY 1/> REGIONS: CPT

## 2022-12-20 NOTE — PROGRESS NOTES
Diagnosis:     B chronic shoulder pain    Referring Provider: No ref. provider found  Date of Evaluation:   12/12/22    Precautions:  None Next MD visit:   none scheduled  Date of Surgery: n/a   Insurance Primary/Secondary: Demetria Lau / N/A       # Auth Visits: 10   Total Timed Treatment: 45 min       Total Treatment time: 50 min       Charges: TE 2(30) MT (15)       Treatment Number: 3/10    Subjective: Pt. States shoulders are feeling pretty good today. No new complaints. Pain: 1/10 stiffness    Objective/Goals: Refer to flow sheet. Updated HEP with scapular strengthening. MT to decrease muscle guarding, tightness and improve GH and subscapular mobility. TE  scifit x 6 min  Wall wipes c SB x 10  Wall push ups x 10  Door stretch 10 sec x 10  Free motion   10#    -B row x 15   - B ext x 15   - W x 15  R. S B shoulders at 90 deg in supine  PROM B shoulders    MT  STM B shoulders  Gr. II and III GH mobs x 10 bouts  Gr II and III subscapular mobs x 10 bouts    Ice x 5 min    Goals  To be met in 10 visits     1. Improve R/L shoulder ROM flexion to 180 degrees to allow patient to reach overhead shelf with more ease. 2. Improve R/L shoulder strength by 1/2 grade to allow patient to lift pan when cooking with more ease. 3. Improve parascapular strength by 1/2 grade to promote improved posture and body mechanics with lifting and carrying up to 10 pounds     4. Decrease symptoms by 50% to allow patient to lay on R/L side without difficulty. 5. Patient to demonstrate improved posture and body mechanics with lifting and carrying up to 10 pounds to bring in groceries and help with grand kids. 6. Patient to be independent with HEP, improved posture, joint protection principles, and proper body mechanics. HEP: are in bold; shoulder rolls, scapular retraction  Education: joint protection principles,proper posture, body mechanics, sleeping positions    Assessment: Tolerated session well.  Good response to new exercises. No new pain with session. Plan: Assess response to last session. Progress as tolerated. Add supine on foam beam next session.

## 2022-12-28 ENCOUNTER — OFFICE VISIT (OUTPATIENT)
Dept: PHYSICAL THERAPY | Facility: HOSPITAL | Age: 67
End: 2022-12-28
Attending: FAMILY MEDICINE
Payer: MEDICARE

## 2022-12-28 PROCEDURE — 97110 THERAPEUTIC EXERCISES: CPT

## 2022-12-28 PROCEDURE — 97140 MANUAL THERAPY 1/> REGIONS: CPT

## 2023-01-03 ENCOUNTER — OFFICE VISIT (OUTPATIENT)
Dept: PHYSICAL THERAPY | Facility: HOSPITAL | Age: 68
End: 2023-01-03
Attending: FAMILY MEDICINE
Payer: MEDICARE

## 2023-01-03 PROCEDURE — 97140 MANUAL THERAPY 1/> REGIONS: CPT

## 2023-01-03 PROCEDURE — 97110 THERAPEUTIC EXERCISES: CPT

## 2023-01-03 NOTE — PROGRESS NOTES
Diagnosis:     B chronic shoulder pain    Referring Provider: Hayden Theodore  Date of Evaluation:   12/12/22    Precautions:  None Next MD visit:   none scheduled  Date of Surgery: n/a   Insurance Primary/Secondary: Aamir Wright / N/A       # Auth Visits: 10   Total Timed Treatment: 45 min       Total Treatment time: 50 min       Charges: TE 2(30) MT (15)       Treatment Number: 4/10    Subjective: Pt. States shoulders are feeling pretty good today. No new complaints. Pain: 1/10 stiffness    Objective/Goals: Refer to flow sheet. MT to decrease muscle guarding, tightness and improve GH and subscapular mobility. TE  scifit x 6 min  Wall wipes c SB x 10  Wall push ups x 10  Door stretch 10 sec x 10  Free motion   10#    -B row x 15   - B ext x 15   - W x 15  Supine on foam beam 2#   - bench, flys, OH flex, snow angles x 10 each  R.S B shoulders at 90 deg in supine  PROM B shoulders    MT  STM B shoulders  Gr. II and III GH mobs x 10 bouts  Gr II and III subscapular mobs x 10 bouts    Ice x 5 min    Goals  To be met in 10 visits     1. Improve R/L shoulder ROM flexion to 180 degrees to allow patient to reach overhead shelf with more ease. 2. Improve R/L shoulder strength by 1/2 grade to allow patient to lift pan when cooking with more ease. 3. Improve parascapular strength by 1/2 grade to promote improved posture and body mechanics with lifting and carrying up to 10 pounds     4. Decrease symptoms by 50% to allow patient to lay on R/L side without difficulty. 5. Patient to demonstrate improved posture and body mechanics with lifting and carrying up to 10 pounds to bring in groceries and help with grand kids. 6. Patient to be independent with HEP, improved posture, joint protection principles, and proper body mechanics. HEP: are in bold; shoulder rolls, scapular retraction  Education: joint protection principles,proper posture, body mechanics, sleeping positions    Assessment: Tolerated session well. Good response to new exercises. No new pain with session. Plan: Assess response to last session. Progress as tolerated. Update HEP.

## 2023-01-03 NOTE — PROGRESS NOTES
Diagnosis:     B chronic shoulder pain    Referring Provider: Joe Garrido  Date of Evaluation:   12/12/22    Precautions:  None Next MD visit:   none scheduled  Date of Surgery: n/a   Insurance Primary/Secondary: Eduardo Calero / N/A       # Auth Visits: 10   Total Timed Treatment: 45 min       Total Treatment time: 50 min       Charges: TE 2(30) MT (15)       Treatment Number: 5/10    Subjective: Pt. States shoulders are feeling pretty good today. No new complaints. Upper trap pain has resolved with her stretching, heat and self massage. Pain: 0/10     Objective/Goals: Refer to flow sheet. MT to decrease muscle guarding, tightness and improve GH and subscapular mobility. Pt. Able to demonstrate full AROM in all planes this date B shoulders. TE  scifit x 6 min  Wall wipes c SB x 10  Wall push ups x 10  TRx - flex and abd stretch x 5 each  Blue t-band standing against wall   - flex to 90 x 10   - scaption to 90 x 10   - chest pull x 10   -PNF D2 x 10 each  R.S B shoulders at 90 deg in supine  PROM B shoulders    MT  STM B shoulders  Gr. II and III GH mobs x 10 bouts  Gr II and III subscapular mobs x 10 bouts    Ice x 5 min    Goals  To be met in 10 visits     1. Improve R/L shoulder ROM flexion to 180 degrees to allow patient to reach overhead shelf with more ease. Met     2. Improve R/L shoulder strength by 1/2 grade to allow patient to lift pan when cooking with more ease. 3. Improve parascapular strength by 1/2 grade to promote improved posture and body mechanics with lifting and carrying up to 10 pounds     4. Decrease symptoms by 50% to allow patient to lay on R/L side without difficulty. 5. Patient to demonstrate improved posture and body mechanics with lifting and carrying up to 10 pounds to bring in groceries and help with grand kids. 6. Patient to be independent with HEP, improved posture, joint protection principles, and proper body mechanics.     HEP: are in bold; shoulder rolls, scapular retraction  Education: joint protection principles,proper posture, body mechanics, sleeping positions    Assessment: Tolerated session well. Good response to new exercises. No new pain with session. Plan: Assess response to last session. Progress as tolerated. Add CKC shoulder strengthening next session.

## 2023-01-09 ENCOUNTER — TELEPHONE (OUTPATIENT)
Dept: PHYSICAL THERAPY | Facility: HOSPITAL | Age: 68
End: 2023-01-09

## 2023-01-09 ENCOUNTER — APPOINTMENT (OUTPATIENT)
Dept: PHYSICAL THERAPY | Facility: HOSPITAL | Age: 68
End: 2023-01-09
Attending: FAMILY MEDICINE
Payer: MEDICARE

## 2023-01-11 ENCOUNTER — OFFICE VISIT (OUTPATIENT)
Dept: PHYSICAL THERAPY | Facility: HOSPITAL | Age: 68
End: 2023-01-11
Attending: FAMILY MEDICINE
Payer: MEDICARE

## 2023-01-11 PROCEDURE — 97140 MANUAL THERAPY 1/> REGIONS: CPT

## 2023-01-11 PROCEDURE — 97110 THERAPEUTIC EXERCISES: CPT

## 2023-01-12 NOTE — PROGRESS NOTES
Diagnosis:     B chronic shoulder pain    Referring Provider: Mallory Phoenix  Date of Evaluation:   12/12/22    Precautions:  None Next MD visit:   none scheduled  Date of Surgery: n/a   Insurance Primary/Secondary: Tami Smith / N/A       # Auth Visits: 10   Total Timed Treatment: 45 min       Total Treatment time: 50 min       Charges: TE 2(30) MT (15)       Treatment Number: 6/10    Subjective: No new complaints. Shoulders have been feeling good. Pain: 0/10     Objective/Goals: Refer to flow sheet. MT to decrease muscle guarding, tightness and improve GH and subscapular mobility. TE  scifit x 6 min  Wall wipes c SB x 10  Wall push ups x 10  4 point 2#   - flex x 10   - horizontal abd x 10   - row x 10   - ext x 10  Standing 2#   - scaption to 90 x 10   -bench press x 10   -PNF D2 x 10  R. S B shoulders at 90 deg in supine  PROM B shoulders    MT  STM B shoulders  Gr. II and III GH mobs x 10 bouts  Gr II and III subscapular mobs x 10 bouts    Ice x 5 min    Goals  To be met in 10 visits     1. Improve R/L shoulder ROM flexion to 180 degrees to allow patient to reach overhead shelf with more ease. Met     2. Improve R/L shoulder strength by 1/2 grade to allow patient to lift pan when cooking with more ease. 3. Improve parascapular strength by 1/2 grade to promote improved posture and body mechanics with lifting and carrying up to 10 pounds     4. Decrease symptoms by 50% to allow patient to lay on R/L side without difficulty. 5. Patient to demonstrate improved posture and body mechanics with lifting and carrying up to 10 pounds to bring in groceries and help with grand kids. 6. Patient to be independent with HEP, improved posture, joint protection principles, and proper body mechanics. HEP: are in bold; shoulder rolls, scapular retraction  Education: joint protection principles,proper posture, body mechanics, sleeping positions    Assessment: Tolerated session well. Good response to new exercises.  No new pain with session. B UE strength improving. Able to perform CKC exercises without difficulty. Plan: Assess response to last session. Progress as tolerated.

## 2023-01-16 ENCOUNTER — OFFICE VISIT (OUTPATIENT)
Dept: PHYSICAL THERAPY | Facility: HOSPITAL | Age: 68
End: 2023-01-16
Attending: FAMILY MEDICINE
Payer: MEDICARE

## 2023-01-16 ENCOUNTER — LAB ENCOUNTER (OUTPATIENT)
Dept: LAB | Age: 68
End: 2023-01-16
Attending: FAMILY MEDICINE
Payer: MEDICARE

## 2023-01-16 DIAGNOSIS — R73.9 HYPERGLYCEMIA: ICD-10-CM

## 2023-01-16 LAB
EST. AVERAGE GLUCOSE BLD GHB EST-MCNC: 111 MG/DL (ref 68–126)
HBA1C MFR BLD: 5.5 % (ref ?–5.7)

## 2023-01-16 PROCEDURE — 97110 THERAPEUTIC EXERCISES: CPT

## 2023-01-16 PROCEDURE — 36415 COLL VENOUS BLD VENIPUNCTURE: CPT

## 2023-01-16 PROCEDURE — 97140 MANUAL THERAPY 1/> REGIONS: CPT

## 2023-01-16 PROCEDURE — 83036 HEMOGLOBIN GLYCOSYLATED A1C: CPT

## 2023-01-16 NOTE — PROGRESS NOTES
Diagnosis:     B chronic shoulder pain    Referring Provider: Sheryl Lawson  Date of Evaluation:   12/12/22    Precautions:  None Next MD visit:   none scheduled  Date of Surgery: n/a   Insurance Primary/Secondary: Maximino Ramos / N/A       # Auth Visits: 10   Total Timed Treatment: 45 min       Total Treatment time: 50 min       Charges: TE 2(30) MT (15)       Treatment Number: 7/10    Subjective: No new complaints. Shoulders have been feeling good. Had grandkids this weekend, shoulders did well, no soreness or pain. Will try one of her online exercise videos this week. Pain: 0/10     Objective/Goals: Refer to flow sheet. MT to decrease muscle guarding, tightness and improve GH and subscapular mobility. TE  scifit x 6 min  Wall wipes c SB x 10  Wall push ups x 10  Free motion 15#   - scapular retraction x 15   - shoulder extension x 15   - W x 15  Red MB   - rebounder chest pass x 10   - OH pass x 10   -1/2 arc on wall x 5 each   -OH taps flex x 20 each  R.S B shoulders at 90 deg in supine  PROM B shoulders    MT  STM B shoulders  Gr. II and III GH mobs x 10 bouts  Gr II and III subscapular mobs x 10 bouts    Ice x 5 min    Goals  To be met in 10 visits     1. Improve R/L shoulder ROM flexion to 180 degrees to allow patient to reach overhead shelf with more ease. Met     2. Improve R/L shoulder strength by 1/2 grade to allow patient to lift pan when cooking with more ease. 3. Improve parascapular strength by 1/2 grade to promote improved posture and body mechanics with lifting and carrying up to 10 pounds     4. Decrease symptoms by 50% to allow patient to lay on R/L side without difficulty. 5. Patient to demonstrate improved posture and body mechanics with lifting and carrying up to 10 pounds to bring in groceries and help with grand kids. 6. Patient to be independent with HEP, improved posture, joint protection principles, and proper body mechanics.     HEP: are in bold; shoulder rolls, scapular retraction  Education: joint protection principles,proper posture, body mechanics, sleeping positions    Assessment: Tolerated session well. Good response to new exercises. No new pain with session. B UE strength improving. B shoulder ROM improved to WNL. Plan: Assess response to last session. Progress as tolerated.

## 2023-01-16 NOTE — PROGRESS NOTES
Dear Yoseph Logan,    Your A1c, the test for prediabetes, is within normal limits.      Sincerely,  Dr. Saucedo Ang

## 2023-01-18 ENCOUNTER — OFFICE VISIT (OUTPATIENT)
Dept: PHYSICAL THERAPY | Facility: HOSPITAL | Age: 68
End: 2023-01-18
Attending: FAMILY MEDICINE
Payer: MEDICARE

## 2023-01-18 PROCEDURE — 97110 THERAPEUTIC EXERCISES: CPT

## 2023-01-18 PROCEDURE — 97140 MANUAL THERAPY 1/> REGIONS: CPT

## 2023-01-18 NOTE — PROGRESS NOTES
Diagnosis:     B chronic shoulder pain    Referring Provider: No ref. provider found  Date of Evaluation:   12/12/22    Precautions:  None Next MD visit:   none scheduled  Date of Surgery: n/a   Insurance Primary/Secondary: Allisonal Latin / N/A       # Auth Visits: 10   Total Timed Treatment: 45 min       Total Treatment time: 50 min       Charges: TE 2(30) MT (15)       Treatment Number: 8/10    Subjective: Pt. States shoulders are feeling good. Did one of her online workouts and did well. No increased pain. Did not use any weights. Has some tightness L UT, but usually improves with her stretching and shower. Pain: 0/10     Objective/Goals: Refer to flow sheet. MT to decrease muscle guarding, tightness and improve GH and subscapular mobility. Full ROM B shoulders, painfree. TE  scifit x 6 min  Wall wipes c SB x 10  Wall push ups x 10  Back against SB with 1# weights   - flex to 90 x 10   -scaption to 90 x 10   - abd to 90 x 10   - bench press x 10  1/2 arc on wall x 5 each  90 deg R. S c red MB cw/ccw x 10 each in flex and abd    R.S B shoulders at 90 deg in supine  PROM B shoulders    MT  STM B shoulders  Gr. II and III GH mobs x 10 bouts  Gr II and III subscapular mobs x 10 bouts    Ice x 5 min    Goals  To be met in 10 visits     1. Improve R/L shoulder ROM flexion to 180 degrees to allow patient to reach overhead shelf with more ease. Met     2. Improve R/L shoulder strength by 1/2 grade to allow patient to lift pan when cooking with more ease. 3. Improve parascapular strength by 1/2 grade to promote improved posture and body mechanics with lifting and carrying up to 10 pounds     4. Decrease symptoms by 50% to allow patient to lay on R/L side without difficulty. 5. Patient to demonstrate improved posture and body mechanics with lifting and carrying up to 10 pounds to bring in groceries and help with grand kids.      6. Patient to be independent with HEP, improved posture, joint protection principles, and proper body mechanics. HEP: are in bold; shoulder rolls, scapular retraction  Education: joint protection principles,proper posture, body mechanics, sleeping positions    Assessment: Tolerated session well. Good response to new exercises. No new pain with session. B UE strength improving. B shoulder ROM improved to WNL. Plan: Assess response to last session. Progress as tolerated.

## 2023-01-23 ENCOUNTER — OFFICE VISIT (OUTPATIENT)
Dept: PHYSICAL THERAPY | Facility: HOSPITAL | Age: 68
End: 2023-01-23
Attending: FAMILY MEDICINE
Payer: MEDICARE

## 2023-01-23 PROCEDURE — 97110 THERAPEUTIC EXERCISES: CPT

## 2023-01-23 PROCEDURE — 97140 MANUAL THERAPY 1/> REGIONS: CPT

## 2023-01-23 NOTE — PROGRESS NOTES
Diagnosis:     B chronic shoulder pain    Referring Provider: No ref. provider found  Date of Evaluation:   12/12/22    Precautions:  None Next MD visit:   none scheduled  Date of Surgery: n/a   Insurance Primary/Secondary: Solo Mohr / N/A       # Auth Visits: 10   Total Timed Treatment: 45 min       Total Treatment time: 50 min       Charges: TE 2(30) MT (15)       Treatment Number: 9/10    Subjective: Pt. States shoulders are feeling good. Did another workout for her arms at home with no weights and did well. Had grandkids spend the night so neck is a little sore on her L side. States with the stretching, massage and heat it calms down pretty quickly. Pain: 0/10     Objective/Goals: Refer to flow sheet. MT to decrease muscle guarding, tightness and improve GH and subscapular mobility. Full ROM B shoulders, painfree. Prone scapular exercises on SB this date. Pt. Did well with new exercises. TE  scifit x 6 min  Wall wipes c SB x 10  Wall push ups x 10  Prone on SB   - Y, W, T, Lats x 10 each    R.S B shoulders at 90 deg in supine  PROM B shoulders    MT  STM B shoulders  Gr. II and III GH mobs x 10 bouts  Gr II and III subscapular mobs x 10 bouts    Ice x 5 min    Goals  To be met in 10 visits     1. Improve R/L shoulder ROM flexion to 180 degrees to allow patient to reach overhead shelf with more ease. Met     2. Improve R/L shoulder strength by 1/2 grade to allow patient to lift pan when cooking with more ease. 3. Improve parascapular strength by 1/2 grade to promote improved posture and body mechanics with lifting and carrying up to 10 pounds     4. Decrease symptoms by 50% to allow patient to lay on R/L side without difficulty. 5. Patient to demonstrate improved posture and body mechanics with lifting and carrying up to 10 pounds to bring in groceries and help with grand kids.      6. Patient to be independent with HEP, improved posture, joint protection principles, and proper body mechanics. HEP: are in bold; shoulder rolls, scapular retraction  Education: joint protection principles,proper posture, body mechanics, sleeping positions    Assessment: Progressing well toward goals. Good response to new exercises. No new pain with session. B UE strength improving. B shoulder ROM improved to WNL. Plan: Reassess next visit. Review/update HEP for possible discharge.

## 2023-01-26 ENCOUNTER — APPOINTMENT (OUTPATIENT)
Dept: PHYSICAL THERAPY | Facility: HOSPITAL | Age: 68
End: 2023-01-26
Attending: FAMILY MEDICINE
Payer: MEDICARE

## 2023-01-31 ENCOUNTER — OFFICE VISIT (OUTPATIENT)
Dept: PHYSICAL THERAPY | Facility: HOSPITAL | Age: 68
End: 2023-01-31
Attending: FAMILY MEDICINE
Payer: MEDICARE

## 2023-01-31 PROCEDURE — 97110 THERAPEUTIC EXERCISES: CPT

## 2023-01-31 PROCEDURE — 97140 MANUAL THERAPY 1/> REGIONS: CPT

## 2023-01-31 NOTE — PROGRESS NOTES
Dear Dr. Lukasz Resendiz DO,    This letter is to inform you of Bernadette Mar Sematiasluis's DISCHARGE SUMMARY in Physical Therapy at . [place] [address] and Sports Medicine. DX: B chronic shoulder pain         TREATMENT #    10   Of   10      DATE OF SERVICE: 1/31/2023    ASSESSMENT  Patient reports overall shoulders are pain free and she has returned to a modified workout routine. Pt. States she does have some L UT pain that comes and goes. Pt. States she is more aware of her posture. Pt. States she feels much more comfortable with what exercises she should continue and what exercises to avoid. Patient reports she is independent with current HEP and will continue on her own at this time. OTHER  Pain: 0/10 in shoulders; UT pain/tightness that comes and goes on L  Palpation: no tenderness B shoulders, mild tenderness L upper trap  Sensation: intact to light touch     AROM: pain at end range of all planes  Shoulder  Elbow WNL   Flexion: R 180; L 180  Abduction: R 180; L 180  ER: R 90; L 90  IR: R 90; L 90  Ext: R 60, L 60         Accessory motion: improved mobility throughout B shoulders GH and subscapular mobility improved      Flexibility: moderate tightness noted in L upper trap     Strength/MMT:   Shoulder Elbow Scapular   Flexion: R 5/5; L 5/5  Abduction: R 4+/5; L 4+/5  ER: R 4+/5; L 4+/5  IR: R 4+/5; L 4+/5 Flexion: R 5/5; L 5/5  Extension: R 4+/5; L 4+/5  Supination: R 4+/5; L 4+/5  Pronation: R 4+/5; L 4+/5  Rhomboids: R 4/5, L 4/5  Mid trap: R 4/5; L 4/5   Lats: R 4+/5, L 4+/5  Low trap: R 3+/5; L 3+/5      Special tests: (-) R  impingement testing     LONG AND SHORT TERM GOALS  To be met in 10 visits     1. Improve R/L shoulder ROM flexion to 180 degrees to allow patient to reach overhead shelf with more ease. Met     2. Improve R/L shoulder strength by 1/2 grade to allow patient to lift pan when cooking with more ease. Met     3.  Improve parascapular strength by 1/2 grade to promote improved posture and body mechanics with lifting and carrying up to 10 pounds. Met     4. Decrease symptoms by 50% to allow patient to lay on R/L side without difficulty. Met     5. Patient to demonstrate improved posture and body mechanics with lifting and carrying up to 10 pounds to bring in groceries and help with grand kids. Met     6. Patient to be independent with HEP, improved posture, joint protection principles, and proper body mechanics. Met        RECOMMENDATIONS AND PLAN OF TREATMENT  All goals met. Discharge patient. Patient to continue independently with current HEP. REHAB POTENTIAL  Good - All functional goals met. Patient/family/caregiver was advised of these findings, precautions, and treatment options and has agreed to actively participate in planning and for this course of care. Jamie 66 YOU FOR THE REFERRAL OF Jennifer Del Toro. IF YOU HAVE ANY QUESTIONS PLEASE CONTACT ME AT: [facility] [phone]          SINCERELY,           Aye Gottlieb, PT    PHYSICIAN'S CERTIFICATION REQUIRED: no  I certify the need for these services furnished under this plan of treatment and while under my care. X_________________________________________________ Date: ____________________    CERTIFICATION FROM: 2/44/5182 to 5/1/2023    Thank you for the referral of Apurva Perry. Please sign plan of care and return by fax.     Fax #Niko Salazar [phone]  [id number]

## 2023-02-27 ENCOUNTER — HOSPITAL ENCOUNTER (OUTPATIENT)
Dept: GENERAL RADIOLOGY | Age: 68
Discharge: HOME | End: 2023-02-27
Attending: FAMILY MEDICINE
Payer: MEDICARE

## 2023-02-27 ENCOUNTER — OFFICE VISIT (OUTPATIENT)
Dept: FAMILY MEDICINE CLINIC | Facility: CLINIC | Age: 68
End: 2023-02-27
Payer: MEDICARE

## 2023-02-27 ENCOUNTER — PATIENT MESSAGE (OUTPATIENT)
Dept: FAMILY MEDICINE CLINIC | Facility: CLINIC | Age: 68
End: 2023-02-27

## 2023-02-27 VITALS
HEIGHT: 62 IN | SYSTOLIC BLOOD PRESSURE: 118 MMHG | OXYGEN SATURATION: 98 % | DIASTOLIC BLOOD PRESSURE: 78 MMHG | TEMPERATURE: 98 F | RESPIRATION RATE: 20 BRPM | WEIGHT: 131 LBS | HEART RATE: 82 BPM | BODY MASS INDEX: 24.11 KG/M2

## 2023-02-27 DIAGNOSIS — J45.20 MILD INTERMITTENT ASTHMA WITHOUT COMPLICATION: ICD-10-CM

## 2023-02-27 DIAGNOSIS — H61.22 IMPACTED CERUMEN OF LEFT EAR: ICD-10-CM

## 2023-02-27 DIAGNOSIS — Z86.39 HISTORY OF HYPOTHYROIDISM: ICD-10-CM

## 2023-02-27 DIAGNOSIS — R73.9 HYPERGLYCEMIA: Primary | ICD-10-CM

## 2023-02-27 DIAGNOSIS — E55.9 VITAMIN D DEFICIENCY: ICD-10-CM

## 2023-02-27 DIAGNOSIS — I10 ESSENTIAL HYPERTENSION: ICD-10-CM

## 2023-02-27 DIAGNOSIS — M25.571 ACUTE RIGHT ANKLE PAIN: ICD-10-CM

## 2023-02-27 DIAGNOSIS — R79.89 HIGH SERUM HIGH DENSITY LIPOPROTEIN (HDL): ICD-10-CM

## 2023-02-27 DIAGNOSIS — M79.10 MYALGIA: ICD-10-CM

## 2023-02-27 DIAGNOSIS — K51.90 ULCERATIVE COLITIS WITHOUT COMPLICATIONS, UNSPECIFIED LOCATION (HCC): ICD-10-CM

## 2023-02-27 DIAGNOSIS — D50.8 IRON DEFICIENCY ANEMIA SECONDARY TO INADEQUATE DIETARY IRON INTAKE: ICD-10-CM

## 2023-02-27 PROCEDURE — 73610 X-RAY EXAM OF ANKLE: CPT | Performed by: FAMILY MEDICINE

## 2023-02-27 NOTE — PROGRESS NOTES
Dear Charlette Bound,    There is mild Roxobel's tendinoplasty but no arthritis.      Sincerely,  Dr. Leticia Baumann

## 2023-02-27 NOTE — TELEPHONE ENCOUNTER
From: Za Call  To: Lida Maddox DO  Sent: 2/27/2023 12:32 PM CST  Subject: Ankle x ray    What should I do about my ankle. Do you still suggest physical therapy. If so did you put in a referral. Thanks.

## 2023-03-27 ENCOUNTER — PATIENT MESSAGE (OUTPATIENT)
Dept: FAMILY MEDICINE CLINIC | Facility: CLINIC | Age: 68
End: 2023-03-27

## 2023-03-27 NOTE — TELEPHONE ENCOUNTER
From: Karla Cotton  To: Arash Gregory DO  Sent: 3/27/2023 3:50 PM CDT  Subject: Medication question    I just called in a refill for my cimetidine. They filled it but show a cost of $300. Last time I had it filled you called my insurance and they reduced the cost. Can you do this again.  Thank you

## 2023-03-27 NOTE — TELEPHONE ENCOUNTER
Dr. Sanjuana Phoenix,    Received a message from \[facility]\"" asking me to call insurance for a discounted price on her cimetidine 300mg 360pills since insurance was stating it was $300. ROBERT Belcher at [phone] and spoke to [place] who took down some information and transferred me to a pharmacist.  I was told that since this is a tier 4 medication and I could only find that famotidine was tried in the past in addition to cimetidine, that \[facility]\"" would need to try Nexium or Protonix. \[facility]\"" would have to fail 2 GERD meds before [facility] will allow cimetidine to be refilled. I did a Good Rx search and [place] would do 360 pills for a little less then $75.  Please advise next step. Thank you.

## 2023-03-28 NOTE — TELEPHONE ENCOUNTER
Dr. Karen Yao,    Notified Hugh Arvizu about Good Rx or trying another GERD medication since I was unsuccessful getting a reduced price of her cimetidine. Awaiting a response from Hugh Arvizu. Just an FYI. Jennifer's original script was sent to [facility] and they happened to have the lowest price!

## 2023-04-18 ENCOUNTER — MED REC SCAN ONLY (OUTPATIENT)
Dept: FAMILY MEDICINE CLINIC | Facility: CLINIC | Age: 68
End: 2023-04-18

## 2023-05-23 DIAGNOSIS — I10 ESSENTIAL HYPERTENSION: ICD-10-CM

## 2023-05-23 RX ORDER — SPIRONOLACTONE 25 MG/1
TABLET ORAL
Qty: 90 TABLET | Refills: 0 | Status: SHIPPED | OUTPATIENT
Start: 2023-05-23

## 2023-06-14 DIAGNOSIS — Z79.899 MEDICATION MANAGEMENT: ICD-10-CM

## 2023-06-14 DIAGNOSIS — I25.10 CORONARY ARTERY DISEASE INVOLVING NATIVE CORONARY ARTERY OF NATIVE HEART WITHOUT ANGINA PECTORIS: ICD-10-CM

## 2023-06-15 ENCOUNTER — HOSPITAL ENCOUNTER (OUTPATIENT)
Dept: MAMMOGRAPHY | Age: 68
Discharge: HOME | End: 2023-06-15
Attending: FAMILY MEDICINE
Payer: MEDICARE

## 2023-06-15 ENCOUNTER — LABORATORY ENCOUNTER (OUTPATIENT)
Dept: LAB | Age: 68
End: 2023-06-15
Attending: FAMILY MEDICINE
Payer: MEDICARE

## 2023-06-15 ENCOUNTER — HOSPITAL ENCOUNTER (OUTPATIENT)
Dept: BONE DENSITY | Age: 68
Discharge: HOME | End: 2023-06-15
Attending: FAMILY MEDICINE
Payer: MEDICARE

## 2023-06-15 DIAGNOSIS — Z12.31 ENCOUNTER FOR SCREENING MAMMOGRAM FOR MALIGNANT NEOPLASM OF BREAST: ICD-10-CM

## 2023-06-15 DIAGNOSIS — R79.89 HIGH SERUM HIGH DENSITY LIPOPROTEIN (HDL): ICD-10-CM

## 2023-06-15 DIAGNOSIS — Z78.0 MENOPAUSE: ICD-10-CM

## 2023-06-15 DIAGNOSIS — M79.10 MYALGIA: ICD-10-CM

## 2023-06-15 DIAGNOSIS — D50.8 IRON DEFICIENCY ANEMIA SECONDARY TO INADEQUATE DIETARY IRON INTAKE: ICD-10-CM

## 2023-06-15 DIAGNOSIS — E55.9 VITAMIN D DEFICIENCY: ICD-10-CM

## 2023-06-15 DIAGNOSIS — Z86.39 HISTORY OF HYPOTHYROIDISM: ICD-10-CM

## 2023-06-15 DIAGNOSIS — I10 ESSENTIAL HYPERTENSION: ICD-10-CM

## 2023-06-15 DIAGNOSIS — R73.9 HYPERGLYCEMIA: ICD-10-CM

## 2023-06-15 LAB
ALBUMIN SERPL-MCNC: 3.8 G/DL (ref 3.4–5)
ALBUMIN/GLOB SERPL: 1.1 {RATIO} (ref 1–2)
ALP LIVER SERPL-CCNC: 56 U/L
ALT SERPL-CCNC: 24 U/L
ANION GAP SERPL CALC-SCNC: 6 MMOL/L (ref 0–18)
AST SERPL-CCNC: 17 U/L (ref 15–37)
BASOPHILS # BLD AUTO: 0.07 X10(3) UL (ref 0–0.2)
BASOPHILS NFR BLD AUTO: 0.9 %
BILIRUB SERPL-MCNC: 1.4 MG/DL (ref 0.1–2)
BILIRUB UR QL STRIP.AUTO: NEGATIVE
BUN BLD-MCNC: 10 MG/DL (ref 7–18)
CALCIUM BLD-MCNC: 8.8 MG/DL (ref 8.5–10.1)
CHLORIDE SERPL-SCNC: 104 MMOL/L (ref 98–112)
CHOLEST SERPL-MCNC: 128 MG/DL (ref ?–200)
CK SERPL-CCNC: 58 U/L
CLARITY UR REFRACT.AUTO: CLEAR
CO2 SERPL-SCNC: 25 MMOL/L (ref 21–32)
CREAT BLD-MCNC: 0.71 MG/DL
DEPRECATED HBV CORE AB SER IA-ACNC: 173.9 NG/ML
EOSINOPHIL # BLD AUTO: 0.25 X10(3) UL (ref 0–0.7)
EOSINOPHIL NFR BLD AUTO: 3.1 %
ERYTHROCYTE [DISTWIDTH] IN BLOOD BY AUTOMATED COUNT: 12.4 %
EST. AVERAGE GLUCOSE BLD GHB EST-MCNC: 114 MG/DL (ref 68–126)
FASTING PATIENT LIPID ANSWER: YES
FASTING STATUS PATIENT QL REPORTED: YES
GFR SERPLBLD BASED ON 1.73 SQ M-ARVRAT: 94 ML/MIN/1.73M2 (ref 60–?)
GLOBULIN PLAS-MCNC: 3.5 G/DL (ref 2.8–4.4)
GLUCOSE BLD-MCNC: 91 MG/DL (ref 70–99)
GLUCOSE UR STRIP.AUTO-MCNC: NEGATIVE MG/DL
HBA1C MFR BLD: 5.6 % (ref ?–5.7)
HCT VFR BLD AUTO: 40.1 %
HDLC SERPL-MCNC: 54 MG/DL (ref 40–59)
HGB BLD-MCNC: 12.7 G/DL
IMM GRANULOCYTES # BLD AUTO: 0.03 X10(3) UL (ref 0–1)
IMM GRANULOCYTES NFR BLD: 0.4 %
IRON SATN MFR SERPL: 34 %
IRON SERPL-MCNC: 117 UG/DL
KETONES UR STRIP.AUTO-MCNC: NEGATIVE MG/DL
LDLC SERPL CALC-MCNC: 60 MG/DL (ref ?–100)
LYMPHOCYTES # BLD AUTO: 2.6 X10(3) UL (ref 1–4)
LYMPHOCYTES NFR BLD AUTO: 32.7 %
MCH RBC QN AUTO: 27.4 PG (ref 26–34)
MCHC RBC AUTO-ENTMCNC: 31.7 G/DL (ref 31–37)
MCV RBC AUTO: 86.4 FL
MONOCYTES # BLD AUTO: 0.55 X10(3) UL (ref 0.1–1)
MONOCYTES NFR BLD AUTO: 6.9 %
NEUTROPHILS # BLD AUTO: 4.44 X10 (3) UL (ref 1.5–7.7)
NEUTROPHILS # BLD AUTO: 4.44 X10(3) UL (ref 1.5–7.7)
NEUTROPHILS NFR BLD AUTO: 56 %
NITRITE UR QL STRIP.AUTO: NEGATIVE
NONHDLC SERPL-MCNC: 74 MG/DL (ref ?–130)
OSMOLALITY SERPL CALC.SUM OF ELEC: 279 MOSM/KG (ref 275–295)
PH UR STRIP.AUTO: 9 [PH] (ref 5–8)
PLATELET # BLD AUTO: 198 10(3)UL (ref 150–450)
POTASSIUM SERPL-SCNC: 4.1 MMOL/L (ref 3.5–5.1)
PROT SERPL-MCNC: 7.3 G/DL (ref 6.4–8.2)
PROT UR STRIP.AUTO-MCNC: NEGATIVE MG/DL
RBC # BLD AUTO: 4.64 X10(6)UL
RBC UR QL AUTO: NEGATIVE
SODIUM SERPL-SCNC: 135 MMOL/L (ref 136–145)
SP GR UR STRIP.AUTO: 1.01 (ref 1–1.03)
T4 FREE SERPL-MCNC: 1 NG/DL (ref 0.8–1.7)
TIBC SERPL-MCNC: 346 UG/DL (ref 240–450)
TRANSFERRIN SERPL-MCNC: 232 MG/DL (ref 200–360)
TRIGL SERPL-MCNC: 66 MG/DL (ref 30–149)
TSI SER-ACNC: 1.66 MIU/ML (ref 0.36–3.74)
UROBILINOGEN UR STRIP.AUTO-MCNC: <2 MG/DL
VIT D+METAB SERPL-MCNC: 37.1 NG/ML (ref 30–100)
VLDLC SERPL CALC-MCNC: 10 MG/DL (ref 0–30)
WBC # BLD AUTO: 7.9 X10(3) UL (ref 4–11)

## 2023-06-15 PROCEDURE — 77080 DXA BONE DENSITY AXIAL: CPT | Performed by: FAMILY MEDICINE

## 2023-06-15 PROCEDURE — 83036 HEMOGLOBIN GLYCOSYLATED A1C: CPT

## 2023-06-15 PROCEDURE — 81001 URINALYSIS AUTO W/SCOPE: CPT

## 2023-06-15 PROCEDURE — 77063 BREAST TOMOSYNTHESIS BI: CPT | Performed by: FAMILY MEDICINE

## 2023-06-15 PROCEDURE — 83540 ASSAY OF IRON: CPT

## 2023-06-15 PROCEDURE — 36415 COLL VENOUS BLD VENIPUNCTURE: CPT

## 2023-06-15 PROCEDURE — 82550 ASSAY OF CK (CPK): CPT

## 2023-06-15 PROCEDURE — 82306 VITAMIN D 25 HYDROXY: CPT

## 2023-06-15 PROCEDURE — 82728 ASSAY OF FERRITIN: CPT

## 2023-06-15 PROCEDURE — 84439 ASSAY OF FREE THYROXINE: CPT

## 2023-06-15 PROCEDURE — 84443 ASSAY THYROID STIM HORMONE: CPT

## 2023-06-15 PROCEDURE — 80061 LIPID PANEL: CPT

## 2023-06-15 PROCEDURE — 77067 SCR MAMMO BI INCL CAD: CPT | Performed by: FAMILY MEDICINE

## 2023-06-15 PROCEDURE — 85025 COMPLETE CBC W/AUTO DIFF WBC: CPT

## 2023-06-15 PROCEDURE — 80053 COMPREHEN METABOLIC PANEL: CPT

## 2023-06-15 PROCEDURE — 83550 IRON BINDING TEST: CPT

## 2023-06-15 RX ORDER — ATORVASTATIN CALCIUM 10 MG/1
10 TABLET, FILM COATED ORAL NIGHTLY
Qty: 90 TABLET | Refills: 0 | Status: SHIPPED | OUTPATIENT
Start: 2023-06-15

## 2023-06-19 ENCOUNTER — OFFICE VISIT (OUTPATIENT)
Dept: FAMILY MEDICINE CLINIC | Facility: CLINIC | Age: 68
End: 2023-06-19
Payer: MEDICARE

## 2023-06-19 VITALS
WEIGHT: 130 LBS | DIASTOLIC BLOOD PRESSURE: 74 MMHG | HEIGHT: 62.5 IN | HEART RATE: 90 BPM | OXYGEN SATURATION: 99 % | RESPIRATION RATE: 18 BRPM | SYSTOLIC BLOOD PRESSURE: 116 MMHG | BODY MASS INDEX: 23.33 KG/M2

## 2023-06-19 DIAGNOSIS — G47.00 INSOMNIA, UNSPECIFIED TYPE: ICD-10-CM

## 2023-06-19 DIAGNOSIS — K21.9 GASTROESOPHAGEAL REFLUX DISEASE, UNSPECIFIED WHETHER ESOPHAGITIS PRESENT: ICD-10-CM

## 2023-06-19 DIAGNOSIS — K44.9 HIATAL HERNIA: ICD-10-CM

## 2023-06-19 DIAGNOSIS — R79.89 HIGH SERUM HIGH DENSITY LIPOPROTEIN (HDL): ICD-10-CM

## 2023-06-19 DIAGNOSIS — Z00.00 ENCOUNTER FOR ANNUAL HEALTH EXAMINATION: Primary | ICD-10-CM

## 2023-06-19 DIAGNOSIS — Z71.85 VACCINE COUNSELING: ICD-10-CM

## 2023-06-19 DIAGNOSIS — Z86.19 HISTORY OF HERPES GENITALIS: ICD-10-CM

## 2023-06-19 DIAGNOSIS — J45.20 MILD INTERMITTENT ASTHMA WITHOUT COMPLICATION: ICD-10-CM

## 2023-06-19 DIAGNOSIS — Z91.09 ENVIRONMENTAL ALLERGIES: ICD-10-CM

## 2023-06-19 DIAGNOSIS — D50.8 IRON DEFICIENCY ANEMIA SECONDARY TO INADEQUATE DIETARY IRON INTAKE: ICD-10-CM

## 2023-06-19 DIAGNOSIS — R73.9 HYPERGLYCEMIA: ICD-10-CM

## 2023-06-19 DIAGNOSIS — Z12.4 SCREENING FOR MALIGNANT NEOPLASM OF CERVIX: ICD-10-CM

## 2023-06-19 DIAGNOSIS — F41.9 ANXIETY: ICD-10-CM

## 2023-06-19 DIAGNOSIS — L71.9 ROSACEA, ACNE: ICD-10-CM

## 2023-06-19 DIAGNOSIS — M85.80 OSTEOPENIA, UNSPECIFIED LOCATION: ICD-10-CM

## 2023-06-19 DIAGNOSIS — Z79.899 MEDICATION MANAGEMENT: ICD-10-CM

## 2023-06-19 DIAGNOSIS — Z78.0 MENOPAUSE: ICD-10-CM

## 2023-06-19 DIAGNOSIS — Z86.19 HISTORY OF COLD SORES: ICD-10-CM

## 2023-06-19 DIAGNOSIS — L92.0 GRANULOMA ANNULARE: ICD-10-CM

## 2023-06-19 DIAGNOSIS — E55.9 VITAMIN D DEFICIENCY: ICD-10-CM

## 2023-06-19 DIAGNOSIS — K51.90 ULCERATIVE COLITIS WITHOUT COMPLICATIONS, UNSPECIFIED LOCATION (HCC): ICD-10-CM

## 2023-06-19 DIAGNOSIS — Z86.39 HISTORY OF HYPOTHYROIDISM: ICD-10-CM

## 2023-06-19 DIAGNOSIS — E78.5 DYSLIPIDEMIA: ICD-10-CM

## 2023-06-19 DIAGNOSIS — I25.10 CORONARY ARTERY DISEASE INVOLVING NATIVE CORONARY ARTERY OF NATIVE HEART WITHOUT ANGINA PECTORIS: ICD-10-CM

## 2023-06-19 DIAGNOSIS — I10 ESSENTIAL HYPERTENSION: ICD-10-CM

## 2023-08-13 DIAGNOSIS — K21.9 GASTROESOPHAGEAL REFLUX DISEASE, UNSPECIFIED WHETHER ESOPHAGITIS PRESENT: ICD-10-CM

## 2023-08-14 RX ORDER — CIMETIDINE 300 MG/1
TABLET, FILM COATED ORAL
Qty: 360 TABLET | Refills: 0 | Status: SHIPPED | OUTPATIENT
Start: 2023-08-14

## 2023-08-14 NOTE — TELEPHONE ENCOUNTER
Last office visit: 6/19/2023   Protocol: pass  Requested medication(s) are due for refill today: yes  Requested medication(s) are on the active medication list same strength, form, dose/ sig: yes  Requested medication(s) are managed by provider: yes  Patient has already received a courtsey refill: no

## 2023-08-19 DIAGNOSIS — I10 ESSENTIAL HYPERTENSION: ICD-10-CM

## 2023-08-21 RX ORDER — SPIRONOLACTONE 25 MG/1
TABLET ORAL
Qty: 90 TABLET | Refills: 1 | Status: SHIPPED | OUTPATIENT
Start: 2023-08-21

## 2023-09-11 DIAGNOSIS — Z79.899 MEDICATION MANAGEMENT: ICD-10-CM

## 2023-09-11 DIAGNOSIS — I25.10 CORONARY ARTERY DISEASE INVOLVING NATIVE CORONARY ARTERY OF NATIVE HEART WITHOUT ANGINA PECTORIS: ICD-10-CM

## 2023-09-11 RX ORDER — ATORVASTATIN CALCIUM 10 MG/1
10 TABLET, FILM COATED ORAL NIGHTLY
Qty: 90 TABLET | Refills: 1 | Status: SHIPPED | OUTPATIENT
Start: 2023-09-11

## 2023-09-15 ENCOUNTER — PATIENT MESSAGE (OUTPATIENT)
Dept: FAMILY MEDICINE CLINIC | Facility: CLINIC | Age: 68
End: 2023-09-15

## 2023-10-26 ENCOUNTER — OFFICE VISIT (OUTPATIENT)
Dept: FAMILY MEDICINE CLINIC | Facility: CLINIC | Age: 68
End: 2023-10-26

## 2023-10-26 VITALS
SYSTOLIC BLOOD PRESSURE: 110 MMHG | RESPIRATION RATE: 18 BRPM | OXYGEN SATURATION: 96 % | DIASTOLIC BLOOD PRESSURE: 70 MMHG | BODY MASS INDEX: 23.5 KG/M2 | WEIGHT: 131 LBS | HEIGHT: 62.5 IN | HEART RATE: 70 BPM

## 2023-10-26 DIAGNOSIS — H92.02 DISCOMFORT OF LEFT EAR: Primary | ICD-10-CM

## 2023-10-26 PROCEDURE — 3078F DIAST BP <80 MM HG: CPT | Performed by: STUDENT IN AN ORGANIZED HEALTH CARE EDUCATION/TRAINING PROGRAM

## 2023-10-26 PROCEDURE — 99213 OFFICE O/P EST LOW 20 MIN: CPT | Performed by: STUDENT IN AN ORGANIZED HEALTH CARE EDUCATION/TRAINING PROGRAM

## 2023-10-26 PROCEDURE — 3074F SYST BP LT 130 MM HG: CPT | Performed by: STUDENT IN AN ORGANIZED HEALTH CARE EDUCATION/TRAINING PROGRAM

## 2023-10-26 PROCEDURE — 3008F BODY MASS INDEX DOCD: CPT | Performed by: STUDENT IN AN ORGANIZED HEALTH CARE EDUCATION/TRAINING PROGRAM

## 2023-10-26 PROCEDURE — 1170F FXNL STATUS ASSESSED: CPT | Performed by: STUDENT IN AN ORGANIZED HEALTH CARE EDUCATION/TRAINING PROGRAM

## 2023-10-26 PROCEDURE — 1159F MED LIST DOCD IN RCRD: CPT | Performed by: STUDENT IN AN ORGANIZED HEALTH CARE EDUCATION/TRAINING PROGRAM

## 2023-10-26 PROCEDURE — 1160F RVW MEDS BY RX/DR IN RCRD: CPT | Performed by: STUDENT IN AN ORGANIZED HEALTH CARE EDUCATION/TRAINING PROGRAM

## 2023-10-26 RX ORDER — SODIUM, POTASSIUM,MAG SULFATES 17.5-3.13G
SOLUTION, RECONSTITUTED, ORAL ORAL
COMMUNITY
Start: 2023-07-17

## 2023-10-26 NOTE — PATIENT INSTRUCTIONS
What are the eustachian tubes, and how do they get blocked? The eustachian (say \"you-STAY-shee-un\") tubes connect the middle ears to the back of the throat. The tubes help the ears drain fluid. They also keep air pressure in the ears at the right level. When you swallow or yawn, the tubes open briefly to let air in to make the pressure in the middle ears equal to the pressure outside of the ears. Sometimes fluid or negative pressure gets stuck in the middle ear. The pressure outside the ear gets too high. This causes ear pain and sometimes trouble hearing. What causes blocked eustachian tubes? Swelling from a cold, allergies, or a sinus infection can keep the eustachian tubes from opening. This leads to pressure changes. Fluid may collect in the middle ear. The pressure and fluid can cause pain. You also can have ear pain from changes in pressure while you are flying in an airplane, driving up or down mountains, or scuba diving. Fluid in the ear can lead to an infection (acute otitis media). Early Fallow children have a high risk of ear infections, because their eustachian tubes are shorter and more easily blocked than the tubes in older children and adults. What are the symptoms? Blocked eustachian tubes can cause several symptoms, including:    Ears that hurt and feel full. Ringing or popping noises in your ears. Hearing problems. Feeling a little dizzy. How are they treated? Blocked eustachian tubes often get better on their own. You may be able to open the blocked tubes with a simple exercise:     1) Close your mouth, hold your nose, and gently blow as if you are blowing your nose. 2) Yawning and chewing gum also may help. 3) Sometimes over-the-counter nasal sprays such as flonase can also provide relief     You may hear or feel a \"pop\" when the tubes open to make the pressure equal between the inside and outside of your ears.

## 2023-11-08 ENCOUNTER — PATIENT MESSAGE (OUTPATIENT)
Dept: FAMILY MEDICINE CLINIC | Facility: CLINIC | Age: 68
End: 2023-11-08

## 2023-11-08 NOTE — TELEPHONE ENCOUNTER
From: Dmitry Chapman  To: Lexie Alexander  Sent: 11/8/2023 11:02 AM CST  Subject: RSV vaccine    Are there any side effects to the RSV vaccine.  I have gotten the flu and Covid vaccine but not sure about this one

## 2023-11-10 DIAGNOSIS — K21.9 GASTROESOPHAGEAL REFLUX DISEASE, UNSPECIFIED WHETHER ESOPHAGITIS PRESENT: ICD-10-CM

## 2023-11-10 RX ORDER — CIMETIDINE 300 MG/1
TABLET, FILM COATED ORAL
Qty: 360 TABLET | Refills: 0 | Status: SHIPPED | OUTPATIENT
Start: 2023-11-10

## 2023-12-11 ENCOUNTER — LAB ENCOUNTER (OUTPATIENT)
Dept: LAB | Age: 68
End: 2023-12-11
Attending: FAMILY MEDICINE
Payer: MEDICARE

## 2023-12-11 DIAGNOSIS — I10 ESSENTIAL HYPERTENSION: ICD-10-CM

## 2023-12-11 DIAGNOSIS — E55.9 VITAMIN D DEFICIENCY: ICD-10-CM

## 2023-12-11 DIAGNOSIS — R73.9 HYPERGLYCEMIA: ICD-10-CM

## 2023-12-11 DIAGNOSIS — I25.10 CORONARY ARTERY DISEASE INVOLVING NATIVE CORONARY ARTERY OF NATIVE HEART WITHOUT ANGINA PECTORIS: ICD-10-CM

## 2023-12-11 DIAGNOSIS — R79.89 HIGH SERUM HIGH DENSITY LIPOPROTEIN (HDL): ICD-10-CM

## 2023-12-11 DIAGNOSIS — E78.5 DYSLIPIDEMIA: ICD-10-CM

## 2023-12-11 LAB
ALBUMIN SERPL-MCNC: 4.1 G/DL (ref 3.4–5)
ALBUMIN/GLOB SERPL: 1.2 {RATIO} (ref 1–2)
ALP LIVER SERPL-CCNC: 62 U/L
ALT SERPL-CCNC: 21 U/L
ANION GAP SERPL CALC-SCNC: 4 MMOL/L (ref 0–18)
AST SERPL-CCNC: 17 U/L (ref 15–37)
BILIRUB SERPL-MCNC: 1 MG/DL (ref 0.1–2)
BUN BLD-MCNC: 12 MG/DL (ref 9–23)
CALCIUM BLD-MCNC: 8.6 MG/DL (ref 8.5–10.1)
CHLORIDE SERPL-SCNC: 104 MMOL/L (ref 98–112)
CHOLEST SERPL-MCNC: 131 MG/DL (ref ?–200)
CO2 SERPL-SCNC: 29 MMOL/L (ref 21–32)
CREAT BLD-MCNC: 1.04 MG/DL
EGFRCR SERPLBLD CKD-EPI 2021: 59 ML/MIN/1.73M2 (ref 60–?)
EST. AVERAGE GLUCOSE BLD GHB EST-MCNC: 114 MG/DL (ref 68–126)
FASTING PATIENT LIPID ANSWER: YES
FASTING STATUS PATIENT QL REPORTED: YES
GLOBULIN PLAS-MCNC: 3.5 G/DL (ref 2.8–4.4)
GLUCOSE BLD-MCNC: 91 MG/DL (ref 70–99)
HBA1C MFR BLD: 5.6 % (ref ?–5.7)
HDLC SERPL-MCNC: 56 MG/DL (ref 40–59)
LDLC SERPL CALC-MCNC: 64 MG/DL (ref ?–100)
NONHDLC SERPL-MCNC: 75 MG/DL (ref ?–130)
OSMOLALITY SERPL CALC.SUM OF ELEC: 283 MOSM/KG (ref 275–295)
POTASSIUM SERPL-SCNC: 4.1 MMOL/L (ref 3.5–5.1)
PROT SERPL-MCNC: 7.6 G/DL (ref 6.4–8.2)
SODIUM SERPL-SCNC: 137 MMOL/L (ref 136–145)
TRIGL SERPL-MCNC: 46 MG/DL (ref 30–149)
VLDLC SERPL CALC-MCNC: 7 MG/DL (ref 0–30)

## 2023-12-11 PROCEDURE — 36415 COLL VENOUS BLD VENIPUNCTURE: CPT

## 2023-12-11 PROCEDURE — 80053 COMPREHEN METABOLIC PANEL: CPT

## 2023-12-11 PROCEDURE — 80061 LIPID PANEL: CPT

## 2023-12-11 PROCEDURE — 83036 HEMOGLOBIN GLYCOSYLATED A1C: CPT

## 2023-12-11 PROCEDURE — 82652 VIT D 1 25-DIHYDROXY: CPT

## 2023-12-13 LAB — VIT D 1,25 DI-OH: 32 PG/ML

## 2023-12-19 ENCOUNTER — OFFICE VISIT (OUTPATIENT)
Dept: FAMILY MEDICINE CLINIC | Facility: CLINIC | Age: 68
End: 2023-12-19
Payer: MEDICARE

## 2023-12-19 ENCOUNTER — TELEPHONE (OUTPATIENT)
Dept: FAMILY MEDICINE CLINIC | Facility: CLINIC | Age: 68
End: 2023-12-19

## 2023-12-19 VITALS
RESPIRATION RATE: 18 BRPM | SYSTOLIC BLOOD PRESSURE: 118 MMHG | HEART RATE: 72 BPM | DIASTOLIC BLOOD PRESSURE: 68 MMHG | WEIGHT: 131 LBS | HEIGHT: 62.5 IN | BODY MASS INDEX: 23.5 KG/M2

## 2023-12-19 DIAGNOSIS — G47.00 INSOMNIA, UNSPECIFIED TYPE: ICD-10-CM

## 2023-12-19 DIAGNOSIS — Z86.19 HISTORY OF COLD SORES: ICD-10-CM

## 2023-12-19 DIAGNOSIS — E55.9 VITAMIN D DEFICIENCY: ICD-10-CM

## 2023-12-19 DIAGNOSIS — Z79.899 MEDICATION MANAGEMENT: ICD-10-CM

## 2023-12-19 DIAGNOSIS — L92.0 GRANULOMA ANNULARE: ICD-10-CM

## 2023-12-19 DIAGNOSIS — K44.9 HIATAL HERNIA: ICD-10-CM

## 2023-12-19 DIAGNOSIS — I25.10 CORONARY ARTERY DISEASE INVOLVING NATIVE CORONARY ARTERY OF NATIVE HEART WITHOUT ANGINA PECTORIS: Primary | ICD-10-CM

## 2023-12-19 DIAGNOSIS — Z91.09 ENVIRONMENTAL ALLERGIES: ICD-10-CM

## 2023-12-19 DIAGNOSIS — L71.9 ROSACEA, ACNE: ICD-10-CM

## 2023-12-19 DIAGNOSIS — K21.9 GASTROESOPHAGEAL REFLUX DISEASE, UNSPECIFIED WHETHER ESOPHAGITIS PRESENT: ICD-10-CM

## 2023-12-19 DIAGNOSIS — K51.90 ULCERATIVE COLITIS WITHOUT COMPLICATIONS, UNSPECIFIED LOCATION (HCC): ICD-10-CM

## 2023-12-19 DIAGNOSIS — M85.80 OSTEOPENIA, UNSPECIFIED LOCATION: ICD-10-CM

## 2023-12-19 DIAGNOSIS — Z71.85 VACCINE COUNSELING: ICD-10-CM

## 2023-12-19 DIAGNOSIS — Z78.0 MENOPAUSE: ICD-10-CM

## 2023-12-19 DIAGNOSIS — I10 ESSENTIAL HYPERTENSION: ICD-10-CM

## 2023-12-19 DIAGNOSIS — Z86.19 HISTORY OF HERPES GENITALIS: ICD-10-CM

## 2023-12-19 DIAGNOSIS — F41.9 ANXIETY: ICD-10-CM

## 2023-12-19 DIAGNOSIS — J45.20 MILD INTERMITTENT ASTHMA WITHOUT COMPLICATION: ICD-10-CM

## 2023-12-19 DIAGNOSIS — E78.5 DYSLIPIDEMIA: ICD-10-CM

## 2023-12-19 DIAGNOSIS — D50.8 IRON DEFICIENCY ANEMIA SECONDARY TO INADEQUATE DIETARY IRON INTAKE: ICD-10-CM

## 2023-12-19 DIAGNOSIS — R73.9 HYPERGLYCEMIA: ICD-10-CM

## 2023-12-19 DIAGNOSIS — Z86.39 HISTORY OF HYPOTHYROIDISM: ICD-10-CM

## 2023-12-19 PROBLEM — N18.30 CKD (CHRONIC KIDNEY DISEASE) STAGE 3, GFR 30-59 ML/MIN (HCC): Chronic | Status: ACTIVE | Noted: 2023-12-19

## 2023-12-19 PROCEDURE — 1170F FXNL STATUS ASSESSED: CPT | Performed by: FAMILY MEDICINE

## 2023-12-19 PROCEDURE — 3008F BODY MASS INDEX DOCD: CPT | Performed by: FAMILY MEDICINE

## 2023-12-19 PROCEDURE — 3074F SYST BP LT 130 MM HG: CPT | Performed by: FAMILY MEDICINE

## 2023-12-19 PROCEDURE — 99499 UNLISTED E&M SERVICE: CPT | Performed by: FAMILY MEDICINE

## 2023-12-19 PROCEDURE — 3078F DIAST BP <80 MM HG: CPT | Performed by: FAMILY MEDICINE

## 2023-12-19 PROCEDURE — 99214 OFFICE O/P EST MOD 30 MIN: CPT | Performed by: FAMILY MEDICINE

## 2023-12-19 PROCEDURE — 1159F MED LIST DOCD IN RCRD: CPT | Performed by: FAMILY MEDICINE

## 2023-12-19 PROCEDURE — 1160F RVW MEDS BY RX/DR IN RCRD: CPT | Performed by: FAMILY MEDICINE

## 2023-12-19 RX ORDER — CIMETIDINE 300 MG/1
TABLET, FILM COATED ORAL
Qty: 360 TABLET | Refills: 1 | Status: SHIPPED | OUTPATIENT
Start: 2023-12-19 | End: 2023-12-20

## 2023-12-19 RX ORDER — CIMETIDINE 300 MG/1
TABLET, FILM COATED ORAL
Qty: 270 TABLET | Refills: 1 | Status: SHIPPED | OUTPATIENT
Start: 2023-12-19 | End: 2023-12-19 | Stop reason: DRUGHIGH

## 2023-12-19 RX ORDER — SPIRONOLACTONE 25 MG/1
25 TABLET ORAL DAILY
Qty: 90 TABLET | Refills: 1 | Status: SHIPPED | OUTPATIENT
Start: 2023-12-19

## 2023-12-19 RX ORDER — ATORVASTATIN CALCIUM 10 MG/1
10 TABLET, FILM COATED ORAL NIGHTLY
Qty: 90 TABLET | Refills: 1 | Status: SHIPPED | OUTPATIENT
Start: 2023-12-19

## 2023-12-20 RX ORDER — CIMETIDINE 300 MG/1
TABLET, FILM COATED ORAL
COMMUNITY
Start: 2023-12-20

## 2024-01-15 ENCOUNTER — OFFICE VISIT (OUTPATIENT)
Dept: FAMILY MEDICINE CLINIC | Facility: CLINIC | Age: 69
End: 2024-01-15
Payer: MEDICARE

## 2024-01-15 VITALS
DIASTOLIC BLOOD PRESSURE: 70 MMHG | TEMPERATURE: 98 F | HEART RATE: 80 BPM | SYSTOLIC BLOOD PRESSURE: 118 MMHG | BODY MASS INDEX: 23.86 KG/M2 | RESPIRATION RATE: 18 BRPM | HEIGHT: 62.5 IN | OXYGEN SATURATION: 98 % | WEIGHT: 133 LBS

## 2024-01-15 DIAGNOSIS — K21.9 GASTROESOPHAGEAL REFLUX DISEASE, UNSPECIFIED WHETHER ESOPHAGITIS PRESENT: ICD-10-CM

## 2024-01-15 DIAGNOSIS — B37.0 THRUSH: Primary | ICD-10-CM

## 2024-01-15 DIAGNOSIS — F43.0 STRESS REACTION: ICD-10-CM

## 2024-01-15 DIAGNOSIS — J45.20 MILD INTERMITTENT ASTHMA WITHOUT COMPLICATION: ICD-10-CM

## 2024-01-15 DIAGNOSIS — K51.90 ULCERATIVE COLITIS WITHOUT COMPLICATIONS, UNSPECIFIED LOCATION (HCC): ICD-10-CM

## 2024-01-15 DIAGNOSIS — K44.9 HIATAL HERNIA: ICD-10-CM

## 2024-01-15 PROCEDURE — 1160F RVW MEDS BY RX/DR IN RCRD: CPT | Performed by: FAMILY MEDICINE

## 2024-01-15 PROCEDURE — 99214 OFFICE O/P EST MOD 30 MIN: CPT | Performed by: FAMILY MEDICINE

## 2024-01-15 PROCEDURE — 3078F DIAST BP <80 MM HG: CPT | Performed by: FAMILY MEDICINE

## 2024-01-15 PROCEDURE — 1159F MED LIST DOCD IN RCRD: CPT | Performed by: FAMILY MEDICINE

## 2024-01-15 PROCEDURE — 3074F SYST BP LT 130 MM HG: CPT | Performed by: FAMILY MEDICINE

## 2024-01-15 PROCEDURE — 3008F BODY MASS INDEX DOCD: CPT | Performed by: FAMILY MEDICINE

## 2024-01-15 PROCEDURE — 1170F FXNL STATUS ASSESSED: CPT | Performed by: FAMILY MEDICINE

## 2024-01-15 NOTE — PROGRESS NOTES
Jennifer Landeros is a 69 year old female.  HPI:   Patient complains of seeing white on her tongue as well as a red lesion on her tongue being sensitive.  It reminded her of having thrush in the past when she was using her her inhaler for asthma.  She states she has not been using an inhaler recently.  There has been no recent antibiotic use.  Unfortunately there has been a lot of stress since her son age 37, with 2 children ages 6 and 4 is going through a divorce and he told her this year.  She states she has noted more mucus recently and has been trying to cough it up and does have a history of acid reflux.  We had a long discussion as possible causes to her thrush.  Current Outpatient Medications   Medication Sig Dispense Refill    cimetidine 300 MG Oral Tab Take 2 tablets (600 mg total) by mouth every morning AND 1 tablet (300 mg total) every evening.      spironolactone 25 MG Oral Tab Take 1 tablet (25 mg total) by mouth daily. 90 tablet 1    atorvastatin 10 MG Oral Tab Take 1 tablet (10 mg total) by mouth nightly. 90 tablet 1    Na Sulfate-K Sulfate-Mg Sulf 17.5-3.13-1.6 GM/177ML Oral Solution Drink one bottle diluted to a final volume of 480 ml (16 oz) followed by two glasses of water (16 oz) over the next hour. Drink a second bottle 6 hours prior to the colonoscopy followed by 2 glasses of water over the next hour (Patient not taking: Reported on 12/19/2023)      albuterol 108 (90 Base) MCG/ACT Inhalation Aero Soln Inhale 2 puffs into the lungs every 4 to 6 hours as needed for Wheezing. 1 each 0    Magnesium 125 MG Oral Cap       Fexofenadine HCl 180 MG Oral Tab Take 1 tablet (180 mg total) by mouth daily as needed.      ValACYclovir HCl 1 G Oral Tab Take 2 tablets by mouth twice a day as directed,  for outbeaks 16 tablet 1    Cholecalciferol (VITAMIN D) 2000 UNITS Oral Cap Take 1 capsule (2,000 Units total) by mouth daily.      Balsalazide Disodium (COLAZAL) 750 MG Oral Cap 3 capsules (2,250 mg total)  daily.  3     No current facility-administered medications for this visit.      Allergies   Allergen Reactions    Seasonal       Past Medical History:   Diagnosis Date    Anemia 2017     delivery, without mention of indication, unspecified as to episode of care(669.70)     Elevated blood pressure reading without diagnosis of hypertension     Esophageal reflux     Palpitations     Personal history of urinary (tract) infection     Unspecified asthma(493.90)     Unspecified sinusitis (chronic)       Social History:  Social History     Socioeconomic History    Marital status:    Tobacco Use    Smoking status: Never    Smokeless tobacco: Never   Vaping Use    Vaping Use: Never used   Substance and Sexual Activity    Alcohol use: Yes     Alcohol/week: 0.0 standard drinks of alcohol     Comment: wine 3 nights a week     Drug use: No    Sexual activity: Yes   Other Topics Concern    Caffeine Concern Yes     Comment: coffee in am     Exercise Yes     Comment: daily         Results for orders placed or performed in visit on 23   Comp Metabolic Panel (14)   Result Value Ref Range    Glucose 91 70 - 99 mg/dL    Sodium 137 136 - 145 mmol/L    Potassium 4.1 3.5 - 5.1 mmol/L    Chloride 104 98 - 112 mmol/L    CO2 29.0 21.0 - 32.0 mmol/L    Anion Gap 4 0 - 18 mmol/L    BUN 12 9 - 23 mg/dL    Creatinine 1.04 (H) 0.55 - 1.02 mg/dL    Calcium, Total 8.6 8.5 - 10.1 mg/dL    Calculated Osmolality 283 275 - 295 mOsm/kg    eGFR-Cr 59 (L) >=60 mL/min/1.73m2    AST 17 15 - 37 U/L    ALT 21 13 - 56 U/L    Alkaline Phosphatase 62 55 - 142 U/L    Bilirubin, Total 1.0 0.1 - 2.0 mg/dL    Total Protein 7.6 6.4 - 8.2 g/dL    Albumin 4.1 3.4 - 5.0 g/dL    Globulin  3.5 2.8 - 4.4 g/dL    A/G Ratio 1.2 1.0 - 2.0    Patient Fasting for CMP? Yes    Lipid Panel   Result Value Ref Range    Cholesterol, Total 131 <200 mg/dL    HDL Cholesterol 56 40 - 59 mg/dL    Triglycerides 46 30 - 149 mg/dL    LDL Cholesterol 64 <100 mg/dL     VLDL 7 0 - 30 mg/dL    Non HDL Chol 75 <130 mg/dL    Patient Fasting for Lipid? Yes    Hemoglobin A1C   Result Value Ref Range    HgbA1C 5.6 <5.7 %    Estimated Average Glucose 114 68 - 126 mg/dL   Vitamin D, 1,25 Dihydroxy   Result Value Ref Range    Vit D 1,25 di-OH 32.0 24.8 - 81.5 pg/mL       REVIEW OF SYSTEMS:   GENERAL: feels well otherwise  SKIN: denies any unusual skin lesions  LUNGS: denies shortness of breath with exertion  CARDIOVASCULAR: denies chest pain on exertion  GI: denies abdominal pain,denies heartburn  MUSCULOSKELETAL: denies back pain  EXTREMITIES:  No pain or numbness    EXAM:   /70 (BP Location: Right arm, Patient Position: Sitting, Cuff Size: adult)   Pulse 80   Temp 97.5 °F (36.4 °C) (Temporal)   Resp 18   Ht 5' 2.5\" (1.588 m)   Wt 133 lb (60.3 kg)   SpO2 98%   BMI 23.94 kg/m²   GENERAL: well developed, well nourished,in no apparent distress  SKIN: no rashes,no suspicious lesions  HEENT: atraumatic, normocephalic,ears and throat -- post nasal drip; white tongue with red spot on outerleft side of tongue and the tip of her tongue  NECK: supple,no adenopathy,no bruits  LUNGS: clear to auscultation  CARDIO: RRR without murmur  GI: soft, good BS's  EXTREMITIES: no cyanosis, clubbing or edema    ASSESSMENT AND PLAN:     Encounter Diagnoses   Name Primary?    Thrush Yes    Stress reaction     Ulcerative colitis without complications, unspecified location (HCC)     Mild intermittent asthma without complication     Hiatal hernia     Gastroesophageal reflux disease, unspecified whether esophagitis present        No orders of the defined types were placed in this encounter.      Meds & Refills for this Visit:  Requested Prescriptions     Signed Prescriptions Disp Refills    nystatin 185853 UNIT/ML Mouth/Throat Suspension 240 mL 0     Sig: Take 5 mL (500,000 Units total) by mouth 4 (four) times daily.       Imaging & Consults:  None    Advised nystatin swish and swallow.  Advised to  monitor symptoms.  Advised if symptoms do not improve, ask dentist's opinion.  If continues not to improve may need to see ENT  Monitor stress level.    The patient indicates understanding of these issues and agrees to the plan.  Return if symptoms worsen or fail to improve.

## 2024-01-22 ENCOUNTER — PATIENT MESSAGE (OUTPATIENT)
Dept: FAMILY MEDICINE CLINIC | Facility: CLINIC | Age: 69
End: 2024-01-22

## 2024-01-22 NOTE — TELEPHONE ENCOUNTER
From: Jennifer Landeros  To: Lexie Alexander  Sent: 1/22/2024 7:55 AM CST  Subject: Thrush    I have been taking the nystatin and my thrush is improving but not completely gone. It is day 7 and I have read it takes 7 to 14 days to go away. I only have about a days left of medication. I know we discussed a dose for a longer time and I’m wondering if I can get a refill so this goes away completely.

## 2024-02-12 DIAGNOSIS — K21.9 GASTROESOPHAGEAL REFLUX DISEASE, UNSPECIFIED WHETHER ESOPHAGITIS PRESENT: Primary | ICD-10-CM

## 2024-02-12 RX ORDER — CIMETIDINE 300 MG/1
600 TABLET, FILM COATED ORAL 2 TIMES DAILY
Qty: 180 TABLET | Refills: 1 | Status: SHIPPED | OUTPATIENT
Start: 2024-02-12

## 2024-02-12 NOTE — TELEPHONE ENCOUNTER
Last OV: 1/15/24  Thrush      Next OV:  6/19/24  SV               Received call from [facility] pharmacist to clarify Cimetidine dosage and send a new prescription:   Pt tells pharmacist she should be taking two tablets twice daily.    Last RX  in [facility]: 12/20/23  take two 300 mg tablets every morning and  one tablet every evening    OV note 12/19/23:  \"GERD -- stable on cimetidine -- but was only taking once at night and notes a cough at times -- advised to go back to 2 BID .\"    Order pended for your review?

## 2024-02-14 ENCOUNTER — PATIENT MESSAGE (OUTPATIENT)
Dept: FAMILY MEDICINE CLINIC | Facility: CLINIC | Age: 69
End: 2024-02-14

## 2024-02-14 DIAGNOSIS — K21.9 GASTROESOPHAGEAL REFLUX DISEASE, UNSPECIFIED WHETHER ESOPHAGITIS PRESENT: ICD-10-CM

## 2024-02-14 RX ORDER — CIMETIDINE 300 MG/1
600 TABLET, FILM COATED ORAL 2 TIMES DAILY
Qty: 360 TABLET | Refills: 1 | Status: SHIPPED | OUTPATIENT
Start: 2024-02-14

## 2024-02-14 NOTE — TELEPHONE ENCOUNTER
From: Jennifer Landeros  To: Lexie Alexander  Sent: 2/14/2024 12:14 PM CST  Subject: Cimetidine refills    I just picked up my cimetidine medicine and you only put in a 45 day quantity and 1 refill. This is a medicine I am on continuously. Can you update the prescription to 90 days and add more refills. If not please let me know why.   Thanks

## 2024-04-09 ENCOUNTER — OFFICE VISIT (OUTPATIENT)
Dept: FAMILY MEDICINE CLINIC | Facility: CLINIC | Age: 69
End: 2024-04-09
Payer: MEDICARE

## 2024-04-09 VITALS
OXYGEN SATURATION: 100 % | BODY MASS INDEX: 23.5 KG/M2 | WEIGHT: 131 LBS | SYSTOLIC BLOOD PRESSURE: 124 MMHG | HEIGHT: 62.5 IN | RESPIRATION RATE: 18 BRPM | TEMPERATURE: 99 F | HEART RATE: 71 BPM | DIASTOLIC BLOOD PRESSURE: 72 MMHG

## 2024-04-09 DIAGNOSIS — J06.9 ACUTE URI: Primary | ICD-10-CM

## 2024-04-09 LAB
COVID19 BINAX NOW ANTIGEN: NOT DETECTED
OPERATOR ID: NORMAL

## 2024-04-09 PROCEDURE — 99213 OFFICE O/P EST LOW 20 MIN: CPT | Performed by: FAMILY MEDICINE

## 2024-04-09 NOTE — PROGRESS NOTES
HPI:   Jennifer Landeros is a 69 year old female who presents for upper respiratory symptoms for  7  days. Patient reports congestion, cough is not keeping pt up at night, OTC cold meds have not been helping, prior history of bronchitis.    Current Outpatient Medications   Medication Sig Dispense Refill    cimetidine 300 MG Oral Tab Take 2 tablets (600 mg total) by mouth 2 (two) times daily. 360 tablet 1    nystatin 257837 UNIT/ML Mouth/Throat Suspension Take 5 mL (500,000 Units total) by mouth 4 (four) times daily. 240 mL 0    spironolactone 25 MG Oral Tab Take 1 tablet (25 mg total) by mouth daily. 90 tablet 1    atorvastatin 10 MG Oral Tab Take 1 tablet (10 mg total) by mouth nightly. 90 tablet 1    albuterol 108 (90 Base) MCG/ACT Inhalation Aero Soln Inhale 2 puffs into the lungs every 4 to 6 hours as needed for Wheezing. 1 each 0    Magnesium 125 MG Oral Cap       Fexofenadine HCl 180 MG Oral Tab Take 1 tablet (180 mg total) by mouth daily as needed.      ValACYclovir HCl 1 G Oral Tab Take 2 tablets by mouth twice a day as directed,  for outbeaks 16 tablet 1    Cholecalciferol (VITAMIN D) 2000 UNITS Oral Cap Take 1 capsule (2,000 Units total) by mouth daily.      Balsalazide Disodium (COLAZAL) 750 MG Oral Cap 3 capsules (2,250 mg total) daily.  3    Na Sulfate-K Sulfate-Mg Sulf 17.5-3.13-1.6 GM/177ML Oral Solution Drink one bottle diluted to a final volume of 480 ml (16 oz) followed by two glasses of water (16 oz) over the next hour. Drink a second bottle 6 hours prior to the colonoscopy followed by 2 glasses of water over the next hour (Patient not taking: Reported on 2023)        Past Medical History:   Diagnosis Date    Anemia 2017     delivery, without mention of indication, unspecified as to episode of care(312.39)     Elevated blood pressure reading without diagnosis of hypertension     Esophageal reflux     Palpitations     Personal history of urinary (tract) infection     Unspecified  asthma(493.90)     Unspecified sinusitis (chronic)       Past Surgical History:   Procedure Laterality Date          COLONOSCOPY  05/14/10;     due every 2 years per Dr. Luo    COLONOSCOPY  7/15    JEFFREY BIOPSY STEREO NODULE 1 SITE RIGHT (CPT=19081)   ?    benign     TONSILLECTOMY  1965      Family History   Problem Relation Age of Onset    Asthma Father     Cancer Mother     Stroke Mother     Hypertension Mother     Breast Cancer Mother 62      Social History     Socioeconomic History    Marital status:    Tobacco Use    Smoking status: Never    Smokeless tobacco: Never   Vaping Use    Vaping Use: Never used   Substance and Sexual Activity    Alcohol use: Yes     Alcohol/week: 0.0 standard drinks of alcohol     Comment: wine 3 nights a week     Drug use: No    Sexual activity: Yes   Other Topics Concern    Caffeine Concern Yes     Comment: coffee in am     Exercise Yes     Comment: daily          REVIEW OF SYSTEMS:   GENERAL: feels well otherwise  SKIN: no rashes  EYES:denies blurred vision or double vision  HEENT: congested; sore throat, ear pain, facial pain  LUNGS: denies shortness of breath with exertion; cough  CARDIOVASCULAR: denies chest pain on exertion  GI: no nausea or abdominal pain  NEURO: denies headaches    EXAM:   /72 (BP Location: Right arm, Patient Position: Sitting, Cuff Size: adult)   Pulse 71   Temp 98.6 °F (37 °C) (Oral)   Resp 18   Ht 5' 2.5\" (1.588 m)   Wt 131 lb (59.4 kg)   SpO2 100%   BMI 23.58 kg/m²   GENERAL: well developed, well nourished,in no apparent distress  SKIN: no rashes,no suspicious lesions  EYES:PERRL, EOMI,conjunctiva are clear  HEENT: atraumatic, normocephalic,ears  -- cerumen impaction right TM and throat are clear; swollen turbs right greater than left  NECK: supple,right post. Cervical  adenopathy,no bruits  LUNGS: clear to auscultation  CARDIO: RRR without murmur  GI: good BS's,no masses, HSM or tenderness    ASSESSMENT AND  PLAN:   Jennifer Landeros is a 69 year old female who presents with Upper Respiratory Infection. PLAN: OTC decongestants, throat lozenges and tylenol.  Antihistamine prn.  Patient takes Allegra in the morning.  Advised another antihistamine in the evening like Claritin or Zyrtec for a few days to help dry her up.  Advised she may use Flonase as needed to help dry up her sinuses as well.  Rapid COVID test was negative today.  Monitor symptoms.    Fluids.  The patient indicates understanding of these issues and agrees to the plan.  The patient is asked to return if sx's persist or worsen.

## 2024-04-10 ENCOUNTER — PATIENT MESSAGE (OUTPATIENT)
Dept: FAMILY MEDICINE CLINIC | Facility: CLINIC | Age: 69
End: 2024-04-10

## 2024-04-10 NOTE — TELEPHONE ENCOUNTER
From: Jennifer Landeros  To: Lexie Alexander  Sent: 4/10/2024 11:03 AM CDT  Subject: Cough    Hi. As expected my cough is getting worse. I believe you are not in on Thursdays so wanted to contact you today to see if I should get prednisone for the cough. Other symptoms are the same

## 2024-04-11 ENCOUNTER — OFFICE VISIT (OUTPATIENT)
Dept: FAMILY MEDICINE CLINIC | Facility: CLINIC | Age: 69
End: 2024-04-11
Payer: MEDICARE

## 2024-04-11 VITALS
WEIGHT: 131 LBS | HEIGHT: 62.5 IN | DIASTOLIC BLOOD PRESSURE: 74 MMHG | SYSTOLIC BLOOD PRESSURE: 112 MMHG | RESPIRATION RATE: 18 BRPM | TEMPERATURE: 96 F | HEART RATE: 76 BPM | BODY MASS INDEX: 23.5 KG/M2 | OXYGEN SATURATION: 98 %

## 2024-04-11 DIAGNOSIS — R05.1 ACUTE COUGH: Primary | ICD-10-CM

## 2024-04-11 DIAGNOSIS — J06.9 ACUTE URI: ICD-10-CM

## 2024-04-11 PROCEDURE — 99213 OFFICE O/P EST LOW 20 MIN: CPT | Performed by: FAMILY MEDICINE

## 2024-04-11 PROCEDURE — 96127 BRIEF EMOTIONAL/BEHAV ASSMT: CPT | Performed by: FAMILY MEDICINE

## 2024-04-11 RX ORDER — METHYLPREDNISOLONE 4 MG/1
TABLET ORAL
Qty: 1 EACH | Refills: 0 | Status: SHIPPED | OUTPATIENT
Start: 2024-04-11

## 2024-04-11 NOTE — PROGRESS NOTES
HPI:   Jennifer Landeros is a 69 year old female who presents for upper respiratory symptoms for  9  days. Patient reports congestion, cough is not keeping pt up at night, OTC cold meds have not been helping, prior history of bronchitis.  No the cough is getting deeper and having the grandkids over this weekend.    Current Outpatient Medications   Medication Sig Dispense Refill    methylPREDNISolone (MEDROL) 4 MG Oral Tablet Therapy Pack As directed. 1 each 0    cimetidine 300 MG Oral Tab Take 2 tablets (600 mg total) by mouth 2 (two) times daily. 360 tablet 1    nystatin 381201 UNIT/ML Mouth/Throat Suspension Take 5 mL (500,000 Units total) by mouth 4 (four) times daily. 240 mL 0    spironolactone 25 MG Oral Tab Take 1 tablet (25 mg total) by mouth daily. 90 tablet 1    atorvastatin 10 MG Oral Tab Take 1 tablet (10 mg total) by mouth nightly. 90 tablet 1    Na Sulfate-K Sulfate-Mg Sulf 17.5-3.13-1.6 GM/177ML Oral Solution       albuterol 108 (90 Base) MCG/ACT Inhalation Aero Soln Inhale 2 puffs into the lungs every 4 to 6 hours as needed for Wheezing. 1 each 0    Magnesium 125 MG Oral Cap       Fexofenadine HCl 180 MG Oral Tab Take 1 tablet (180 mg total) by mouth daily as needed.      ValACYclovir HCl 1 G Oral Tab Take 2 tablets by mouth twice a day as directed,  for outbeaks 16 tablet 1    Cholecalciferol (VITAMIN D) 2000 UNITS Oral Cap Take 1 capsule (2,000 Units total) by mouth daily.      Balsalazide Disodium (COLAZAL) 750 MG Oral Cap 3 capsules (2,250 mg total) daily.  3      Past Medical History:    Anemia     delivery, without mention of indication, unspecified as to episode of care(669.70)    Elevated blood pressure reading without diagnosis of hypertension    Esophageal reflux    Palpitations    Personal history of urinary (tract) infection    Unspecified asthma(493.90)    Unspecified sinusitis (chronic)      Past Surgical History:   Procedure Laterality Date          Colonoscopy   05/14/10; 2/13    due every 2 years per Dr. Luo    Colonoscopy  7/15    Kvng biopsy stereo nodule 1 site right (cpt=19081)  2010 ?    benign     Tonsillectomy  1965      Family History   Problem Relation Age of Onset    Asthma Father     Cancer Mother     Stroke Mother     Hypertension Mother     Breast Cancer Mother 62      Social History     Socioeconomic History    Marital status:    Tobacco Use    Smoking status: Never    Smokeless tobacco: Never   Vaping Use    Vaping status: Never Used   Substance and Sexual Activity    Alcohol use: Yes     Alcohol/week: 0.0 standard drinks of alcohol     Comment: wine 3 nights a week     Drug use: No    Sexual activity: Yes   Other Topics Concern    Caffeine Concern Yes     Comment: coffee in am     Exercise Yes     Comment: daily          REVIEW OF SYSTEMS:   GENERAL: feels well otherwise  SKIN: no rashes  EYES:denies blurred vision or double vision  HEENT: congested; sore throat, ear pain, facial pain  LUNGS: denies shortness of breath with exertion; cough  CARDIOVASCULAR: denies chest pain on exertion  GI: no nausea or abdominal pain  NEURO: denies headaches    EXAM:   /74 (BP Location: Right arm, Patient Position: Sitting, Cuff Size: adult)   Pulse 76   Temp (!) 96.4 °F (35.8 °C) (Temporal)   Resp 18   Ht 5' 2.5\" (1.588 m)   Wt 131 lb (59.4 kg)   SpO2 98%   BMI 23.58 kg/m²   GENERAL: well developed, well nourished,in no apparent distress  SKIN: no rashes,no suspicious lesions  EYES:PERRL, EOMI,conjunctiva are clear  HEENT: atraumatic, normocephalic,ears  -- cerumen impaction right TM and throat are clear; turbs wnl  NECK: supple,right no adenopathy,no bruits  LUNGS: clear to auscultation  CARDIO: RRR without murmur  GI: good BS's,no masses, HSM or tenderness    ASSESSMENT AND PLAN:   Jennifer Landeros is a 69 year old female who presents with Upper Respiratory Infection. PLAN: OTC decongestants, throat lozenges and tylenol.  Antihistamine prn.   Patient takes Allegra in the morning.  Advised Zyrtec for a few days to help dry her up.  Advised she may use Flonase as needed to help dry up her sinuses as well.  Monitor symptoms.  Advised medrol dose pack if cough deepens.  Fluids.      The patient indicates understanding of these issues and agrees to the plan.  The patient is asked to return if sx's persist or worsen.

## 2024-06-10 ENCOUNTER — LAB ENCOUNTER (OUTPATIENT)
Dept: LAB | Age: 69
End: 2024-06-10
Attending: FAMILY MEDICINE
Payer: MEDICARE

## 2024-06-10 DIAGNOSIS — I10 ESSENTIAL HYPERTENSION: ICD-10-CM

## 2024-06-10 DIAGNOSIS — E55.9 VITAMIN D DEFICIENCY: ICD-10-CM

## 2024-06-10 DIAGNOSIS — D50.8 IRON DEFICIENCY ANEMIA SECONDARY TO INADEQUATE DIETARY IRON INTAKE: ICD-10-CM

## 2024-06-10 DIAGNOSIS — E78.5 DYSLIPIDEMIA: ICD-10-CM

## 2024-06-10 LAB
ALBUMIN SERPL-MCNC: 4.1 G/DL (ref 3.4–5)
ALBUMIN/GLOB SERPL: 1.1 {RATIO} (ref 1–2)
ALP LIVER SERPL-CCNC: 58 U/L
ALT SERPL-CCNC: 19 U/L
ANION GAP SERPL CALC-SCNC: 5 MMOL/L (ref 0–18)
AST SERPL-CCNC: 27 U/L (ref 15–37)
BASOPHILS # BLD AUTO: 0.05 X10(3) UL (ref 0–0.2)
BASOPHILS NFR BLD AUTO: 0.6 %
BILIRUB SERPL-MCNC: 1.6 MG/DL (ref 0.1–2)
BILIRUB UR QL STRIP.AUTO: NEGATIVE
BUN BLD-MCNC: 12 MG/DL (ref 9–23)
CALCIUM BLD-MCNC: 9 MG/DL (ref 8.5–10.1)
CHLORIDE SERPL-SCNC: 101 MMOL/L (ref 98–112)
CHOLEST SERPL-MCNC: 139 MG/DL (ref ?–200)
CLARITY UR REFRACT.AUTO: CLEAR
CO2 SERPL-SCNC: 28 MMOL/L (ref 21–32)
COLOR UR AUTO: COLORLESS
CREAT BLD-MCNC: 0.92 MG/DL
DEPRECATED HBV CORE AB SER IA-ACNC: 235.6 NG/ML
EGFRCR SERPLBLD CKD-EPI 2021: 68 ML/MIN/1.73M2 (ref 60–?)
EOSINOPHIL # BLD AUTO: 0.2 X10(3) UL (ref 0–0.7)
EOSINOPHIL NFR BLD AUTO: 2.6 %
ERYTHROCYTE [DISTWIDTH] IN BLOOD BY AUTOMATED COUNT: 12.2 %
EST. AVERAGE GLUCOSE BLD GHB EST-MCNC: 111 MG/DL (ref 68–126)
FASTING PATIENT LIPID ANSWER: YES
FASTING STATUS PATIENT QL REPORTED: YES
GLOBULIN PLAS-MCNC: 3.8 G/DL (ref 2.8–4.4)
GLUCOSE BLD-MCNC: 93 MG/DL (ref 70–99)
GLUCOSE UR STRIP.AUTO-MCNC: NORMAL MG/DL
HBA1C MFR BLD: 5.5 % (ref ?–5.7)
HCT VFR BLD AUTO: 42 %
HDLC SERPL-MCNC: 59 MG/DL (ref 40–59)
HGB BLD-MCNC: 13.4 G/DL
IMM GRANULOCYTES # BLD AUTO: 0.03 X10(3) UL (ref 0–1)
IMM GRANULOCYTES NFR BLD: 0.4 %
IRON SATN MFR SERPL: 30 %
IRON SERPL-MCNC: 114 UG/DL
KETONES UR STRIP.AUTO-MCNC: NEGATIVE MG/DL
LDLC SERPL CALC-MCNC: 67 MG/DL (ref ?–100)
LEUKOCYTE ESTERASE UR QL STRIP.AUTO: NEGATIVE
LYMPHOCYTES # BLD AUTO: 2.66 X10(3) UL (ref 1–4)
LYMPHOCYTES NFR BLD AUTO: 34.3 %
MCH RBC QN AUTO: 27.6 PG (ref 26–34)
MCHC RBC AUTO-ENTMCNC: 31.9 G/DL (ref 31–37)
MCV RBC AUTO: 86.6 FL
MONOCYTES # BLD AUTO: 0.63 X10(3) UL (ref 0.1–1)
MONOCYTES NFR BLD AUTO: 8.1 %
NEUTROPHILS # BLD AUTO: 4.18 X10 (3) UL (ref 1.5–7.7)
NEUTROPHILS # BLD AUTO: 4.18 X10(3) UL (ref 1.5–7.7)
NEUTROPHILS NFR BLD AUTO: 54 %
NITRITE UR QL STRIP.AUTO: NEGATIVE
NONHDLC SERPL-MCNC: 80 MG/DL (ref ?–130)
OSMOLALITY SERPL CALC.SUM OF ELEC: 277 MOSM/KG (ref 275–295)
PH UR STRIP.AUTO: 6.5 [PH] (ref 5–8)
PLATELET # BLD AUTO: 189 10(3)UL (ref 150–450)
POTASSIUM SERPL-SCNC: 4.5 MMOL/L (ref 3.5–5.1)
PROT SERPL-MCNC: 7.9 G/DL (ref 6.4–8.2)
PROT UR STRIP.AUTO-MCNC: NEGATIVE MG/DL
RBC # BLD AUTO: 4.85 X10(6)UL
RBC UR QL AUTO: NEGATIVE
SODIUM SERPL-SCNC: 134 MMOL/L (ref 136–145)
SP GR UR STRIP.AUTO: 1.01 (ref 1–1.03)
T4 FREE SERPL-MCNC: 0.9 NG/DL (ref 0.8–1.7)
TIBC SERPL-MCNC: 386 UG/DL (ref 240–450)
TRANSFERRIN SERPL-MCNC: 259 MG/DL (ref 200–360)
TRIGL SERPL-MCNC: 63 MG/DL (ref 30–149)
TSI SER-ACNC: 1.95 MIU/ML (ref 0.36–3.74)
UROBILINOGEN UR STRIP.AUTO-MCNC: NORMAL MG/DL
VIT D+METAB SERPL-MCNC: 40.7 NG/ML (ref 30–100)
VLDLC SERPL CALC-MCNC: 9 MG/DL (ref 0–30)
WBC # BLD AUTO: 7.8 X10(3) UL (ref 4–11)

## 2024-06-10 PROCEDURE — 81003 URINALYSIS AUTO W/O SCOPE: CPT

## 2024-06-10 PROCEDURE — 83550 IRON BINDING TEST: CPT

## 2024-06-10 PROCEDURE — 36415 COLL VENOUS BLD VENIPUNCTURE: CPT

## 2024-06-10 PROCEDURE — 85025 COMPLETE CBC W/AUTO DIFF WBC: CPT

## 2024-06-10 PROCEDURE — 80061 LIPID PANEL: CPT

## 2024-06-10 PROCEDURE — 84443 ASSAY THYROID STIM HORMONE: CPT

## 2024-06-10 PROCEDURE — 84439 ASSAY OF FREE THYROXINE: CPT

## 2024-06-10 PROCEDURE — 82306 VITAMIN D 25 HYDROXY: CPT

## 2024-06-10 PROCEDURE — 83036 HEMOGLOBIN GLYCOSYLATED A1C: CPT

## 2024-06-10 PROCEDURE — 83540 ASSAY OF IRON: CPT

## 2024-06-10 PROCEDURE — 82728 ASSAY OF FERRITIN: CPT

## 2024-06-10 PROCEDURE — 80053 COMPREHEN METABOLIC PANEL: CPT

## 2024-06-11 DIAGNOSIS — E87.1 HYPONATREMIA: Primary | ICD-10-CM

## 2024-06-17 ENCOUNTER — LAB ENCOUNTER (OUTPATIENT)
Dept: LAB | Age: 69
End: 2024-06-17
Attending: FAMILY MEDICINE

## 2024-06-17 DIAGNOSIS — E87.1 HYPONATREMIA: ICD-10-CM

## 2024-06-17 LAB
ANION GAP SERPL CALC-SCNC: 4 MMOL/L (ref 0–18)
BUN BLD-MCNC: 14 MG/DL (ref 9–23)
CALCIUM BLD-MCNC: 9.2 MG/DL (ref 8.5–10.1)
CHLORIDE SERPL-SCNC: 106 MMOL/L (ref 98–112)
CO2 SERPL-SCNC: 28 MMOL/L (ref 21–32)
CREAT BLD-MCNC: 0.95 MG/DL
EGFRCR SERPLBLD CKD-EPI 2021: 66 ML/MIN/1.73M2 (ref 60–?)
FASTING STATUS PATIENT QL REPORTED: YES
GLUCOSE BLD-MCNC: 93 MG/DL (ref 70–99)
OSMOLALITY SERPL CALC.SUM OF ELEC: 286 MOSM/KG (ref 275–295)
POTASSIUM SERPL-SCNC: 4 MMOL/L (ref 3.5–5.1)
SODIUM SERPL-SCNC: 138 MMOL/L (ref 136–145)

## 2024-06-17 PROCEDURE — 36415 COLL VENOUS BLD VENIPUNCTURE: CPT

## 2024-06-17 PROCEDURE — 80048 BASIC METABOLIC PNL TOTAL CA: CPT

## 2024-07-02 ENCOUNTER — OFFICE VISIT (OUTPATIENT)
Dept: FAMILY MEDICINE CLINIC | Facility: CLINIC | Age: 69
End: 2024-07-02
Payer: MEDICARE

## 2024-07-02 VITALS
WEIGHT: 133 LBS | HEART RATE: 86 BPM | OXYGEN SATURATION: 99 % | HEIGHT: 63 IN | SYSTOLIC BLOOD PRESSURE: 110 MMHG | DIASTOLIC BLOOD PRESSURE: 68 MMHG | BODY MASS INDEX: 23.57 KG/M2 | RESPIRATION RATE: 18 BRPM

## 2024-07-02 DIAGNOSIS — Z86.39 HISTORY OF HYPOTHYROIDISM: ICD-10-CM

## 2024-07-02 DIAGNOSIS — E78.5 DYSLIPIDEMIA: ICD-10-CM

## 2024-07-02 DIAGNOSIS — E55.9 VITAMIN D DEFICIENCY: ICD-10-CM

## 2024-07-02 DIAGNOSIS — L92.0 GRANULOMA ANNULARE: ICD-10-CM

## 2024-07-02 DIAGNOSIS — Z00.00 ENCOUNTER FOR ANNUAL HEALTH EXAMINATION: Primary | ICD-10-CM

## 2024-07-02 DIAGNOSIS — K21.9 GASTROESOPHAGEAL REFLUX DISEASE, UNSPECIFIED WHETHER ESOPHAGITIS PRESENT: ICD-10-CM

## 2024-07-02 DIAGNOSIS — Z86.19 HISTORY OF COLD SORES: ICD-10-CM

## 2024-07-02 DIAGNOSIS — Z91.09 ENVIRONMENTAL ALLERGIES: ICD-10-CM

## 2024-07-02 DIAGNOSIS — Z78.0 MENOPAUSE: ICD-10-CM

## 2024-07-02 DIAGNOSIS — D50.8 IRON DEFICIENCY ANEMIA SECONDARY TO INADEQUATE DIETARY IRON INTAKE: ICD-10-CM

## 2024-07-02 DIAGNOSIS — I25.10 CORONARY ARTERY DISEASE INVOLVING NATIVE CORONARY ARTERY OF NATIVE HEART WITHOUT ANGINA PECTORIS: ICD-10-CM

## 2024-07-02 DIAGNOSIS — M54.50 ACUTE MIDLINE LOW BACK PAIN WITHOUT SCIATICA: ICD-10-CM

## 2024-07-02 DIAGNOSIS — M25.511 ACUTE PAIN OF RIGHT SHOULDER: ICD-10-CM

## 2024-07-02 DIAGNOSIS — K44.9 HIATAL HERNIA: ICD-10-CM

## 2024-07-02 DIAGNOSIS — Z71.85 VACCINE COUNSELING: ICD-10-CM

## 2024-07-02 DIAGNOSIS — I10 ESSENTIAL HYPERTENSION: ICD-10-CM

## 2024-07-02 DIAGNOSIS — K51.90 ULCERATIVE COLITIS WITHOUT COMPLICATIONS, UNSPECIFIED LOCATION (HCC): ICD-10-CM

## 2024-07-02 DIAGNOSIS — Z79.899 MEDICATION MANAGEMENT: ICD-10-CM

## 2024-07-02 DIAGNOSIS — F41.9 ANXIETY: ICD-10-CM

## 2024-07-02 DIAGNOSIS — Z86.19 HISTORY OF HERPES GENITALIS: ICD-10-CM

## 2024-07-02 DIAGNOSIS — L71.9 ROSACEA, ACNE: ICD-10-CM

## 2024-07-02 DIAGNOSIS — R79.89 HIGH SERUM HIGH DENSITY LIPOPROTEIN (HDL): ICD-10-CM

## 2024-07-02 DIAGNOSIS — H61.21 HEARING LOSS DUE TO CERUMEN IMPACTION, RIGHT: ICD-10-CM

## 2024-07-02 DIAGNOSIS — R73.9 HYPERGLYCEMIA: ICD-10-CM

## 2024-07-02 DIAGNOSIS — M85.80 OSTEOPENIA, UNSPECIFIED LOCATION: ICD-10-CM

## 2024-07-02 DIAGNOSIS — Z12.31 SCREENING MAMMOGRAM FOR BREAST CANCER: ICD-10-CM

## 2024-07-02 DIAGNOSIS — J45.20 MILD INTERMITTENT ASTHMA WITHOUT COMPLICATION (HCC): ICD-10-CM

## 2024-07-02 DIAGNOSIS — F43.0 STRESS REACTION: ICD-10-CM

## 2024-07-02 DIAGNOSIS — G47.00 INSOMNIA, UNSPECIFIED TYPE: ICD-10-CM

## 2024-07-02 PROBLEM — N18.30 CKD (CHRONIC KIDNEY DISEASE) STAGE 3, GFR 30-59 ML/MIN (HCC): Chronic | Status: RESOLVED | Noted: 2023-12-19 | Resolved: 2024-07-02

## 2024-07-02 PROCEDURE — G0439 PPPS, SUBSEQ VISIT: HCPCS | Performed by: FAMILY MEDICINE

## 2024-07-02 PROCEDURE — 96160 PT-FOCUSED HLTH RISK ASSMT: CPT | Performed by: FAMILY MEDICINE

## 2024-07-02 PROCEDURE — 99214 OFFICE O/P EST MOD 30 MIN: CPT | Performed by: FAMILY MEDICINE

## 2024-07-02 PROCEDURE — 99499 UNLISTED E&M SERVICE: CPT | Performed by: FAMILY MEDICINE

## 2024-07-02 RX ORDER — CIMETIDINE 300 MG/1
600 TABLET, FILM COATED ORAL 2 TIMES DAILY
Qty: 360 TABLET | Refills: 1 | Status: SHIPPED | OUTPATIENT
Start: 2024-07-02

## 2024-07-02 RX ORDER — SPIRONOLACTONE 25 MG/1
25 TABLET ORAL DAILY
Qty: 90 TABLET | Refills: 1 | Status: SHIPPED | OUTPATIENT
Start: 2024-07-02

## 2024-07-02 RX ORDER — ATORVASTATIN CALCIUM 10 MG/1
10 TABLET, FILM COATED ORAL NIGHTLY
Qty: 90 TABLET | Refills: 1 | Status: SHIPPED | OUTPATIENT
Start: 2024-07-02

## 2024-07-02 NOTE — PROGRESS NOTES
Subjective:   Jennifer Landeros is a 69 year old female who presents for a MA AHA (Medicare Advantage Annual Health Assessment) and Medicare Subsequent Annual Wellness visit (Pt already had Initial Annual Wellness) and scheduled follow up of multiple significant but stable problems, acute complicated new problem, and multiple concerns .   Patient states she has had a lot of stress this year.  Her 38-year-old son has recently gone through a horrible divorce.  Her 34-year-old daughter is trying to get pregnant with her .  Patient's daughter heard bad news from the fertility clinic today which brought her down.  Patient states that her daughter was told because she has not been able to get pregnant in a year, she has less than a 3% chance of getting pregnant going forward.  Patient is aware that the stress weighs on her.  Patient would like to know due to her insomnia, can she take half a Unisom and then later in the evening take another half if needed.  Patient would like me to check her ears out.  Patient would like to proceed with just a breast exam today and not a pelvic.  Patient complains of right shoulder pain which she has had in the past and states that physical therapy helped.  Recently since April, she complains of pain as she points over her deltoid on her right shoulder.  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  Patient states she is not sure if she would have come in for this problem if she had not had the appointment today.  Patient states that a couple weeks ago she also developed low back pain and that was bothering her as well but then now it is resolved for the most part but notes mild discomfort.  Patient states she does do her therapy exercises for her shoulder and she is not sure if it is helping or not helping.  She does admit that she feels that the shoulder pain is not any  worse.    Essential hypertension --  stable on sprionolactone  Coronary artery disease involving native coronary artery of native heart without angina pectoris/dyslipidemia -- stable on atorvastatin  Mild intermittent asthma without complication  -- stable; AAP and ACT done 6/2023  Gastroesophageal reflux disease, unspecified whether esophagitis present  -- stable on cimetidine-- per GI  PT. Would like me to check her ear for wax.  She produces a lot of wax.  Meds reviewed.      History/Other:   Fall Risk Assessment:   She has been screened for Falls and is low risk.      Cognitive Assessment:   She had a completely normal cognitive assessment - see flowsheet entries     Functional Ability/Status:   Jennifer Landeros has a completely normal functional assessment. See flowsheet for details.      Depression Screening (PHQ):  PHQ-2 SCORE: 0  , done 7/2/2024   If you checked off any problems, how difficult have these problems made it for you to do your work, take care of things at home, or get along with other people?: Not difficult at all    Last [place] Suicide Screening on 7/2/2024 was No Risk.     >15 minutes spent screening and counseling for depression    Advanced Directives:   She has a Living Will on file in TalkTo; reviewed and discussed documents with patient (and family/surrogate if present).  She has a Power of  for Health Care on file in TalkTo.  Patient has Advance Care Planning documents present in EMR. Reviewed documents with patient (and family/surrogate if present).      Patient Active Problem List   Diagnosis    Vitamin D deficiency    HSV infection    Essential hypertension    Other and unspecified noninfectious gastroenteritis and colitis(558.9)    Environmental allergies    Granuloma annulare    Anemia    Hypertrophy of tonsil    Hiatal hernia    Abnormal CT scan of heart    Coronary artery disease involving native coronary artery of native heart without angina pectoris    Bilateral hearing  loss due to cerumen impaction    History of cold sores    Osteopenia    History of herpes genitalis    Ulcerative colitis without complications, unspecified location ([facility])     Allergies:  She is allergic to seasonal.    Current Medications:  Outpatient Medications Marked as Taking for the 24 encounter (Office Visit) with Lexie Alexander, DO   Medication Sig    cimetidine 300 MG Oral Tab Take 2 tablets (600 mg total) by mouth 2 (two) times daily.    atorvastatin 10 MG Oral Tab Take 1 tablet (10 mg total) by mouth nightly.    spironolactone 25 MG Oral Tab Take 1 tablet (25 mg total) by mouth daily.    nystatin 610314 UNIT/ML Mouth/Throat Suspension Take 5 mL (500,000 Units total) by mouth 4 (four) times daily.    Na Sulfate-K Sulfate-Mg Sulf 17.5-3.13-1.6 GM/177ML Oral Solution     albuterol 108 (90 Base) MCG/ACT Inhalation Aero Soln Inhale 2 puffs into the lungs every 4 to 6 hours as needed for Wheezing.    Magnesium 125 MG Oral Cap     Fexofenadine HCl 180 MG Oral Tab Take 1 tablet (180 mg total) by mouth daily as needed.    ValACYclovir HCl 1 G Oral Tab Take 2 tablets by mouth twice a day as directed,  for outbeaks    Cholecalciferol (VITAMIN D) 2000 UNITS Oral Cap Take 1 capsule (2,000 Units total) by mouth daily.    Balsalazide Disodium (COLAZAL) 750 MG Oral Cap 3 capsules (2,250 mg total) daily.       Medical History:  She  has a past medical history of Anemia (2017),  delivery, without mention of indication, unspecified as to episode of care(669.70), Elevated blood pressure reading without diagnosis of hypertension, Esophageal reflux, Palpitations, Personal history of urinary (tract) infection, Unspecified asthma(493.90), and Unspecified sinusitis (chronic).  Surgical History:  She  has a past surgical history that includes colonoscopy (05/14/10; );  (, ); tonsillectomy (); gene biopsy stereo nodule 1 site right (cpt=19081) ( ?); and colonoscopy (7/15).   Family  History:  Her family history includes Asthma in her father; Breast Cancer (age of onset: 62) in her mother; Cancer in her mother; Hypertension in her mother; Stroke in her mother.  Social History:  She  reports that she has never smoked. She has never used smokeless tobacco. She reports current alcohol use. She reports that she does not use drugs.    Tobacco:  She has never smoked tobacco.    CAGE Alcohol Screen:   CAGE screening score of 0 on 7/2/2024, showing low risk of alcohol abuse.      Patient Care Team:  Lexie Alexander DO as PCP - General (Family Practice)  Halle Diamond PT as Physical Therapist    Review of Systems  GENERAL: feels well otherwise  SKIN: denies any unusual skin lesions  EYES: denies blurred vision or double vision  HEENT: denies nasal congestion, sinus pain or ST  LUNGS: denies shortness of breath with exertion  CARDIOVASCULAR: denies chest pain on exertion  GI: denies abdominal pain, denies heartburn  : denies dysuria, vaginal discharge or itching, no complaint of urinary incontinence   MUSCULOSKELETAL: denies back pain  NEURO: denies headaches  PSYCHE: denies depression or anxiety  HEMATOLOGIC: denies hx of anemia  ENDOCRINE: denies thyroid history  ALL/ASTHMA: denies hx of allergy or asthma    Objective:   Physical Exam  General Appearance:  Alert, cooperative, no distress, appears stated age   Head:  Normocephalic, without obvious abnormality, atraumatic   Eyes:  Conjunctiva/corneas clear, EOM's intact both eyes   Ears:  Normal TM's and external ear canals, cerumen impaction right ear   Nose: Nares normal, septum midline,mucosa normal, no drainage or sinus tenderness   Throat: Lips, mucosa, and tongue normal; teeth and gums normal   Neck: Supple, symmetrical, trachea midline, no adenopathy;  thyroid: not enlarged, symmetric, no tenderness/mass/nodules; no carotid bruit or JVD   Back:   Symmetric, no curvature, ROM normal, no CVA tenderness   Lungs:   Clear to auscultation  bilaterally, respirations unlabored   Heart:  Regular rate and rhythm, S1 and S2 normal, no murmur, rub, or gallop   Abdomen:   Soft, non-tender, bowel sounds active all four quadrants,  no masses, no organomegaly   Pelvic: Deferred   Extremities: Extremities normal, atraumatic, no cyanosis or edema; pain with internal rotation of right shoulder   Pulses: 2+ and symmetric   Skin: Skin color, texture, turgor normal, no rashes or lesions   Lymph nodes: Cervical, supraclavicular, and axillary nodes normal   Neurologic: Normal    and Breasts:  normal appearance, no masses or tenderness    /68 (BP Location: Left arm, Patient Position: Sitting, Cuff Size: adult)   Pulse 86   Resp 18   Ht 5' 3\" (1.6 m)   Wt 133 lb (60.3 kg)   SpO2 99%   BMI 23.56 kg/m²  Estimated body mass index is 23.56 kg/m² as calculated from the following:    Height as of this encounter: 5' 3\" (1.6 m).    Weight as of this encounter: 133 lb (60.3 kg).    Medicare Hearing Assessment:   Hearing Screening    Time taken: 7/2/2024  3:28 PM  Entry User: Dina Beth MA  Screening Method: Finger Rub  Finger Rub Result: Pass         Visual Acuity:   Right Eye Visual Acuity: Corrected Right Eye Chart Acuity: 20/40   Left Eye Visual Acuity: Corrected Left Eye Chart Acuity: 20/20   Both Eyes Visual Acuity: Corrected Both Eyes Chart Acuity: 20/20   Able To Tolerate Visual Acuity: Yes        Assessment & Plan:   Jennifer Landeros is a 69 year old female who presents for a Medicare Assessment.     1. Encounter for annual health examination (Primary)  2. Gastroesophageal reflux disease, unspecified whether esophagitis present  -     Cimetidine; Take 2 tablets (600 mg total) by mouth 2 (two) times daily.  Dispense: 360 tablet; Refill: 1  -     Detailed, Mod Complex (01212)  3. Coronary artery disease involving native coronary artery of native heart without angina pectoris  -     Atorvastatin Calcium; Take 1 tablet (10 mg total) by mouth nightly.   Dispense: 90 tablet; Refill: 1  -     Detailed, Mod Complex (37834)  4. High serum high density lipoprotein (HDL)  -     Detailed, Mod Complex (61238)  5. Essential hypertension  -     Spironolactone; Take 1 tablet (25 mg total) by mouth daily.  Dispense: 90 tablet; Refill: 1  -     Detailed, Mod Complex (04770)  6. Acute pain of right shoulder  -     Physical Therapy Referral - [facility]  -     Detailed, Mod Complex (19184)  7. Acute midline low back pain without sciatica  -     Physical Therapy Referral - [facility]  -     Detailed, Mod Complex (69237)  8. Hearing loss due to cerumen impaction, right  -     Detailed, Mod Complex (62397)  9. Stress reaction  -     Detailed, Mod Complex (91548)  10. Ulcerative colitis without complications, unspecified location (HCC)  -     Detailed, Mod Complex (73542)  11. Mild intermittent asthma without complication (HCC)  -     Detailed, Mod Complex (06318)  12. Hiatal hernia  -     Detailed, Mod Complex (16956)  13. Dyslipidemia  -     Detailed, Mod Complex (17714)  14. Hyperglycemia  -     Detailed, Mod Complex (56503)  15. History of hypothyroidism  -     Detailed, Mod Complex (15073)  16. Environmental allergies  -     Detailed, Mod Complex (41121)  17. Iron deficiency anemia secondary to inadequate dietary iron intake  -     Detailed, Mod Complex (48568)  18. Vitamin D deficiency  -     Detailed, Mod Complex (31029)  19. Anxiety  -     Detailed, Mod Complex (96901)  20. Osteopenia, unspecified location  -     Detailed, Mod Complex (00004)  21. Insomnia, unspecified type  -     Detailed, Mod Complex (81698)  22. History of cold sores  -     Detailed, Mod Complex (64009)  23. Granuloma annulare  -     Detailed, Mod Complex (15458)  24. Rosacea, acne  -     Detailed, Mod Complex (10826)  25. History of herpes genitalis  -     Detailed, Mod Complex (60909)  26. Menopause  -     Detailed, Mod Complex (09998)  27. Screening mammogram for breast cancer  -     [facility] SLOAN  2D+3D SCREENING BILAT (CPT=77067/63875); Future; Expected date: 07/02/2024  -     Detailed, Mod Complex (23720)  28. Medication management  -     Atorvastatin Calcium; Take 1 tablet (10 mg total) by mouth nightly.  Dispense: 90 tablet; Refill: 1  -     Detailed, Mod Complex (73050)  29. Vaccine counseling  -     Detailed, Mod Complex (82350)    The patient indicates understanding of these issues and agrees to the plan.  Reinforced healthy diet, lifestyle, and exercise.    1. Encounter for annual health examination  Done today.    2. Gastroesophageal reflux disease, unspecified whether esophagitis present  Stable; CPM  - cimetidine 300 MG Oral Tab; Take 2 tablets (600 mg total) by mouth 2 (two) times daily.  Dispense: 360 tablet; Refill: 1  - Detailed, Mod Complex (45742)    3. Coronary artery disease involving native coronary artery of native heart without angina pectoris  Stable; CPM  - atorvastatin 10 MG Oral Tab; Take 1 tablet (10 mg total) by mouth nightly.  Dispense: 90 tablet; Refill: 1  - Detailed, Mod Complex (45718)    4. High serum high density lipoprotein (HDL)  Stable; CPM    5. Essential hypertension  Stable; CPM  - spironolactone 25 MG Oral Tab; Take 1 tablet (25 mg total) by mouth daily.  Dispense: 90 tablet; Refill: 1  - Detailed, Mod Complex (83470)    6. Acute pain of right shoulder  Advised PT and deferred xr for now since had one in 12/2022.  - Physical Therapy Referral - [facility]  - Detailed, Mod Complex (96370)    7. Acute midline low back pain without sciatica  Advised PT.  - Physical Therapy Referral - [facility]  - Detailed, Mod Complex (91311)    8. Hearing loss due to cerumen impaction, right  Advised debrox.    9. Stress reaction  Long discussion about stress in regards to worrying about her children-her son recently  with 2 young kids and her daughter trying to get pregnant via IVF.    10. Ulcerative colitis without complications, unspecified location (HCC)  Stable;  CPM    11. Mild intermittent asthma without complication (HCC)  Asthma action plan and asthma control test done today.    12. Hiatal hernia  Stable; CPM    13. Dyslipidemia  Stable; CPM    14. Hyperglycemia  Stable; CPM    15. History of hypothyroidism  Stable; CPM    16. Environmental allergies  Stable; CPM    17. Iron deficiency anemia secondary to inadequate dietary iron intake  Stable; CPM    18. Vitamin D deficiency  Stable; CPM    19. Anxiety  Stable; CPM    20. Osteopenia, unspecified location  Stable; CPM    21. Insomnia, unspecified type  May use Unisom half tablet in the evening and may repeat dose as needed in the middle the evening if needed.    22. History of cold sores  Stable; CPM    23. Granuloma annulare  Stable; CPM    24. Rosacea, acne  Stable; CPM    25. History of herpes genitalis  Stable; CPM    26. Menopause  Stable; CPM    27. Screening mammogram for breast cancer  Mammo ordered.  - [facility]HER 2D+3D SCREENING BILAT (CPT=77067/17488); Future  - Detailed, Mod Complex (45224)    28. Medication management  Reviewed.  - atorvastatin 10 MG Oral Tab; Take 1 tablet (10 mg total) by mouth nightly.  Dispense: 90 tablet; Refill: 1  - Detailed, Mod Complex (69643)    29. Vaccine counseling  Reviewed.        Return in about 3 months (around 10/2/2024) for right shoulder pain, back pain.     Lexie Alexander DO, 7/2/2024     Supplementary Documentation:   General Health:  In the past six months, have you lost more than 10 pounds without trying?: 2 - No  Has your appetite been poor?: No  Type of Diet: Balanced  How does the patient maintain a good energy level?: Appropriate Exercise;Daily Walks;Stretching  How would you describe your daily physical activity?: Moderate  How would you describe your current health state?: Good  How do you maintain positive mental well-being?: Social Interaction;Visiting Family  On a scale of 0 to 10, with 0 being no pain and 10 being severe pain, what is your pain level?: 3 -  (Mild)  In the past six months, have you experienced urine leakage?: 0-No  At any time do you feel concerned for the safety/well-being of yourself and/or your children, in your home or elsewhere?: No  Have you had any immunizations at another office such as Influenza, Hepatitis B, Tetanus, or Pneumococcal?: Yes    Health Maintenance   Topic Date Due    Colorectal Cancer Screening  08/04/2023    MA Annual Health Assessment  01/01/2024    Mammogram  06/15/2024    COVID-19 Vaccine (7 - 2023-24 season) 08/02/2024 (Originally 1/21/2024)    Influenza Vaccine (1) 10/01/2024    DEXA Scan  Completed    Annual Depression Screening  Completed    Fall Risk Screening (Annual)  Completed    Pneumococcal Vaccine: 65+ Years  Completed    Zoster Vaccines  Completed

## 2024-07-03 ENCOUNTER — TELEPHONE (OUTPATIENT)
Dept: PHYSICAL THERAPY | Facility: HOSPITAL | Age: 69
End: 2024-07-03

## 2024-07-08 ENCOUNTER — OFFICE VISIT (OUTPATIENT)
Dept: PHYSICAL THERAPY | Facility: HOSPITAL | Age: 69
End: 2024-07-08
Attending: FAMILY MEDICINE
Payer: MEDICARE

## 2024-07-08 DIAGNOSIS — M25.511 ACUTE PAIN OF RIGHT SHOULDER: Primary | ICD-10-CM

## 2024-07-08 DIAGNOSIS — M54.50 ACUTE MIDLINE LOW BACK PAIN WITHOUT SCIATICA: ICD-10-CM

## 2024-07-08 PROCEDURE — 97110 THERAPEUTIC EXERCISES: CPT

## 2024-07-08 PROCEDURE — 97162 PT EVAL MOD COMPLEX 30 MIN: CPT

## 2024-07-08 NOTE — PROGRESS NOTES
SHOULDER EVALUATION:     Diagnosis:   Acute pain of right shoulder (M25.511)  Acute midline low back pain without sciatica (M54.50)      Referring Provider: Lexie Alexander  Date of Evaluation:    2024    Precautions:  None Next MD visit:   none scheduled  Date of Surgery: n/a     PATIENT SUMMARY   Jennifer Landeros is a 69 year old female who presents to therapy today with complaints of right shoulder pain. Pt reports she was in PT last year for some shoulder arthritis last year and this helped, however over the last few months the shoulder pain has returned. Notes she has been using it more due to spring chores. Pt reports the shoulder pain is a little different than last time, now has issues reaching behind back. If she is not moving it at all there is no pain, however certain motions aggravate it. Often will wake with pain in the shoulder. Has tried doing stretches and exercises from last PT but unsure if this is helping. Pt is independent with ADLs and house chores, but can be painful at times. This pain came on after beginning to do more in the spring with cleaning and gardening, started doing exercises. Pt also has low back pain that currently is not bothering her, but would like some exercises for this during therapy.     Pt describes pain level current 0/10, at best 0/10, at worst 5-6/10.   Aggravating: reaching behind back   Relieving: no doing aggravating movements  Nature: achy   24-Hour Pattern: hurts more in the morning  How long do symptoms last? Stops pretty much immediately after moving out of position  Current functional limitations include reaching behind back is painful.     Jennifer describes prior level of function had shoulder pain last year and did PT. Pt goals include reduce pain.  Past medical history was reviewed with Jennifer. Significant findings include  has a past medical history of Anemia (2017),  delivery, without mention of indication, unspecified as to episode of  care(669.70), Elevated blood pressure reading without diagnosis of hypertension, Esophageal reflux, Palpitations, Personal history of urinary (tract) infection, Unspecified asthma(493.90), and Unspecified sinusitis (chronic).     ASSESSMENT  Jennifer presents to physical therapy evaluation with primary c/o R shoulder pain. The results of the objective tests and measures show decreased AROM of R shoulder, decreased R cervical side bending and rotation, WNL and pain free strength, positive Maddox Edil test, stiff end feel with joint mobilizations.  Functional deficits include but are not limited to unable to reach behind back without pain.  Signs and symptoms are consistent with diagnosis of right shoulder pain with possible impingement. Pt and PT discussed evaluation findings, pathology, POC and HEP.  Pt voiced understanding and performs HEP correctly without reported pain. Skilled Physical Therapy is medically necessary to address the above impairments and reach functional goals.     OBJECTIVE:   Observation/Posture: R shoulder sits slightly higher than left, slight forward rounded shoulders  Palpation: unremarkable  Sensation: SILT  Reflexes: WNL R/L   Cervical Screen: R rotation 50 deg, R side bending 10 deg, all others WNL    AROM: (* denotes performed with pain)  Shoulder  Elbow   Flexion: R 135; L WNL  Abduction: R 155; L WNL  ER: R T2; L T2  IR: R L4*; L T8 Flexion: R WNL; L WNL  Extension: R WNl; L WNL       Accessory motion:   Posterior GH glide: stiff end feel on R  Inferior GH glide: WFL R  Lateral GH glide: slight stiff end feel on R    Flexibility: increased tension to bilateral upper traps    Strength/MMT: (* denotes performed with pain)  Shoulder Elbow Wrist   Flexion: R 5/5; L 5/5  Abduction: R 5/5; L 5/5  ER: R 4+/5; L 5/5  IR: R 4+/5; L 5/5 Flexion: R 5/5; L 5/5  Extension: R 5/5; L 5/5   Wrist extension: R 4+/5; L 5/5  Wrist flexion: R 5/5; L 5/5     Special tests:   Hawkin's Edil:  positive  Drop arm: negative   Empty can/full can: negative   Biceps tendon: negative     Today’s Treatment and Response:   Pt education was provided on exam findings, treatment diagnosis, treatment plan, expectations, and prognosis. Pt was also provided recommendations for activity modifications, modalities as needed [ice/heat], postural corrections, and importance of remaining active.  Patient was instructed in and issued a HEP for:   Access Code: [email or web address]  URL: [email or web address]  Date: 07/08/2024  Prepared by: Allegra Delgado    Exercises  - Standing Shoulder Row with Anchored Resistance  - 1 x daily - 7 x weekly - 2 sets - 10 reps  - Shoulder ER Stretch in Abduction  - 1-2 x daily - 7 x weekly - 1 sets - 3 reps - 15 sec hold  - Seated Cervical Sidebending Stretch  - 1-2 x daily - 7 x weekly - 1 sets - 3 reps - 15 sec hold    Charges: PT Eval Moderate Complexity, TE 1      Total Timed Treatment: 10 min     Total Treatment Time: 40 min   TE  - x10 rows with red band, verbal and tactile cues for form  - x2 side bending stretch, verbal and tactile cues for form  - x2 single arm ER stretch in doorway  - Pt education: use of ice vs heat, continue to stay active, strategies for stress relief    Based on clinical rationale and outcome measures, this evaluation involved Moderate Complexity decision making due to 1-2 personal factors/comorbidities, 3 body structures involved/activity limitations, and evolving symptoms including changing pain levels.  PLAN OF CARE:    Goals: (to be met in 8 visits)   - Pt will have full R shoulder flexion to be able to reach into overhead cabinet without pain  - Pt will have WFL R shoulder IR to be able to complete ADL's without pain  - Pt will have WNL upper trap tension to decrease stress on shoulder.  - Pt will be independent with comprehensive HEP to maintain gains made in therapy.     Frequency / Duration: Patient will be seen for 1-2 x/week or a total of 8 visits  over a 90 day period. Treatment will include: Manual Therapy, Neuromuscular Re-education, Therapeutic Activities, Therapeutic Exercise, Home Exercise Program instruction, and Modalities to include: ice/heat    Education or treatment limitation: None  Rehab Potential:good    QuickDASH Outcome Score  Score: 18.18 % (7/5/2024  3:03 PM)      Patient/Family/Caregiver was advised of these findings, precautions, and treatment options and has agreed to actively participate in planning and for this course of care.    Thank you for your referral. Please co-sign or sign and return this letter via fax as soon as possible to [phone]. If you have any questions, please contact me at Dept: [phone]    Sincerely,  Electronically signed by therapist: Allegra Delgado PT, DPT  Physician's certification required: Yes  I certify the need for these services furnished under this plan of treatment and while under my care.    X___________________________________________________ Date____________________    Certification From: 7/8/2024  To:10/6/2024

## 2024-07-17 ENCOUNTER — OFFICE VISIT (OUTPATIENT)
Dept: PHYSICAL THERAPY | Facility: HOSPITAL | Age: 69
End: 2024-07-17
Attending: FAMILY MEDICINE
Payer: MEDICARE

## 2024-07-17 PROCEDURE — 97110 THERAPEUTIC EXERCISES: CPT

## 2024-07-17 PROCEDURE — 97140 MANUAL THERAPY 1/> REGIONS: CPT

## 2024-07-17 NOTE — PROGRESS NOTES
Diagnosis:   Acute pain of right shoulder (M25.511)  Acute midline low back pain without sciatica (M54.50)      Referring Provider: Lexie Alexander  Date of Evaluation:    7/8/2024    Precautions:  None Next MD visit:   none scheduled  Date of Surgery: n/a   Insurance Primary/Secondary: [employer] [facility] / N/A     # Auth Visits: 8            Subjective: Pt reports no changes since last session. Has been doing exercises but would like to review.     Pain: stiffness      Objective:   Cervical Screen: R rotation 50 deg, R side bending 10 deg, all others WNL     AROM: (* denotes performed with pain)  Shoulder  Elbow   Flexion: R 135; L WNL  Abduction: R 155; L WNL  ER: R T2; L T2  IR: R L4*; L T8 Flexion: R WNL; L WNL  Extension: R WNl; L WNL         Accessory motion:   Posterior GH glide: stiff end feel on R  Inferior GH glide: WFL R  Lateral GH glide: slight stiff end feel on R     Flexibility: increased tension to bilateral upper traps     Strength/MMT: (* denotes performed with pain)  Shoulder Elbow Wrist   Flexion: R 5/5; L 5/5  Abduction: R 5/5; L 5/5  ER: R 4+/5; L 5/5  IR: R 4+/5; L 5/5 Flexion: R 5/5; L 5/5  Extension: R 5/5; L 5/5    Wrist extension: R 4+/5; L 5/5  Wrist flexion: R 5/5; L 5/5      Special tests:   Hawkin's Edil: positive  Drop arm: negative   Empty can/full can: negative   Biceps tendon: negative       Assessment: Pt attends first follow up visit after initial evaluation. Reviewed HEP, verbal and tactile cues provided to improve form and muscle recruitment with standing rows. Manual intervention provided to improve R shoulder GH mechanics, no pain with gr III-IV mobilizations, pt noted to have tightness in bilateral upper traps R>L, Pt able to complete AAROM activities in session with no pain, cues provided throughout to decreased upper trap recruitment and for scapular setting. Pt educated at end of session of use of ice or heat for pain relief and safety with other exercises at home.       Goals:    (to be met in 8 visits)   - Pt will have full R shoulder flexion to be able to reach into overhead cabinet without pain  - Pt will have WFL R shoulder IR to be able to complete ADL's without pain  - Pt will have WNL upper trap tension to decrease stress on shoulder.  - Pt will be independent with comprehensive HEP to maintain gains made in therapy.     Plan: Continue per plan of care.   Date: 7/17/2024  TX#: 2/8 Date:                 TX#: 3/ Date:                 TX#: 4/ Date:                 TX#: 5/ Date:   Tx#: 6/   Therex: 23min  Neck side bending stretch, 3x15 sec R/L  Single arm shoulder ER stretch RUE, 3x15 sec   Pulley's in flexion, 2x1min  Standing rows with red band, 2x10   Shoulder ext with griselda, horizontal  add/abduction, 2x10   Swiss ball rolls at wall, shoulder flexion, 2x10   Bicep curls 5#, 2x10   Lateral wall walk with yellow band, 2x10          Manual: 15min  Posterior GH glide, gr III-IV, 3x10  Inferior GH glide, gr III-IV, 2x10  Lateral GH glide, gr III-IV, 2x10  STM to bilat upper traps                     HEP:   Access Code: [email or web address]  URL: [email or web address]  Date: 07/08/2024  Prepared by: Allegra Delgado     Exercises  - Standing Shoulder Row with Anchored Resistance  - 1 x daily - 7 x weekly - 2 sets - 10 reps  - Shoulder ER Stretch in Abduction  - 1-2 x daily - 7 x weekly - 1 sets - 3 reps - 15 sec hold  - Seated Cervical Sidebending Stretch  - 1-2 x daily - 7 x weekly - 1 sets - 3 reps - 15 sec hold    Charges: TE 2 Manual 1       Total Timed Treatment: 38 min  Total Treatment Time: 38 min

## 2024-07-24 ENCOUNTER — OFFICE VISIT (OUTPATIENT)
Dept: PHYSICAL THERAPY | Facility: HOSPITAL | Age: 69
End: 2024-07-24
Attending: FAMILY MEDICINE
Payer: MEDICARE

## 2024-07-24 PROCEDURE — 97110 THERAPEUTIC EXERCISES: CPT

## 2024-07-24 PROCEDURE — 97140 MANUAL THERAPY 1/> REGIONS: CPT

## 2024-07-24 NOTE — PROGRESS NOTES
Diagnosis:   Acute pain of right shoulder (M25.511)  Acute midline low back pain without sciatica (M54.50)      Referring Provider: Lexie Alexander  Date of Evaluation:    7/8/2024    Precautions:  None Next MD visit:   none scheduled  Date of Surgery: n/a   Insurance Primary/Secondary: [employer] [facility] / N/A     # Auth Visits: 8            Subjective: Pt reports after last session she felt really good for about a day. However following, it got stiff again.     Pain: \"tightness\"       Objective:   Cervical Screen: R rotation 50 deg, R side bending 10 deg, all others WNL     AROM: (* denotes performed with pain)  Shoulder  Elbow   Flexion: R 135; L WNL  Abduction: R 155; L WNL  ER: R T2; L T2  IR: R L4*; L T8 Flexion: R WNL; L WNL  Extension: R WNl; L WNL         Accessory motion:   Posterior GH glide: stiff end feel on R  Inferior GH glide: WFL R  Lateral GH glide: slight stiff end feel on R     Flexibility: increased tension to bilateral upper traps     Strength/MMT: (* denotes performed with pain)  Shoulder Elbow Wrist   Flexion: R 5/5; L 5/5  Abduction: R 5/5; L 5/5  ER: R 4+/5; L 5/5  IR: R 4+/5; L 5/5 Flexion: R 5/5; L 5/5  Extension: R 5/5; L 5/5    Wrist extension: R 4+/5; L 5/5  Wrist flexion: R 5/5; L 5/5      Special tests:   Hawkin's Edil: positive  Drop arm: negative   Empty can/full can: negative   Biceps tendon: negative     7/24/2024  - With pulley's in flexion/abduction, able to achieve 80% of full ROM without pain  - Increased tension throughout upper trap muscle more palpable on R vs L      Assessment: Pt has good response to manual and exercise intervention in therapy session today. Following manual intervention, focused on AAROM exercises to improve shoulder range of motion in pain free range. Pt cued throughout to decrease upper trap involvement for improved form. Pt education provided on use of massage gun or thera cane to work through upper trap muscle tension, pt will attempt this before next  session. Exercises also provided for low back pain, pt is not experiencing currently but would like some stretches to address this. Continue per plan of care.       Goals:   (to be met in 8 visits)   - Pt will have full R shoulder flexion to be able to reach into overhead cabinet without pain.   - Pt will have WFL R shoulder IR to be able to complete ADL's without pain.   - Pt will have WNL upper trap tension to decrease stress on shoulder.  - Pt will be independent with comprehensive HEP to maintain gains made in therapy.     Plan: Continue per plan of care.   Date: 7/17/2024  TX#: 2/8 Date: 7/24/2024              TX#: 3/8 Date:                 TX#: 4/ Date:                 TX#: 5/ Date:   Tx#: 6/   Therex: 23min  Neck side bending stretch, 3x15 sec R/L  Single arm shoulder ER stretch RUE, 3x15 sec   Pulley's in flexion, 2x1min  Standing rows with red band, 2x10   Shoulder ext with griselda, horizontal  add/abduction, 2x10   Swiss ball rolls at wall, shoulder flexion, 2x10   Bicep curls 5#, 2x10   Lateral wall walk with yellow band, 2x10    Therex: 25 min  Pulley's 2 min flexion and 2 min abduction  Swiss ball rolls at wall, shoulder flexion, x15  Standing rows with red band, 3x10   Shoulder ext with griselda, horizontal  add/abduction, 2x10   BHB IR stretch with griselda, x20   Open book, x20 R/L   Lateral wall walk with yellow band, 2x10         Manual: 15min  Posterior GH glide, gr III-IV, 3x10  Inferior GH glide, gr III-IV, 2x10  Lateral GH glide, gr III-IV, 2x10  STM to bilat upper traps Manual: 15min  Posterior GH glide, gr III-IV, 3x10  Inferior GH glide, gr III-IV, 2x10  Lateral GH glide, gr III-IV, 2x10  STM to bilat upper traps                    HEP:   Access Code: [email or web address]  URL: [email or web address]  Date: 07/08/2024  Prepared by: Allegra Delgado     Exercises  - Standing Shoulder Row with Anchored Resistance  - 1 x daily - 7 x weekly - 2 sets - 10 reps  - Shoulder ER Stretch in Abduction  - 1-2 x  daily - 7 x weekly - 1 sets - 3 reps - 15 sec hold  - Seated Cervical Sidebending Stretch  - 1-2 x daily - 7 x weekly - 1 sets - 3 reps - 15 sec hold    Access Code: [email or web address]  URL: [email or web address]  Date: 07/24/2024  Prepared by: Allegra Delgado    Exercises  - Supine Lower Trunk Rotation  - 1 x daily - 7 x weekly - 3 sets - 10 reps  - Supine Bridge  - 1 x daily - 7 x weekly - 2 sets - 10 reps    Charges: TE 2 Manual 1       Total Timed Treatment: 40 min  Total Treatment Time: 40 min

## 2024-07-30 ENCOUNTER — APPOINTMENT (OUTPATIENT)
Dept: PHYSICAL THERAPY | Facility: HOSPITAL | Age: 69
End: 2024-07-30
Attending: FAMILY MEDICINE
Payer: MEDICARE

## 2024-08-02 ENCOUNTER — OFFICE VISIT (OUTPATIENT)
Dept: PHYSICAL THERAPY | Facility: HOSPITAL | Age: 69
End: 2024-08-02
Attending: FAMILY MEDICINE
Payer: MEDICARE

## 2024-08-02 PROCEDURE — 97140 MANUAL THERAPY 1/> REGIONS: CPT

## 2024-08-02 PROCEDURE — 97110 THERAPEUTIC EXERCISES: CPT

## 2024-08-02 NOTE — PROGRESS NOTES
Diagnosis:   Acute pain of right shoulder (M25.511)  Acute midline low back pain without sciatica (M54.50)      Referring Provider: Lexie Alexander  Date of Evaluation:    7/8/2024    Precautions:  None Next MD visit:   none scheduled  Date of Surgery: n/a   Insurance Primary/Secondary: [employer]ETNA [facility] / N/A     # Auth Visits: 8            Subjective: Pt feels like the shoulder is starting to feel looser. Got a thera cane to help massage upper traps.     Pain: \"tightness\"      Objective:   Cervical Screen: R rotation 50 deg, R side bending 10 deg, all others WNL     AROM: (* denotes performed with pain)  Shoulder  Elbow   Flexion: R 135; L WNL  Abduction: R 155; L WNL  ER: R T2; L T2  IR: R L4*; L T8 Flexion: R WNL; L WNL  Extension: R WNl; L WNL         Accessory motion:   Posterior GH glide: stiff end feel on R  Inferior GH glide: WFL R  Lateral GH glide: slight stiff end feel on R     Flexibility: increased tension to bilateral upper traps     Strength/MMT: (* denotes performed with pain)  Shoulder Elbow Wrist   Flexion: R 5/5; L 5/5  Abduction: R 5/5; L 5/5  ER: R 4+/5; L 5/5  IR: R 4+/5; L 5/5 Flexion: R 5/5; L 5/5  Extension: R 5/5; L 5/5    Wrist extension: R 4+/5; L 5/5  Wrist flexion: R 5/5; L 5/5      Special tests:   Hawkin's Edil: positive  Drop arm: negative   Empty can/full can: negative   Biceps tendon: negative     7/24/2024  - With pulley's in flexion/abduction, able to achieve 80% of full ROM without pain  - Increased tension throughout upper trap muscle more palpable on R vs L    8/2/2024  - Flexion: 146 deg  - Abduction: 155 deg      Assessment: Pt makes gradual progress towards goals for physical therapy. When remeasured today, shoulder flexion has improved by 10 deg since initial eval. Upper trap tension is also improving bilaterally. Internal rotation continues to be most limited motion. Pt able to complete isometric rotator cuff strengthening with band walk outs with no reports of pain. Updated  rows at home to green band. Continue per plan of care.       Goals:   (to be met in 8 visits)   - Pt will have full R shoulder flexion to be able to reach into overhead cabinet without pain. Progressing, 8/2/2024  - Pt will have WFL R shoulder IR to be able to complete ADL's without pain. Progressing, 8/2/2024  - Pt will have WNL upper trap tension to decrease stress on shoulder.Progressing, 8/2/2024  - Pt will be independent with comprehensive HEP to maintain gains made in therapy. Progressing, 8/2/2024    Plan: Continue per plan of care. Next session: subscapularis palpation/release  Date: 7/17/2024  TX#: 2/8 Date: 7/24/2024              TX#: 3/8 Date: 8/2/2024              TX#: 4/8 Date:                 TX#: 5/ Date:   Tx#: 6/   Therex: 23min  Neck side bending stretch, 3x15 sec R/L  Single arm shoulder ER stretch RUE, 3x15 sec   Pulley's in flexion, 2x1min  Standing rows with red band, 2x10   Shoulder ext with griselda, horizontal  add/abduction, 2x10   Swiss ball rolls at wall, shoulder flexion, 2x10   Bicep curls 5#, 2x10   Lateral wall walk with yellow band, 2x10    Therex: 25 min  Pulley's 2 min flexion and 2 min abduction  Swiss ball rolls at wall, shoulder flexion, x15  Standing rows with red band, 3x10   Shoulder ext with griselda, horizontal  add/abduction, 2x10   BHB IR stretch with griselda, x20   Open book, x20 R/L   Lateral wall walk with yellow band, 2x10    Therex: 30 min  Pulley's 2 min flexion and 2 min abduction  Open book, x20 R/L  Standing rows with green band, 3x10   Shoulder ext with griselda, horizontal  add/abduction, 2x10   BHB IR stretch with griselda, x20   Seated lat stretch, 3x20 sec  ER/IR band walk out, yellow,x10 each      Manual: 15min  Posterior GH glide, gr III-IV, 3x10  Inferior GH glide, gr III-IV, 2x10  Lateral GH glide, gr III-IV, 2x10  STM to bilat upper traps Manual: 15min  Posterior GH glide, gr III-IV, 3x10  Inferior GH glide, gr III-IV, 2x10  Lateral GH glide, gr III-IV, 2x10  STM to bilat  upper traps Manual: 15min  Posterior GH glide, gr III-IV, 3x10  Inferior GH glide, gr III-IV, 2x10  Lateral GH glide, gr III-IV, x10  STM to bilat upper traps  BHB long axis distraction for shoulder IR, x10                    HEP:   Access Code: [email or web address]  URL: [email or web address]  Date: 07/08/2024  Prepared by: Allegra Delgado     Exercises  - Standing Shoulder Row with Anchored Resistance  - 1 x daily - 7 x weekly - 2 sets - 10 reps  - Shoulder ER Stretch in Abduction  - 1-2 x daily - 7 x weekly - 1 sets - 3 reps - 15 sec hold  - Seated Cervical Sidebending Stretch  - 1-2 x daily - 7 x weekly - 1 sets - 3 reps - 15 sec hold    Access Code: [email or web address]  URL: [email or web address]  Date: 07/24/2024  Prepared by: Allegra Danny    Exercises  - Supine Lower Trunk Rotation  - 1 x daily - 7 x weekly - 3 sets - 10 reps  - Supine Bridge  - 1 x daily - 7 x weekly - 2 sets - 10 reps    Charges: TE 2 Manual 1       Total Timed Treatment: 45 min  Total Treatment Time: 45 min

## 2024-08-07 ENCOUNTER — OFFICE VISIT (OUTPATIENT)
Dept: PHYSICAL THERAPY | Facility: HOSPITAL | Age: 69
End: 2024-08-07
Attending: FAMILY MEDICINE
Payer: MEDICARE

## 2024-08-07 ENCOUNTER — PATIENT MESSAGE (OUTPATIENT)
Dept: FAMILY MEDICINE CLINIC | Facility: CLINIC | Age: 69
End: 2024-08-07

## 2024-08-07 PROCEDURE — 97110 THERAPEUTIC EXERCISES: CPT

## 2024-08-07 PROCEDURE — 97140 MANUAL THERAPY 1/> REGIONS: CPT

## 2024-08-07 NOTE — PROGRESS NOTES
Diagnosis:   Acute pain of right shoulder (M25.511)  Acute midline low back pain without sciatica (M54.50)      Referring Provider: Lexie Alexander  Date of Evaluation:    7/8/2024    Precautions:  None Next MD visit:   none scheduled  Date of Surgery: n/a   Insurance Primary/Secondary: [employer] [facility] / N/A     # Auth Visits: 8            Subjective: Pt feels like shoulder is gradually getting better. Has been doing online exercise classes (without weights) so shoulder is moving more.     Pain: 0/10      Objective:   Cervical Screen: R rotation 50 deg, R side bending 10 deg, all others WNL     AROM: (* denotes performed with pain)  Shoulder  Elbow   Flexion: R 135; L WNL  Abduction: R 155; L WNL  ER: R T2; L T2  IR: R L4*; L T8 Flexion: R WNL; L WNL  Extension: R WNl; L WNL         Accessory motion:   Posterior GH glide: stiff end feel on R  Inferior GH glide: WFL R  Lateral GH glide: slight stiff end feel on R     Flexibility: increased tension to bilateral upper traps     Strength/MMT: (* denotes performed with pain)  Shoulder Elbow Wrist   Flexion: R 5/5; L 5/5  Abduction: R 5/5; L 5/5  ER: R 4+/5; L 5/5  IR: R 4+/5; L 5/5 Flexion: R 5/5; L 5/5  Extension: R 5/5; L 5/5    Wrist extension: R 4+/5; L 5/5  Wrist flexion: R 5/5; L 5/5      Special tests:   Hawkin's Edil: positive  Drop arm: negative   Empty can/full can: negative   Biceps tendon: negative     7/24/2024  - With pulley's in flexion/abduction, able to achieve 80% of full ROM without pain  - Increased tension throughout upper trap muscle more palpable on R vs L    8/2/2024  - Flexion: 146 deg  - Abduction: 155 deg      Assessment: Continued limitations noted in RUE internal rotation. Palpated subscapularis muscle today, increased tension and tenderness noted, completed STM and release to muscle to help improve shoulder IR. Followed up with sleeper stretch, also provided instruction for pt to complete at home. Continued with isometric rotator cuff  strengthening with band walk outs, pt able to complete with min cues for form, no pain with activity. Continue per plan of care to achieve goals.       Goals:   (to be met in 8 visits)   - Pt will have full R shoulder flexion to be able to reach into overhead cabinet without pain. Progressing, 8/2/2024  - Pt will have WFL R shoulder IR to be able to complete ADL's without pain. Progressing, 8/2/2024  - Pt will have WNL upper trap tension to decrease stress on shoulder.Progressing, 8/2/2024  - Pt will be independent with comprehensive HEP to maintain gains made in therapy. Progressing, 8/2/2024    Plan: Continue per plan of care.   Date: 7/17/2024  TX#: 2/8 Date: 7/24/2024              TX#: 3/8 Date: 8/2/2024              TX#: 4/8 Date:8/7/2024                 TX#: 5/8 Date:   Tx#: 6/   Therex: 23min  Neck side bending stretch, 3x15 sec R/L  Single arm shoulder ER stretch RUE, 3x15 sec   Pulley's in flexion, 2x1min  Standing rows with red band, 2x10   Shoulder ext with griselda, horizontal  add/abduction, 2x10   Swiss ball rolls at wall, shoulder flexion, 2x10   Bicep curls 5#, 2x10   Lateral wall walk with yellow band, 2x10    Therex: 25 min  Pulley's 2 min flexion and 2 min abduction  Swiss ball rolls at wall, shoulder flexion, x15  Standing rows with red band, 3x10   Shoulder ext with griselda, horizontal  add/abduction, 2x10   BHB IR stretch with griselda, x20   Open book, x20 R/L   Lateral wall walk with yellow band, 2x10    Therex: 30 min  Pulley's 2 min flexion and 2 min abduction  Open book, x20 R/L  Standing rows with green band, 3x10   Shoulder ext with griselda, horizontal  add/abduction, 2x10   BHB IR stretch with griselda, x20   Seated lat stretch, 3x20 sec  ER/IR band walk out, yellow, x10 each  Therex: 25 min  Pulley's 2 min flexion and 2 min abduction  Sleeper stretch, 5x15 sec   Open book, x20 R/L  Standing L stretch, 4x15 sec  Standing rows with green band, 3x10   ER/IR band walk out, yellow, 2x10 each   IR stretch with  pulley's, 2x1 min    Manual: 15min  Posterior GH glide, gr III-IV, 3x10  Inferior GH glide, gr III-IV, 2x10  Lateral GH glide, gr III-IV, 2x10  STM to bilat upper traps Manual: 15min  Posterior GH glide, gr III-IV, 3x10  Inferior GH glide, gr III-IV, 2x10  Lateral GH glide, gr III-IV, 2x10  STM to bilat upper traps Manual: 15min  Posterior GH glide, gr III-IV, 3x10  Inferior GH glide, gr III-IV, 2x10  Lateral GH glide, gr III-IV, x10  STM to bilat upper traps  BHB long axis distraction for shoulder IR, x10  Manual: 15min  Posterior GH glide, gr III-IV, 2x10  Lateral GH glide, gr III-IV, 2x10  STM to bilat upper traps  STM and release to subscapularis muscle on the R  BHB long axis distraction for shoulder IR, 2x10                   HEP:   Access Code: [email or web address]  URL: [email or web address]  Date: 08/07/2024  Prepared by: Allegra Delgado    Exercises  - Standing Shoulder Row with Anchored Resistance  - 1 x daily - 7 x weekly - 2 sets - 10 reps  - Shoulder ER Stretch in Abduction  - 1-2 x daily - 7 x weekly - 1 sets - 3 reps - 15 sec hold  - Seated Cervical Sidebending Stretch  - 1-2 x daily - 7 x weekly - 1 sets - 3 reps - 15 sec hold  - Sleeper Stretch  - 1 x daily - 7 x weekly - 1 sets - 5 reps - 15 hold  - Standing 'L' Stretch at Counter  - 1 x daily - 7 x weekly - 1 sets - 5 reps - 15 sec hold    Access Code: [email or web address]  URL: [email or web address]  Date: 07/24/2024  Prepared by: Allegra Hedin    Exercises  - Supine Lower Trunk Rotation  - 1 x daily - 7 x weekly - 3 sets - 10 reps  - Supine Bridge  - 1 x daily - 7 x weekly - 2 sets - 10 reps    Charges: TE 2 Manual 1       Total Timed Treatment: 40 min  Total Treatment Time: 40 min

## 2024-08-08 NOTE — TELEPHONE ENCOUNTER
From: Jennifer Landeros  To: Lexie Alexander  Sent: 8/7/2024 5:50 PM CDT  Subject: Therapist     You had mentioned there is a new therapist in your office. Can you send me her name and information. I think my daughter may want to see her. I don’t see her information on my chart. Thank you

## 2024-08-14 ENCOUNTER — TELEPHONE (OUTPATIENT)
Dept: FAMILY MEDICINE CLINIC | Facility: CLINIC | Age: 69
End: 2024-08-14

## 2024-08-14 ENCOUNTER — OFFICE VISIT (OUTPATIENT)
Dept: PHYSICAL THERAPY | Facility: HOSPITAL | Age: 69
End: 2024-08-14
Attending: FAMILY MEDICINE
Payer: MEDICARE

## 2024-08-14 DIAGNOSIS — M25.511 RIGHT SHOULDER PAIN, UNSPECIFIED CHRONICITY: Primary | ICD-10-CM

## 2024-08-14 PROCEDURE — 97140 MANUAL THERAPY 1/> REGIONS: CPT

## 2024-08-14 PROCEDURE — 97110 THERAPEUTIC EXERCISES: CPT

## 2024-08-14 NOTE — TELEPHONE ENCOUNTER
Call pt. And see if she would like to see ortho for her right shoulder.  PT indicated a concern for frozen shoulder.  If so, to Dr. Dick for eval.  Thanks.

## 2024-08-14 NOTE — PROGRESS NOTES
Diagnosis:   Acute pain of right shoulder (M25.511)  Acute midline low back pain without sciatica (M54.50)      Referring Provider: Lexie Alexander  Date of Evaluation:    7/8/2024    Precautions:  None Next MD visit:   none scheduled  Date of Surgery: n/a   Insurance Primary/Secondary: AETSELVIN [facility] / N/A     # Auth Visits: 8           Progress Summary  Pt has attended 6 visits in Physical Therapy.      Subjective: Pt continues to report pain with movements, but no pain at rest. Reaching behind back is still the hardest. Feels like it is a little more sore from exercises at home.     Pain: no pain at rest      Objective:   Cervical Screen: R rotation 50 deg, R side bending 10 deg, all others WNL     AROM: (* denotes performed with pain)  Shoulder Initial Eval Shoulder 8/14 Elbow   Flexion: R 135; L WNL  Abduction: R 155; L WNL  ER: R T2; L T2  IR: R L4*; L T8 Flexion: R 150 deg  Abduction: R 155 deg   ER: R T2  IR: R L4* Flexion: R WNL; L WNL  Extension: R WNl; L WNL         Accessory motion:   Posterior GH glide: stiff end feel on R  Inferior GH glide: WFL R  Lateral GH glide: slight stiff end feel on R     Flexibility: increased tension to bilateral upper traps     Strength/MMT: (* denotes performed with pain)  Shoulder Elbow Wrist   Flexion: R 5/5; L 5/5  Abduction: R 5/5; L 5/5  ER: R 5/5; L 5/5  IR: R 5/5; L 5/5 Flexion: R 5/5; L 5/5  Extension: R 5/5; L 5/5    Wrist extension: R 4+/5; L 5/5  Wrist flexion: R 5/5; L 5/5      Special tests:   Hawkin's Edil: negative  Neer's negative  Drop arm: negative   Load and shift: negative  Biceps load II: negative   Bear hug: negative   Empty can/full can: negative   Biceps tendon: negative         Assessment: Pt has attended 6 visits in outpatient physical therapy. In this time, pt has been able to reduce symptoms of tendonitis. However, pt is still experiencing great limitations in shoulder IR on the L side with little improvements since initial evaluation. All shoulder  special tests are negative at today's visit. Pt continues to have shoulder IR limited to L4 for behind back reaching and painful motion. Pt has been consistent with stretches at home. Recommended pt follow up with PCP for potential referral to orthopedic specialist for further assessment for continued shoulder issues.       Goals:   (to be met in 8 visits)   - Pt will have full R shoulder flexion to be able to reach into overhead cabinet without pain. Progressing, 8/2/2024  - Pt will have WFL R shoulder IR to be able to complete ADL's without pain. Progressing, 8/2/2024  - Pt will have WNL upper trap tension to decrease stress on shoulder.Progressing, 8/2/2024  - Pt will be independent with comprehensive HEP to maintain gains made in therapy. Progressing, 8/2/2024    Plan: Continue skilled Physical Therapy 1 x/week or a total of 4 visits over a 90 day period.        Patient/Family/Caregiver was advised of these findings, precautions, and treatment options and has agreed to actively participate in planning and for this course of care.    Thank you for your referral. If you have any questions, please contact me at Dept: [phone].    Sincerely,  Electronically signed by therapist: Allegra Delgado PT, DPT    Certification From: 8/14/2024  To:11/12/2024    Date: 7/17/2024  TX#: 2/8 Date: 7/24/2024              TX#: 3/8 Date: 8/2/2024              TX#: 4/8 Date:8/7/2024                 TX#: 5/8 Date: 8/14/2024  Tx#: 6/8   Therex: 23min  Neck side bending stretch, 3x15 sec R/L  Single arm shoulder ER stretch RUE, 3x15 sec   Pulley's in flexion, 2x1min  Standing rows with red band, 2x10   Shoulder ext with griselda, horizontal  add/abduction, 2x10   Swiss ball rolls at wall, shoulder flexion, 2x10   Bicep curls 5#, 2x10   Lateral wall walk with yellow band, 2x10    Therex: 25 min  Pulley's 2 min flexion and 2 min abduction  Swiss ball rolls at wall, shoulder flexion, x15  Standing rows with red band, 3x10   Shoulder ext with  griselda, horizontal  add/abduction, 2x10   BHB IR stretch with griselda, x20   Open book, x20 R/L   Lateral wall walk with yellow band, 2x10    Therex: 30 min  Pulley's 2 min flexion and 2 min abduction  Open book, x20 R/L  Standing rows with green band, 3x10   Shoulder ext with griselda, horizontal  add/abduction, 2x10   BHB IR stretch with griselda, x20   Seated lat stretch, 3x20 sec  ER/IR band walk out, yellow, x10 each  Therex: 25 min  Pulley's 2 min flexion and 2 min abduction  Sleeper stretch, 5x15 sec   Open book, x20 R/L  Standing L stretch, 4x15 sec  Standing rows with green band, 3x10   ER/IR band walk out, yellow, 2x10 each   IR stretch with pulley's, 2x1 min Therex: 25 min  UE bike, 2'/2' fwd/bkwd  Reassessment above in objective  ER/IR band walk out, red, 2x10 each R/L    Resisted shoulder flexion, red band, 2x10 R/L   Wall push ups, 2x10    Manual: 15min  Posterior GH glide, gr III-IV, 3x10  Inferior GH glide, gr III-IV, 2x10  Lateral GH glide, gr III-IV, 2x10  STM to bilat upper traps Manual: 15min  Posterior GH glide, gr III-IV, 3x10  Inferior GH glide, gr III-IV, 2x10  Lateral GH glide, gr III-IV, 2x10  STM to bilat upper traps Manual: 15min  Posterior GH glide, gr III-IV, 3x10  Inferior GH glide, gr III-IV, 2x10  Lateral GH glide, gr III-IV, x10  STM to bilat upper traps  BHB long axis distraction for shoulder IR, x10  Manual: 15min  Posterior GH glide, gr III-IV, 2x10  Lateral GH glide, gr III-IV, 2x10  STM to bilat upper traps  STM and release to subscapularis muscle on the R  BHB long axis distraction for shoulder IR, 2x10  Manual: 20 min  Manual: 15min  Posterior GH glide, gr III-IV, 2x10  Lateral GH glide, gr III-IV, 2x10  STM and release to subscapularis muscle on the R  PROM shoulder all directions                  HEP:   Access Code: [email or web address]  URL: [email or web address]  Date: 08/07/2024  Prepared by: Allegra Delgado    Exercises  - Standing Shoulder Row with Anchored Resistance  - 1 x daily  - 7 x weekly - 2 sets - 10 reps  - Shoulder ER Stretch in Abduction  - 1-2 x daily - 7 x weekly - 1 sets - 3 reps - 15 sec hold  - Seated Cervical Sidebending Stretch  - 1-2 x daily - 7 x weekly - 1 sets - 3 reps - 15 sec hold  - Sleeper Stretch  - 1 x daily - 7 x weekly - 1 sets - 5 reps - 15 hold  - Standing 'L' Stretch at Counter  - 1 x daily - 7 x weekly - 1 sets - 5 reps - 15 sec hold    Access Code: [email or web address]  URL: [email or web address]  Date: 07/24/2024  Prepared by: Allegra Delgado    Exercises  - Supine Lower Trunk Rotation  - 1 x daily - 7 x weekly - 3 sets - 10 reps  - Supine Bridge  - 1 x daily - 7 x weekly - 2 sets - 10 reps    Charges: TE 2 Manual 1       Total Timed Treatment: 45 min  Total Treatment Time: 45 min

## 2024-08-15 ENCOUNTER — TELEPHONE (OUTPATIENT)
Dept: ORTHOPEDICS CLINIC | Facility: CLINIC | Age: 69
End: 2024-08-15

## 2024-08-15 DIAGNOSIS — M25.511 RIGHT SHOULDER PAIN, UNSPECIFIED CHRONICITY: Primary | ICD-10-CM

## 2024-08-15 NOTE — TELEPHONE ENCOUNTER
X-Ray ordered. Please schedule with the patient. Thank you    Future Appointments   Date Time Provider Department Center   8/16/2024 11:20 AM [facility] RM1 [facility] [place]   8/21/2024  8:30 AM [facility]   8/30/2024  1:15 PM [facility]edin [facility]   9/6/2024  1:15 [facility]   9/13/2024 10:00 AM [facility]   9/16/2024  9:20 AM [id number]   10/8/2024  9:00 AM Lexie Alexander DO EMG 11 EMG [place]

## 2024-08-15 NOTE — TELEPHONE ENCOUNTER
Patient is seeing Dr. Dick for right shoulder pain. Please advie if imaging is needed.  Future Appointments   Date Time Provider Department Center   8/16/2024 11:20 AM [facility] RM1 HOB MAMMO [place]   8/21/2024  8:30 AM [facility]   8/30/2024  [facility]   9/6/2024  1:15 PM [facility] Delgado [facility]   9/13/2024 10:00 AM [facility] Delgado [facility]   9/16/2024  9:20 AM [id number]   10/8/2024  9:00 AM Lexie Alexander DO EMG 11 EMG [place]

## 2024-08-16 ENCOUNTER — HOSPITAL ENCOUNTER (OUTPATIENT)
Dept: MAMMOGRAPHY | Age: 69
Discharge: HOME | End: 2024-08-16
Attending: FAMILY MEDICINE
Payer: MEDICARE

## 2024-08-16 DIAGNOSIS — Z12.31 SCREENING MAMMOGRAM FOR BREAST CANCER: ICD-10-CM

## 2024-08-16 PROCEDURE — 77063 BREAST TOMOSYNTHESIS BI: CPT | Performed by: FAMILY MEDICINE

## 2024-08-16 PROCEDURE — 77067 SCR MAMMO BI INCL CAD: CPT | Performed by: FAMILY MEDICINE

## 2024-08-21 ENCOUNTER — OFFICE VISIT (OUTPATIENT)
Dept: PHYSICAL THERAPY | Facility: HOSPITAL | Age: 69
End: 2024-08-21
Attending: FAMILY MEDICINE
Payer: MEDICARE

## 2024-08-21 PROCEDURE — 97140 MANUAL THERAPY 1/> REGIONS: CPT

## 2024-08-21 PROCEDURE — 97110 THERAPEUTIC EXERCISES: CPT

## 2024-08-21 NOTE — PROGRESS NOTES
Diagnosis:   Acute pain of right shoulder (M25.511)  Acute midline low back pain without sciatica (M54.50)      Referring Provider: Lexie Alexander  Date of Evaluation:    7/8/2024    Precautions:  None Next MD visit:   none scheduled  Date of Surgery: n/a   Insurance Primary/Secondary: AETNA [facility] / N/A     # Auth Visits: 10           Subjective: Pt reports that shoulder is feeling a little bit better this week, less pain and more just stiffness. Will be getting x-ray tomorrow and following up with Dr. Dick next week for continued shoulder pain.      Pain: no pain at rest      Objective:   8/14/2024 - Reassessment   Cervical Screen: R rotation 50 deg, R side bending 10 deg, all others WNL     AROM: (* denotes performed with pain)  Shoulder Initial Eval Shoulder 8/14 Elbow   Flexion: R 135; L WNL  Abduction: R 155; L WNL  ER: R T2; L T2  IR: R L4*; L T8 Flexion: R 150 deg  Abduction: R 155 deg   ER: R T2  IR: R L4* Flexion: R WNL; L WNL  Extension: R WNl; L WNL         Accessory motion:   Posterior GH glide: stiff end feel on R  Inferior GH glide: WFL R  Lateral GH glide: slight stiff end feel on R     Flexibility: increased tension to bilateral upper traps     Strength/MMT: (* denotes performed with pain)  Shoulder Elbow Wrist   Flexion: R 5/5; L 5/5  Abduction: R 5/5; L 5/5  ER: R 5/5; L 5/5  IR: R 5/5; L 5/5 Flexion: R 5/5; L 5/5  Extension: R 5/5; L 5/5    Wrist extension: R 4+/5; L 5/5  Wrist flexion: R 5/5; L 5/5      Special tests:   Hawkin's Edil: negative  Neer's negative  Drop arm: negative   Load and shift: negative  Biceps load II: negative   Bear hug: negative   Empty can/full can: negative   Biceps tendon: negative     Assessment: Pt has decreased pain today with PROM of shoulder, most discomfort continues to be with passive abduction. Flexion, scaption and IR/ER are all not painful with PROM and end range stretch. Progressed to full IR/ER for resisted band exercise and reports no pain with activity.  Continued with other strength training as tolerated. Pt continues to be most limited in active IR, only able to get to L4 segment with R arm. Pt would benefit from continued skilled intervention to improve range of motion and strength, in addition to examination from orthopedic specialist, Dr. Dick next week.       Goals:   (to be met in 8 visits)   - Pt will have full R shoulder flexion to be able to reach into overhead cabinet without pain. Progressing, 8/21/2024  - Pt will have WFL R shoulder IR to be able to complete ADL's without pain. Progressing, 8/21/2024  - Pt will have WNL upper trap tension to decrease stress on shoulder.Progressing, 8/21/2024  - Pt will be independent with comprehensive HEP to maintain gains made in therapy. Progressing, 8/21/2024    Plan: Continue per plan of care.     Certification From: 8/14/2024  To:11/12/2024    Date: 7/17/2024  TX#: 2/8 Date: 7/24/2024              TX#: 3/8 Date: 8/2/2024              TX#: 4/8 Date:8/7/2024                 TX#: 5/8 Date: 8/14/2024  Tx#: 6/8 Date: 8/21/2024  Tx#: 7/10   Therex: 23min  Neck side bending stretch, 3x15 sec R/L  Single arm shoulder ER stretch RUE, 3x15 sec   Pulley's in flexion, 2x1min  Standing rows with red band, 2x10   Shoulder ext with griselda, horizontal  add/abduction, 2x10   Swiss ball rolls at wall, shoulder flexion, 2x10   Bicep curls 5#, 2x10   Lateral wall walk with yellow band, 2x10    Therex: 25 min  Pulley's 2 min flexion and 2 min abduction  Swiss ball rolls at wall, shoulder flexion, x15  Standing rows with red band, 3x10   Shoulder ext with griselda, horizontal  add/abduction, 2x10   BHB IR stretch with griselda, x20   Open book, x20 R/L   Lateral wall walk with yellow band, 2x10    Therex: 30 min  Pulley's 2 min flexion and 2 min abduction  Open book, x20 R/L  Standing rows with green band, 3x10   Shoulder ext with griselda, horizontal  add/abduction, 2x10   BHB IR stretch with griselda, x20   Seated lat stretch, 3x20 sec  ER/IR band walk  out, yellow, x10 each  Therex: 25 min  Pulley's 2 min flexion and 2 min abduction  Sleeper stretch, 5x15 sec   Open book, x20 R/L  Standing L stretch, 4x15 sec  Standing rows with green band, 3x10   ER/IR band walk out, yellow, 2x10 each   IR stretch with pulley's, 2x1 min Therex: 25 min  UE bike, 2'/2' fwd/bkwd  Reassessment above in objective  ER/IR band walk out, red, 2x10 each R/L    Resisted shoulder flexion, red band, 2x10 R/L   Wall push ups, 2x10  Therex: 23 min  UE bike, 2'/2' fwd/bkwd  Open book, x20 R/L  ER/IR with red band, 2x10 each   Resisted shoulder flexion, red band, 2x10 R/L   Bicep curls 5#, 2x10   Neutral  bicep curls, 5#, 2x10     Wall walks with yellow band, 2x10 R/L  Shoulder flexion stretch with TRX bands, 5x10 sec holds   Manual: 15min  Posterior GH glide, gr III-IV, 3x10  Inferior GH glide, gr III-IV, 2x10  Lateral GH glide, gr III-IV, 2x10  STM to bilat upper traps Manual: 15min  Posterior GH glide, gr III-IV, 3x10  Inferior GH glide, gr III-IV, 2x10  Lateral GH glide, gr III-IV, 2x10  STM to bilat upper traps Manual: 15min  Posterior GH glide, gr III-IV, 3x10  Inferior GH glide, gr III-IV, 2x10  Lateral GH glide, gr III-IV, x10  STM to bilat upper traps  BHB long axis distraction for shoulder IR, x10  Manual: 15min  Posterior GH glide, gr III-IV, 2x10  Lateral GH glide, gr III-IV, 2x10  STM to bilat upper traps  STM and release to subscapularis muscle on the R  BHB long axis distraction for shoulder IR, 2x10  Manual: 20 min  Posterior GH glide, gr III-IV, 2x10  Lateral GH glide, gr III-IV, 2x10  STM and release to subscapularis muscle on the R  PROM shoulder all directions  Manual: 20 min  Posterior GH glide, gr III-IV, 2x10  Lateral GH glide, gr III-IV, 2x10  STM and release to subscapularis muscle on the R  PROM shoulder all directions                    HEP:   Access Code: [email or web address]  URL: [email or web address]  Date: 08/07/2024  Prepared by: Allegra  Danny    Exercises  - Standing Shoulder Row with Anchored Resistance  - 1 x daily - 7 x weekly - 2 sets - 10 reps  - Shoulder ER Stretch in Abduction  - 1-2 x daily - 7 x weekly - 1 sets - 3 reps - 15 sec hold  - Seated Cervical Sidebending Stretch  - 1-2 x daily - 7 x weekly - 1 sets - 3 reps - 15 sec hold  - Sleeper Stretch  - 1 x daily - 7 x weekly - 1 sets - 5 reps - 15 hold  - Standing 'L' Stretch at Counter  - 1 x daily - 7 x weekly - 1 sets - 5 reps - 15 sec hold    Access Code: [email or web address]  URL: [email or web address]  Date: 07/24/2024  Prepared by: Allegra Delgado    Exercises  - Supine Lower Trunk Rotation  - 1 x daily - 7 x weekly - 3 sets - 10 reps  - Supine Bridge  - 1 x daily - 7 x weekly - 2 sets - 10 reps    Charges: TE 2 Manual 1       Total Timed Treatment: 43 min  Total Treatment Time: 43 min

## 2024-08-22 ENCOUNTER — HOSPITAL ENCOUNTER (OUTPATIENT)
Dept: GENERAL RADIOLOGY | Age: 69
Discharge: HOME | End: 2024-08-22
Attending: ORTHOPAEDIC SURGERY
Payer: MEDICARE

## 2024-08-22 DIAGNOSIS — M25.511 RIGHT SHOULDER PAIN, UNSPECIFIED CHRONICITY: ICD-10-CM

## 2024-08-22 PROCEDURE — 73030 X-RAY EXAM OF SHOULDER: CPT | Performed by: ORTHOPAEDIC SURGERY

## 2024-08-26 ENCOUNTER — HOSPITAL ENCOUNTER (OUTPATIENT)
Dept: MAMMOGRAPHY | Facility: HOSPITAL | Age: 69
Discharge: HOME | End: 2024-08-26
Attending: FAMILY MEDICINE
Payer: MEDICARE

## 2024-08-26 DIAGNOSIS — R92.2 INCONCLUSIVE MAMMOGRAM: ICD-10-CM

## 2024-08-26 PROCEDURE — 77065 DX MAMMO INCL CAD UNI: CPT | Performed by: FAMILY MEDICINE

## 2024-08-26 PROCEDURE — 77061 BREAST TOMOSYNTHESIS UNI: CPT | Performed by: FAMILY MEDICINE

## 2024-08-26 PROCEDURE — 76642 ULTRASOUND BREAST LIMITED: CPT | Performed by: FAMILY MEDICINE

## 2024-08-28 ENCOUNTER — OFFICE VISIT (OUTPATIENT)
Dept: ORTHOPEDICS CLINIC | Facility: CLINIC | Age: 69
End: 2024-08-28
Payer: MEDICARE

## 2024-08-28 VITALS — WEIGHT: 130 LBS | BODY MASS INDEX: 23.92 KG/M2 | HEIGHT: 62 IN

## 2024-08-28 DIAGNOSIS — M75.01 ADHESIVE CAPSULITIS OF RIGHT SHOULDER: Primary | ICD-10-CM

## 2024-08-28 PROBLEM — J44.89 ASTHMA WITH COPD (CHRONIC OBSTRUCTIVE PULMONARY DISEASE) (HCC): Chronic | Status: ACTIVE | Noted: 2024-08-28

## 2024-08-28 PROCEDURE — 20610 DRAIN/INJ JOINT/BURSA W/O US: CPT | Performed by: ORTHOPAEDIC SURGERY

## 2024-08-28 PROCEDURE — 99204 OFFICE O/P NEW MOD 45 MIN: CPT | Performed by: ORTHOPAEDIC SURGERY

## 2024-08-28 RX ORDER — KETOROLAC TROMETHAMINE 30 MG/ML
30 INJECTION, SOLUTION INTRAMUSCULAR; INTRAVENOUS ONCE
Status: COMPLETED | OUTPATIENT
Start: 2024-08-28 | End: 2024-08-28

## 2024-08-28 RX ORDER — TRIAMCINOLONE ACETONIDE 40 MG/ML
40 INJECTION, SUSPENSION INTRA-ARTICULAR; INTRAMUSCULAR ONCE
Status: COMPLETED | OUTPATIENT
Start: 2024-08-28 | End: 2024-08-28

## 2024-08-28 RX ADMIN — TRIAMCINOLONE ACETONIDE 40 MG: 40 INJECTION, SUSPENSION INTRA-ARTICULAR; INTRAMUSCULAR at 07:56:00

## 2024-08-28 RX ADMIN — KETOROLAC TROMETHAMINE 30 MG: 30 INJECTION, SOLUTION INTRAMUSCULAR; INTRAVENOUS at 07:56:00

## 2024-08-28 NOTE — H&P
Orthopaedic Surgery  [address] [place] [id number]  [phone]     NEW PATIENT VISIT - HISTORY AND PHYSICAL EXAMINATION     Name: Jennifer Landeros   MRN: [id number]  Date: 2024     CC: Right shoulder pain    REFERRED BY: Lexie Alexander DO    HPI:   Jennifer Landeros is a very pleasant 69 year old right-hand dominant female who presents today for evaluation of right shoulder pain ongoing for 6 months. Initially, she was diagnosed with arthritis 2 years ago and began physical therapy with significant relief. In April, she began experiencing recurrent pain and returned to physical therapy once a week at [facility] with limited relief.  Pain is 0-4/10 with stiffness and limited ROM.     She lives alone and is retired.     PMH:   Past Medical History:    Anemia     delivery, without mention of indication, unspecified as to episode of care(669.70)    Elevated blood pressure reading without diagnosis of hypertension    Esophageal reflux    Palpitations    Personal history of urinary (tract) infection    Unspecified asthma(493.90)    Unspecified sinusitis (chronic)       PAST SURGICAL HX:  Past Surgical History:   Procedure Laterality Date          Colonoscopy  05/14/10;     due every 2 years per Dr. Luo    Colonoscopy  7/15    Kvng biopsy stereo nodule 1 site right (cpt=19081)   ?    benign     Tonsillectomy  1965       FAMILY HX:  Family History   Problem Relation Age of Onset    Asthma Father     Cancer Mother     Stroke Mother     Hypertension Mother     Breast Cancer Mother 62       ALLERGIES:  Seasonal    MEDICATIONS:   Current Outpatient Medications   Medication Sig Dispense Refill    cimetidine 300 MG Oral Tab Take 2 tablets (600 mg total) by mouth 2 (two) times daily. 360 tablet 1    atorvastatin 10 MG Oral Tab Take 1 tablet (10 mg total) by mouth nightly. 90 tablet 1    spironolactone 25 MG Oral Tab Take 1 tablet (25 mg total) by mouth daily. 90 tablet 1     nystatin 859446 UNIT/ML Mouth/Throat Suspension Take 5 mL (500,000 Units total) by mouth 4 (four) times daily. 240 mL 0    Na Sulfate-K Sulfate-Mg Sulf 17.5-3.13-1.6 GM/177ML Oral Solution       albuterol 108 (90 Base) MCG/ACT Inhalation Aero Soln Inhale 2 puffs into the lungs every 4 to 6 hours as needed for Wheezing. 1 each 0    Magnesium 125 MG Oral Cap       Fexofenadine HCl 180 MG Oral Tab Take 1 tablet (180 mg total) by mouth daily as needed.      ValACYclovir HCl 1 G Oral Tab Take 2 tablets by mouth twice a day as directed,  for outbeaks 16 tablet 1    Cholecalciferol (VITAMIN D) 2000 UNITS Oral Cap Take 1 capsule (2,000 Units total) by mouth daily.      Balsalazide Disodium (COLAZAL) 750 MG Oral Cap 3 capsules (2,250 mg total) daily.  3       ROS: A comprehensive 14 point review of systems was performed and was negative aside from the aforementioned per history of present illness.    SOCIAL HX:  Social History     Tobacco Use    Smoking status: Never    Smokeless tobacco: Never   Substance Use Topics    Alcohol use: Yes     Alcohol/week: 0.0 standard drinks of alcohol     Comment: wine 3 nights a week        PE:   Vitals:    08/28/24 0734   Weight: 130 lb   Height: 5' 2\" (1.575 m)     Estimated body mass index is 23.78 kg/m² as calculated from the following:    Height as of this encounter: 5' 2\" (1.575 m).    Weight as of this encounter: 130 lb.    Physical Exam  Constitutional:       Appearance: Normal appearance.   HENT:      Head: Normocephalic and atraumatic.   Eyes:      Extraocular Movements: Extraocular movements intact.   Neck:      Musculoskeletal: Normal range of motion and neck supple.   Cardiovascular:      Pulses: Normal pulses.   Pulmonary:      Effort: Pulmonary effort is normal. No respiratory distress.   Abdominal:      General: There is no distension.   Skin:     General: Skin is warm.      Capillary Refill: Capillary refill takes less than 2 seconds.      Findings: No bruising.    Neurological:      General: No focal deficit present.      Mental Status: Alert.   Psychiatric:         Mood and Affect: Mood normal.     Examination of the right shoulder demonstrates:   Skin is intact, warm and dry.   Cervical:  Full ROM  Spurling's  Negative    Deformity:   none  Atrophy:   none    Scapular winging: Negative    Palpation:     AC Joint   Negative  Biceps Tendon  Negative  Greater Tuberosity Negative    ROM:   Forward Flexion:  150°  Abduction:   60°  External Rotation:  30°  Internal Rotation:  sacrum    Rotator Cuff Strength:   Supraspinatus:   5/5  Subscapularis:   5/5  Infraspinatus/Teres: 5/5    Provocative Tests:   Maddox:   Negative  Speed's:   Negative  Alfalfa's:   Negative  Lift-off:    Negative  Apprehension:  Negative  Sulcus Sign:   Negative    Neurovascular Upper Extremity (Bilateral)  Motor:    5/5 EPL, Finger Abduction, , Pinch, Deltoid  Sensation:   intact to light touch median, ulnar, radial and axillary nerve  Circulation:   Normal, 2+ radial pulse    The contralateral upper extremity is without limitation in range of motion or strength, no positive provocative maneuvers.       Radiographic Examination/Diagnostics:    Shoulder XR personally viewed, independently interpreted and radiology report was reviewed.    XR SHOULDER, COMPLETE (MIN 2 VIEWS), RIGHT (CPT=73030)    Result Date: 8/22/2024  PROCEDURE:  XR SHOULDER, COMPLETE (MIN 2 VIEWS), RIGHT (CPT=73030)  TECHNIQUE:  Multiple views were obtained.  COMPARISON:  DUNIA DIEGO, XR SHOULDER, COMPLETE (MIN 2 VIEWS), RIGHT (CPT=73030), 12/05/2022, 9:03 AM.  INDICATIONS:  M25.511 Right shoulder pain, unspecified chronicity  PATIENT STATED HISTORY: (As transcribed by Technologist)  Right shoulder No injury. Right shoulder pain.    FINDINGS:  There is joint space narrowing and marginal osteophyte formation of the glenohumeral joint and the acromioclavicular joint.            CONCLUSION:  There is osteoarthritis of the glenohumeral  joint and the acromioclavicular joint.  There is no acute abnormality.   LOCATION:  [facility]   Dictated by (CST): Boo Gómez MD on 8/22/2024 at 10:57 AM     Finalized by (CST): Boo Gómez MD on 8/22/2024 at 10:58 AM        Independent review: Mild glenohumeral joint space narrowing mostly at the inferior margin, although not clinically significant in the setting.    IMPRESSION: Jennifer Landeros is a 69 year old Right hand dominant female with right adhesive capsulitis symptomatic for 6 months. Physical therapy once a week at [facility] has provided limited relief.    We elected to maximize conservative management with intra-articular corticosteroid and ketorolac injection coupled with physical therapy.     PLAN:   We had a detailed discussion outlining the etiology, anatomy, pathophysiology, and natural history of adhesive capsulitis. Imaging was reviewed in detail and correlated to a 3-dimensional model of the shoulder.  I outlined that in certain scenarios there is persistent shoulder stiffness and lack of symptomatic resolution which may require surgical management with manipulation under anesthesia or lysis of adhesions/arthroscopic capsular release.  This is rare, and often repeat corticosteroid injections are capable of resolving symptoms.    I provided a glenohumeral intra-articular corticosteroid and ketorolac injection using sterile technique.  The patient noted immediate symptomatic relief and slight increase in range of motion.  They were taught home exercises to regain forward elevation, internal rotation, abduction and provided with a physical therapy prescription.  All questions were answered to their satisfaction.     FOLLOW-UP:   Return to clinic on an as needed basis.       Filemon Dick MD  Knee, Shoulder, & Elbow Surgery / Sports Medicine Specialist  Orthopaedic Surgery  [address] [place] [address] [id number] [place] [address].org  [email or web address]  t: [phone]  o: [phone]   f: [phone]    This note was dictated using Dragon software.  While it was briefly proofread prior to completion, some grammatical, spelling, and word choice errors due to dictation may still occur.

## 2024-08-28 NOTE — PROCEDURES
Right Shoulder Glenohumeral Joint Injection    Name: Jennifer Landeros   MRN: [id number]  Date: 8/28/2024     Clinical Indications:   Adhesive Capsulitis.     After informed consent, the injection site was marked, sterilized with topical chlorhexidine antiseptic, and locally anesthetized with skin refrigerant.    The patient was seated upright and the shoulder was exposed. Using sterile technique: 1 mL of 30mg/mL of Ketorolac, 2 mL of 0.5% Bupivicaine, 2 mL of 1% Lidocaine, and 1 mg of 40mg/mL of Triamcinolone (Kenalog) was injected with a Anterior approach utilizing a 22 gauge needle.  A band-aid was applied.  The patient tolerated the procedure well.        Filemon Dick MD  Knee, Shoulder, & Elbow Surgery / Sports Medicine Specialist  Orthopaedic Surgery  [address] [place] [address] [id number] [place] [address].org  [email or web address]  t: [phone]  o: [phone]  f: [phone]

## 2024-08-30 ENCOUNTER — OFFICE VISIT (OUTPATIENT)
Dept: PHYSICAL THERAPY | Facility: HOSPITAL | Age: 69
End: 2024-08-30
Attending: FAMILY MEDICINE
Payer: MEDICARE

## 2024-08-30 PROCEDURE — 97110 THERAPEUTIC EXERCISES: CPT

## 2024-08-30 PROCEDURE — 97140 MANUAL THERAPY 1/> REGIONS: CPT

## 2024-08-30 NOTE — PROGRESS NOTES
Diagnosis:   Acute pain of right shoulder (M25.511)  Acute midline low back pain without sciatica (M54.50)      Referring Provider: Lexie Alexander  Date of Evaluation:    7/8/2024    Precautions:  None Next MD visit:   none scheduled  Date of Surgery: n/a   Insurance Primary/Secondary: [employer]ETNA [facility] / N/A     # Auth Visits: 10           Subjective: Pt got injection at visit with Dr. Dick, no longer has pain. IR is getting better however still limited     Pain: no pain at rest      Objective:   8/14/2024 - Reassessment   Cervical Screen: R rotation 50 deg, R side bending 10 deg, all others WNL     AROM: (* denotes performed with pain)  Shoulder Initial Eval Shoulder 8/14 Elbow   Flexion: R 135; L WNL  Abduction: R 155; L WNL  ER: R T2; L T2  IR: R L4*; L T8 Flexion: R 150 deg  Abduction: R 155 deg   ER: R T2  IR: R L4* Flexion: R WNL; L WNL  Extension: R WNl; L WNL         Accessory motion:   Posterior GH glide: stiff end feel on R  Inferior GH glide: WFL R  Lateral GH glide: slight stiff end feel on R     Flexibility: increased tension to bilateral upper traps     Strength/MMT: (* denotes performed with pain)  Shoulder Elbow Wrist   Flexion: R 5/5; L 5/5  Abduction: R 5/5; L 5/5  ER: R 5/5; L 5/5  IR: R 5/5; L 5/5 Flexion: R 5/5; L 5/5  Extension: R 5/5; L 5/5    Wrist extension: R 4+/5; L 5/5  Wrist flexion: R 5/5; L 5/5      Special tests:   Hawkin's Edil: negative  Neer's negative  Drop arm: negative   Load and shift: negative  Biceps load II: negative   Bear hug: negative   Empty can/full can: negative   Biceps tendon: negative     Assessment: Pt has resolution of all pain following injection from Dr. Dick, still some limitations in shoulder IR. Focused on manual intervention today to stretch shoulder IR and ER to improve range of motion. Pt still has some tightness in subscapularis and teres major muscles with palpation, but no pain. Following stretching, pt able to get to L4 with BHB IR. Education with patient  on really focusing on stretching over next week to improve flexibility. Doctor recommends 2 visits per week, able to get patient in 2x next week, will put on wait list for next few weeks for additional appointments. Pt agrees to plan.       Goals:   (to be met in 8 visits)   - Pt will have full R shoulder flexion to be able to reach into overhead cabinet without pain. Progressing, 8/21/2024  - Pt will have WFL R shoulder IR to be able to complete ADL's without pain. Progressing, 8/21/2024  - Pt will have WNL upper trap tension to decrease stress on shoulder.Progressing, 8/21/2024  - Pt will be independent with comprehensive HEP to maintain gains made in therapy. Progressing, 8/21/2024    Plan: Continue per plan of care.     Certification From: 8/14/2024  To:11/12/2024    Date: 7/17/2024  TX#: 2/8 Date: 7/24/2024              TX#: 3/8 Date: 8/2/2024              TX#: 4/8 Date:8/7/2024                 TX#: 5/8 Date: 8/14/2024  Tx#: 6/8 Date: 8/21/2024  Tx#: 7/10 Date: 8/30/2024  Tx#: 8/10   Therex: 23min  Neck side bending stretch, 3x15 sec R/L  Single arm shoulder ER stretch RUE, 3x15 sec   Pulley's in flexion, 2x1min  Standing rows with red band, 2x10   Shoulder ext with griselda, horizontal  add/abduction, 2x10   Swiss ball rolls at wall, shoulder flexion, 2x10   Bicep curls 5#, 2x10   Lateral wall walk with yellow band, 2x10    Therex: 25 min  Pulley's 2 min flexion and 2 min abduction  Swiss ball rolls at wall, shoulder flexion, x15  Standing rows with red band, 3x10   Shoulder ext with griselda, horizontal  add/abduction, 2x10   BHB IR stretch with griselda, x20   Open book, x20 R/L   Lateral wall walk with yellow band, 2x10    Therex: 30 min  Pulley's 2 min flexion and 2 min abduction  Open book, x20 R/L  Standing rows with green band, 3x10   Shoulder ext with griselda, horizontal  add/abduction, 2x10   BHB IR stretch with griselda, x20   Seated lat stretch, 3x20 sec  ER/IR band walk out, yellow, x10 each  Therex: 25 min  Pulley's 2  min flexion and 2 min abduction  Sleeper stretch, 5x15 sec   Open book, x20 R/L  Standing L stretch, 4x15 sec  Standing rows with green band, 3x10   ER/IR band walk out, yellow, 2x10 each   IR stretch with pulley's, 2x1 min Therex: 25 min  UE bike, 2'/2' fwd/bkwd  Reassessment above in objective  ER/IR band walk out, red, 2x10 each R/L    Resisted shoulder flexion, red band, 2x10 R/L   Wall push ups, 2x10  Therex: 23 min  UE bike, 2'/2' fwd/bkwd  Open book, x20 R/L  ER/IR with red band, 2x10 each   Resisted shoulder flexion, red band, 2x10 R/L   Bicep curls 5#, 2x10   Neutral  bicep curls, 5#, 2x10     Wall walks with yellow band, 2x10 R/L  Shoulder flexion stretch with TRX bands, 5x10 sec holds Therex: 15 min  UE bike, 2'/2' fwd/bkwd  Sleeper stretch, 2x30 sec  Standing L stretch with twist, 3x10 sec  IR with pulley's 2 min   Manual: 15min  Posterior GH glide, gr III-IV, 3x10  Inferior GH glide, gr III-IV, 2x10  Lateral GH glide, gr III-IV, 2x10  STM to bilat upper traps Manual: 15min  Posterior GH glide, gr III-IV, 3x10  Inferior GH glide, gr III-IV, 2x10  Lateral GH glide, gr III-IV, 2x10  STM to bilat upper traps Manual: 15min  Posterior GH glide, gr III-IV, 3x10  Inferior GH glide, gr III-IV, 2x10  Lateral GH glide, gr III-IV, x10  STM to bilat upper traps  BHB long axis distraction for shoulder IR, x10  Manual: 15min  Posterior GH glide, gr III-IV, 2x10  Lateral GH glide, gr III-IV, 2x10  STM to bilat upper traps  STM and release to subscapularis muscle on the R  BHB long axis distraction for shoulder IR, 2x10  Manual: 20 min  Posterior GH glide, gr III-IV, 2x10  Lateral GH glide, gr III-IV, 2x10  STM and release to subscapularis muscle on the R  PROM shoulder all directions  Manual: 20 min  Posterior GH glide, gr III-IV, 2x10  Lateral GH glide, gr III-IV, 2x10  STM and release to subscapularis muscle on the R  PROM shoulder all directions  Manual: 25 min  Posterior GH glide, gr III-IV, 2x10  Lateral GH  glide, gr III-IV, 2x10  Inferior GH glide in 120 deg of scaption, gr III, multiple bouts   STM and release to subscapularis and teres major muscle on the R  PROM shoulder IR/ER in 90/90                       HEP:   Access Code: [email or web address]  URL: [email or web address]  Date: 08/30/2024  Prepared by: Allegra Delgado    Exercises  - Standing Shoulder Row with Anchored Resistance  - 1 x daily - 7 x weekly - 2 sets - 10 reps  - Shoulder ER Stretch in Abduction  - 1-2 x daily - 7 x weekly - 1 sets - 3 reps - 15 sec hold  - Seated Cervical Sidebending Stretch  - 1-2 x daily - 7 x weekly - 1 sets - 3 reps - 15 sec hold  - Sleeper Stretch  - 1 x daily - 7 x weekly - 1 sets - 5 reps - 15 hold  - Standing 'L' Stretch at Counter  - 1 x daily - 7 x weekly - 1 sets - 5 reps - 15 sec hold  - Standing Shoulder Internal Rotation Stretch with Towel  - 1 x daily - 7 x weekly - 2 sets - 10 reps  - Latissimus Dorsi Stretch at Wall  - 1 x daily - 7 x weekly - 2 sets - 10 reps    Access Code: [email or web address]  URL: [email or web address]  Date: 07/24/2024  Prepared by: Allegra Hedin    Exercises  - Supine Lower Trunk Rotation  - 1 x daily - 7 x weekly - 3 sets - 10 reps  - Supine Bridge  - 1 x daily - 7 x weekly - 2 sets - 10 reps    Charges: TE 1 Manual 2       Total Timed Treatment: 40 min  Total Treatment Time: 40 min

## 2024-09-04 ENCOUNTER — OFFICE VISIT (OUTPATIENT)
Dept: PHYSICAL THERAPY | Facility: HOSPITAL | Age: 69
End: 2024-09-04
Attending: FAMILY MEDICINE
Payer: MEDICARE

## 2024-09-04 PROCEDURE — 97110 THERAPEUTIC EXERCISES: CPT | Performed by: PHYSICAL THERAPY ASSISTANT

## 2024-09-04 PROCEDURE — 97140 MANUAL THERAPY 1/> REGIONS: CPT | Performed by: PHYSICAL THERAPY ASSISTANT

## 2024-09-04 NOTE — PROGRESS NOTES
Diagnosis:   Acute pain of right shoulder (M25.511)  Acute midline low back pain without sciatica (M54.50)      Referring Provider: Lexie Alexander  Date of Evaluation:    7/8/2024    Precautions:  None Next MD visit:   none scheduled  Date of Surgery: n/a   Insurance Primary/Secondary: [employer]ET[employer] [facility] / N/A     # Auth Visits: 10           Subjective: Pt reports injection has been very successful - one week post injection at this time. Today was able to put bra on 'normally'.    Pain: 0/10 at rest  reports tightness on IR.     Objective:   8/14/2024 - Reassessment   Cervical Screen: R rotation 50 deg, R side bending 10 deg, all others WNL     AROM: (* denotes performed with pain)  Shoulder Initial Eval Shoulder 8/14 Elbow   Flexion: R 135; L WNL  Abduction: R 155; L WNL  ER: R T2; L T2  IR: R L4*; L T8 Flexion: R 150 deg  Abduction: R 155 deg   ER: R T2  IR: R L4* Flexion: R WNL; L WNL  Extension: R WNl; L WNL         Accessory motion:   Posterior GH glide: stiff end feel on R  Inferior GH glide: WFL R  Lateral GH glide: slight stiff end feel on R     Flexibility: increased tension to bilateral upper traps     Strength/MMT: (* denotes performed with pain)  Shoulder Elbow Wrist   Flexion: R 5/5; L 5/5  Abduction: R 5/5; L 5/5  ER: R 5/5; L 5/5  IR: R 5/5; L 5/5 Flexion: R 5/5; L 5/5  Extension: R 5/5; L 5/5    Wrist extension: R 4+/5; L 5/5  Wrist flexion: R 5/5; L 5/5      Special tests:   Hawkin's Edil: negative  Neer's negative  Drop arm: negative   Load and shift: negative  Biceps load II: negative   Bear hug: negative   Empty can/full can: negative   Biceps tendon: negative     Assessment: Pt continues with resolution of all pain following injection from Dr. Dick and states she continues with  some limitations in shoulder IR and ER. Continued to address limitations with manual interventions today to improve range of motion. Reinforced education with patient on continued stretching over next week to improve  flexibility and maintain gains. Pt discussed long term postural issues where she will shrug in response to stressors. Introduced scap retractions with verbal and tactile cues. Pt reported no adverse reaction in response to interventions today. PT remains necessary to address ongoing deficits as identified at initial assessment.        Goals:   (to be met in 8 visits)   - Pt will have full R shoulder flexion to be able to reach into overhead cabinet without pain. Progressing, 8/21/2024  - Pt will have WFL R shoulder IR to be able to complete ADL's without pain. Progressing, 8/21/2024  - Pt will have WNL upper trap tension to decrease stress on shoulder.Progressing, 8/21/2024  - Pt will be independent with comprehensive HEP to maintain gains made in therapy. Progressing, 8/21/2024    Plan: Continue per plan of care.     Certification From: 8/14/2024  To:11/12/2024    Date:8/7/2024                 TX#: 5/8 Date: 8/14/2024  Tx#: 6/8 Date: 8/21/2024  Tx#: 7/10 Date: 8/30/2024  Tx#: 8/10 9/4/2024   TX# 9/10    Therex: 25 min  Pulley's 2 min flexion and 2 min abduction  Sleeper stretch, 5x15 sec   Open book, x20 R/L  Standing L stretch, 4x15 sec  Standing rows with green band, 3x10   ER/IR band walk out, yellow, 2x10 each   IR stretch with pulley's, 2x1 min Therex: 25 min  UE bike, 2'/2' fwd/bkwd  Reassessment above in objective  ER/IR band walk out, red, 2x10 each R/L    Resisted shoulder flexion, red band, 2x10 R/L   Wall push ups, 2x10  Therex: 23 min  UE bike, 2'/2' fwd/bkwd  Open book, x20 R/L  ER/IR with red band, 2x10 each   Resisted shoulder flexion, red band, 2x10 R/L   Bicep curls 5#, 2x10   Neutral  bicep curls, 5#, 2x10     Wall walks with yellow band, 2x10 R/L  Shoulder flexion stretch with TRX bands, 5x10 sec holds Therex: 15 min  UE bike, 2'/2' fwd/bkwd  Sleeper stretch, 2x30 sec  Standing L stretch with twist, 3x10 sec  IR with pulley's 2 min Therex 20 mins  Subjective history  UE bike, 2'/2'  fwd/bkwd  Pulleys x 2 min IE   Resisted IR / ER walkouts - next visit   Scap retractions x 20           Manual: 15min  Posterior GH glide, gr III-IV, 2x10  Lateral GH glide, gr III-IV, 2x10  STM to bilat upper traps  STM and release to subscapularis muscle on the R  BHB long axis distraction for shoulder IR, 2x10  Manual: 20 min  Posterior GH glide, gr III-IV, 2x10  Lateral GH glide, gr III-IV, 2x10  STM and release to subscapularis muscle on the R  PROM shoulder all directions  Manual: 20 min  Posterior GH glide, gr III-IV, 2x10  Lateral GH glide, gr III-IV, 2x10  STM and release to subscapularis muscle on the R  PROM shoulder all directions  Manual: 25 min  Posterior GH glide, gr III-IV, 2x10  Lateral GH glide, gr III-IV, 2x10  Inferior GH glide in 120 deg of scaption, gr III, multiple bouts   STM and release to subscapularis and teres major muscle on the R  PROM shoulder IR/ER in 90/90   Manual: 25 min  Posterior GH glide, gr III-IV, 2x10  Lateral GH glide, gr III-IV, 2x10  Inferior GH glide in 120 deg of scaption, gr III, multiple bouts   STM and release to subscapularis and teres major muscle on the R  PROM shoulder IR/ER in 90/90                 HEP:   Access Code: [email or web address]  URL: [email or web address]  Date: 08/30/2024  Prepared by: Allegra Delgado    Exercises  - Standing Shoulder Row with Anchored Resistance  - 1 x daily - 7 x weekly - 2 sets - 10 reps  - Shoulder ER Stretch in Abduction  - 1-2 x daily - 7 x weekly - 1 sets - 3 reps - 15 sec hold  - Seated Cervical Sidebending Stretch  - 1-2 x daily - 7 x weekly - 1 sets - 3 reps - 15 sec hold  - Sleeper Stretch  - 1 x daily - 7 x weekly - 1 sets - 5 reps - 15 hold  - Standing 'L' Stretch at Counter  - 1 x daily - 7 x weekly - 1 sets - 5 reps - 15 sec hold  - Standing Shoulder Internal Rotation Stretch with Towel  - 1 x daily - 7 x weekly - 2 sets - 10 reps  - Latissimus Dorsi Stretch at Wall  - 1 x daily - 7 x weekly - 2 sets - 10  reps    Access Code: [email or web address]  URL: [email or web address]  Date: 07/24/2024  Prepared by: Allegra Delgado    Exercises  - Supine Lower Trunk Rotation  - 1 x daily - 7 x weekly - 3 sets - 10 reps  - Supine Bridge  - 1 x daily - 7 x weekly - 2 sets - 10 reps    Charges: TE 1 Manual 2       Total Timed Treatment: 45 min  Total Treatment Time: 45 min

## 2024-09-06 ENCOUNTER — OFFICE VISIT (OUTPATIENT)
Dept: PHYSICAL THERAPY | Facility: HOSPITAL | Age: 69
End: 2024-09-06
Attending: FAMILY MEDICINE
Payer: MEDICARE

## 2024-09-06 PROCEDURE — 97140 MANUAL THERAPY 1/> REGIONS: CPT

## 2024-09-06 PROCEDURE — 97110 THERAPEUTIC EXERCISES: CPT

## 2024-09-06 NOTE — PROGRESS NOTES
Diagnosis:   Acute pain of right shoulder (M25.511)  Acute midline low back pain without sciatica (M54.50)      Referring Provider: Lexie Alexander  Date of Evaluation:    7/8/2024    Precautions:  None Next MD visit:   none scheduled  Date of Surgery: n/a   Insurance Primary/Secondary: AETNA [facility] / N/A     # Auth Visits: 10          Progress Summary  Pt has attended 10 visits in Physical Therapy.     Subjective: pt reports shoulder is still feeling much better, still most tightness when reaching behind back but no pain  Pain: 0/10     Objective:   9/6/2024 - Reassessment   Cervical Screen: R rotation 70 deg, R side bending 25 deg, all others WNL     AROM: (* denotes performed with pain)  Shoulder Initial Eval Shoulder 8/14 Shoulder 9/6/2024 Elbow   Flexion: R 135; L WNL  Abduction: R 155; L WNL  ER: R T2; L T2  IR: R L4*; L T8 Flexion: R 150 deg  Abduction: R 155 deg   ER: R T2  IR: R L4* Flexion: R 155 deg   Abduction: R 160 deg  ER: T2 (77 deg at side)   IR: L1 (60 deg in 90/90 supine)  Flexion: R WNL; L WNL  Extension: R WNl; L WNL         Accessory motion:   Posterior GH glide: WFL R  Inferior GH glide: WFL R  Lateral GH glide: WFL R     Flexibility: increased tension to bilateral upper traps     Strength/MMT: (* denotes performed with pain)  Shoulder Elbow Wrist   Flexion: R 5/5; L 5/5  Abduction: R 5/5; L 5/5  ER: R 5/5; L 5/5  IR: R 5/5; L 5/5 Flexion: R 5/5; L 5/5  Extension: R 5/5; L 5/5    Wrist extension: R 4+/5; L 5/5  Wrist flexion: R 5/5; L 5/5      Special tests:   Hawkin's Edil: negative  Neer's negative  Drop arm: negative   Load and shift: negative  Biceps load II: negative   Bear hug: negative   Empty can/full can: negative   Biceps tendon: negative     Assessment: Pt has attended 10 visits in outpatient physical therapy. In this time and with injection for frozen shoulder, pt has been able to greatly reduce pain and improve AROM of the shoulder. Continued mild limitations in shoulder IR, however  all other motions WNL. Noted improvements in cervical range of motion today as well. Pt able to tolerate progressive rotator cuff and scapular strengthening with no pain. Pt would benefit from continued skilled intervention per recommendations from Dr. Dick to fully regain shoulder IR and have optimal recovery from frozen shoulder.       Goals:   (to be met in 10 visits)   - Pt will have full R shoulder flexion to be able to reach into overhead cabinet without pain. MET  - Pt will have WFL R shoulder IR to be able to complete ADL's without pain. Progressing, 9/6/2024  - Pt will have WNL upper trap tension to decrease stress on shoulder.Progressing, 9/6/2024  - Pt will be independent with comprehensive HEP to maintain gains made in therapy. Progressing, 9/6/2024    Plan: Continue skilled Physical Therapy 1-2 x/week or a total of 6 additional visits over a 90 day period.        Patient/Family/Caregiver was advised of these findings, precautions, and treatment options and has agreed to actively participate in planning and for this course of care.    Thank you for your referral. If you have any questions, please contact me at Dept: [phone].    Sincerely,  Electronically signed by therapist: Allegra Delgado PT, DPT    Certification From: 9/6/2024  To:12/5/2024    Date:8/7/2024                 TX#: 5/8 Date: 8/14/2024  Tx#: 6/8 Date: 8/21/2024  Tx#: 7/10 Date: 8/30/2024  Tx#: 8/10 9/4/2024   TX# 9/10  Date: 9/6/2024  Tx#: 10/10   Therex: 25 min  Pulley's 2 min flexion and 2 min abduction  Sleeper stretch, 5x15 sec   Open book, x20 R/L  Standing L stretch, 4x15 sec  Standing rows with green band, 3x10   ER/IR band walk out, yellow, 2x10 each   IR stretch with pulley's, 2x1 min Therex: 25 min  UE bike, 2'/2' fwd/bkwd  Reassessment above in objective  ER/IR band walk out, red, 2x10 each R/L    Resisted shoulder flexion, red band, 2x10 R/L   Wall push ups, 2x10  Therex: 23 min  UE bike, 2'/2' fwd/jaimewd  Open book, x20  R/L  ER/IR with red band, 2x10 each   Resisted shoulder flexion, red band, 2x10 R/L   Bicep curls 5#, 2x10   Neutral  bicep curls, 5#, 2x10     Wall walks with yellow band, 2x10 R/L  Shoulder flexion stretch with TRX bands, 5x10 sec holds Therex: 15 min  UE bike, 2'/2' fwd/bkwd  Sleeper stretch, 2x30 sec  Standing L stretch with twist, 3x10 sec  IR with pulley's 2 min Therex 20 mins  Subjective history  UE bike, 2'/2' fwd/bkwd  Pulleys x 2 min IE   Resisted IR / ER walkouts - next visit   Scap retractions x 20         Therex: 20 min  UE bike, 2'/2' fwd/bkwd  Reassessment above in objective  Low cross body stretch, 3x30 sec   Standing row, green, 3x10 R/L   Wall walks with yellow band, 2x10 R/L  Bilateral shoulder ER, red band, 2x10  Bicep curl into OH press, #4, 2x5    Manual: 15min  Posterior GH glide, gr III-IV, 2x10  Lateral GH glide, gr III-IV, 2x10  STM to bilat upper traps  STM and release to subscapularis muscle on the R  BHB long axis distraction for shoulder IR, 2x10  Manual: 20 min  Posterior GH glide, gr III-IV, 2x10  Lateral GH glide, gr III-IV, 2x10  STM and release to subscapularis muscle on the R  PROM shoulder all directions  Manual: 20 min  Posterior GH glide, gr III-IV, 2x10  Lateral GH glide, gr III-IV, 2x10  STM and release to subscapularis muscle on the R  PROM shoulder all directions  Manual: 25 min  Posterior GH glide, gr III-IV, 2x10  Lateral GH glide, gr III-IV, 2x10  Inferior GH glide in 120 deg of scaption, gr III, multiple bouts   STM and release to subscapularis and teres major muscle on the R  PROM shoulder IR/ER in 90/90   Manual: 25 min  Posterior GH glide, gr III-IV, 2x10  Lateral GH glide, gr III-IV, 2x10  Inferior GH glide in 120 deg of scaption, gr III, multiple bouts   STM and release to subscapularis and teres major muscle on the R  PROM shoulder IR/ER in 90/90 Manual: 25 min  STM and release to subscapularis and teres major muscle on the R  PROM shoulder IR/ER in  90/90  Hawk  to middle deltoid, biceps tendon and lateral shoulder                   HEP:   Access Code: [email or web address]  URL: [email or web address]  Date: 08/30/2024  Prepared by: Allegra Delgado    Exercises  - Standing Shoulder Row with Anchored Resistance  - 1 x daily - 7 x weekly - 2 sets - 10 reps  - Shoulder ER Stretch in Abduction  - 1-2 x daily - 7 x weekly - 1 sets - 3 reps - 15 sec hold  - Seated Cervical Sidebending Stretch  - 1-2 x daily - 7 x weekly - 1 sets - 3 reps - 15 sec hold  - Sleeper Stretch  - 1 x daily - 7 x weekly - 1 sets - 5 reps - 15 hold  - Standing 'L' Stretch at Counter  - 1 x daily - 7 x weekly - 1 sets - 5 reps - 15 sec hold  - Standing Shoulder Internal Rotation Stretch with Towel  - 1 x daily - 7 x weekly - 2 sets - 10 reps  - Latissimus Dorsi Stretch at Wall  - 1 x daily - 7 x weekly - 2 sets - 10 reps    Access Code: [email or web address]  URL: [email or web address]  Date: 07/24/2024  Prepared by: Allegra Delgado    Exercises  - Supine Lower Trunk Rotation  - 1 x daily - 7 x weekly - 3 sets - 10 reps  - Supine Bridge  - 1 x daily - 7 x weekly - 2 sets - 10 reps    Charges: TE 1 Manual 2       Total Timed Treatment: 45 min  Total Treatment Time: 45 min

## 2024-09-09 ENCOUNTER — APPOINTMENT (OUTPATIENT)
Dept: PHYSICAL THERAPY | Facility: HOSPITAL | Age: 69
End: 2024-09-09
Attending: FAMILY MEDICINE
Payer: MEDICARE

## 2024-09-11 ENCOUNTER — OFFICE VISIT (OUTPATIENT)
Dept: PHYSICAL THERAPY | Facility: HOSPITAL | Age: 69
End: 2024-09-11
Attending: FAMILY MEDICINE
Payer: MEDICARE

## 2024-09-11 PROCEDURE — 97140 MANUAL THERAPY 1/> REGIONS: CPT

## 2024-09-11 PROCEDURE — 97110 THERAPEUTIC EXERCISES: CPT

## 2024-09-11 NOTE — PROGRESS NOTES
Diagnosis:   Acute pain of right shoulder (M25.511)  Acute midline low back pain without sciatica (M54.50)      Referring Provider: Lexie Alexander  Date of Evaluation:    7/8/2024    Precautions:  None Next MD visit:   none scheduled  Date of Surgery: n/a   Insurance Primary/Secondary: [employer] [facility] / N/A     # Auth Visits: 16           Subjective: Pt is doing well, still feels a lot of stretch with the IR stretch BHB and cross body stretch.     Pain: 0/10       Objective:   9/11/2024  - R IR AROM: T10    9/6/2024 - Reassessment   Cervical Screen: R rotation 70 deg, R side bending 25 deg, all others WNL     AROM: (* denotes performed with pain)  Shoulder Initial Eval Shoulder 8/14 Shoulder 9/6/2024 Elbow   Flexion: R 135; L WNL  Abduction: R 155; L WNL  ER: R T2; L T2  IR: R L4*; L T8 Flexion: R 150 deg  Abduction: R 155 deg   ER: R T2  IR: R L4* Flexion: R 155 deg   Abduction: R 160 deg  ER: T2 (77 deg at side)   IR: L1 (60 deg in 90/90 supine)  Flexion: R WNL; L WNL  Extension: R WNl; L WNL         Accessory motion:   Posterior GH glide: WFL R  Inferior GH glide: WFL R  Lateral GH glide: WFL R     Flexibility: increased tension to bilateral upper traps     Strength/MMT: (* denotes performed with pain)  Shoulder Elbow Wrist   Flexion: R 5/5; L 5/5  Abduction: R 5/5; L 5/5  ER: R 5/5; L 5/5  IR: R 5/5; L 5/5 Flexion: R 5/5; L 5/5  Extension: R 5/5; L 5/5    Wrist extension: R 4+/5; L 5/5  Wrist flexion: R 5/5; L 5/5      Special tests:   Hawkin's Edil: negative  Neer's negative  Drop arm: negative   Load and shift: negative  Biceps load II: negative   Bear hug: negative   Empty can/full can: negative   Biceps tendon: negative     Assessment: Pt continues to improve in right shoulder IR AROM, able to get to T10 today before any exercise or stretches, no pain with motion. Continued with manual intervention for improve joint and muscle mobility. Began rotator cuff strengthening again with red band and towel under arm for  slight abduction, pt completes with min cues for form, no pain. Wall walks also completed for rotator cuff activation, pt reports this is the most tiring exercise, but not painful. Pt educated on resuming strengthening exercises at home in addition to stretches. Continue per plan of care to achieve goals.       Goals:   (to be met in 10 visits)   - Pt will have full R shoulder flexion to be able to reach into overhead cabinet without pain. MET  - Pt will have WFL R shoulder IR to be able to complete ADL's without pain. Progressing, 9/6/2024  - Pt will have WNL upper trap tension to decrease stress on shoulder.Progressing, 9/6/2024  - Pt will be independent with comprehensive HEP to maintain gains made in therapy. Progressing, 9/6/2024    Plan: Continue per plan of care.     Date:8/7/2024                 TX#: 5/8 Date: 8/14/2024  Tx#: 6/8 Date: 8/21/2024  Tx#: 7/10 Date: 8/30/2024  Tx#: 8/10 9/4/2024   TX# 9/10  Date: 9/6/2024  Tx#: 10/10 Date: 9/11/2024  Tx#: 11/16   Therex: 25 min  Pulley's 2 min flexion and 2 min abduction  Sleeper stretch, 5x15 sec   Open book, x20 R/L  Standing L stretch, 4x15 sec  Standing rows with green band, 3x10   ER/IR band walk out, yellow, 2x10 each   IR stretch with pulley's, 2x1 min Therex: 25 min  UE bike, 2'/2' fwd/bkwd  Reassessment above in objective  ER/IR band walk out, red, 2x10 each R/L    Resisted shoulder flexion, red band, 2x10 R/L   Wall push ups, 2x10  Therex: 23 min  UE bike, 2'/2' fwd/bkwd  Open book, x20 R/L  ER/IR with red band, 2x10 each   Resisted shoulder flexion, red band, 2x10 R/L   Bicep curls 5#, 2x10   Neutral  bicep curls, 5#, 2x10     Wall walks with yellow band, 2x10 R/L  Shoulder flexion stretch with TRX bands, 5x10 sec holds Therex: 15 min  UE bike, 2'/2' fwd/bkwd  Sleeper stretch, 2x30 sec  Standing L stretch with twist, 3x10 sec  IR with pulley's 2 min Therex 20 mins  Subjective history  UE bike, 2'/2' fwd/bkwd  Pulleys x 2 min IR  Resisted IR / ER  walkouts - next visit   Scap retractions x 20         Therex: 20 min  UE bike, 2'/2' fwd/bkwd  Reassessment above in objective  Low cross body stretch, 3x30 sec   Standing row, green, 3x10 R/L   Wall walks with yellow band, 2x10 R/L  Bilateral shoulder ER, red band, 2x10  Bicep curl into OH press, #4, 2x5  Therex: 25 min  UE bike, 2'/2' fwd/bkwd  Pulley's IR, 2 min  Standing row, green, 3x10 R/L   Wall walks with yellow band, 2x10 R/L  ER/IR with red band, 2x10 each R/L   Bicep curl into OH press, #5, 3x5    Manual: 15min  Posterior GH glide, gr III-IV, 2x10  Lateral GH glide, gr III-IV, 2x10  STM to bilat upper traps  STM and release to subscapularis muscle on the R  BHB long axis distraction for shoulder IR, 2x10  Manual: 20 min  Posterior GH glide, gr III-IV, 2x10  Lateral GH glide, gr III-IV, 2x10  STM and release to subscapularis muscle on the R  PROM shoulder all directions  Manual: 20 min  Posterior GH glide, gr III-IV, 2x10  Lateral GH glide, gr III-IV, 2x10  STM and release to subscapularis muscle on the R  PROM shoulder all directions  Manual: 25 min  Posterior GH glide, gr III-IV, 2x10  Lateral GH glide, gr III-IV, 2x10  Inferior GH glide in 120 deg of scaption, gr III, multiple bouts   STM and release to subscapularis and teres major muscle on the R  PROM shoulder IR/ER in 90/90   Manual: 25 min  Posterior GH glide, gr III-IV, 2x10  Lateral GH glide, gr III-IV, 2x10  Inferior GH glide in 120 deg of scaption, gr III, multiple bouts   STM and release to subscapularis and teres major muscle on the R  PROM shoulder IR/ER in 90/90 Manual: 25 min  STM and release to subscapularis and teres major muscle on the R  PROM shoulder IR/ER in 90/90  Hawk  to middle deltoid, biceps tendon and lateral shoulder Manual: 18 min  STM upper traps and cervical paraspinals  PROM shoulder IR/ER in 90/90  Lateral/inferior GH wallace, gr IV, multiple bouts each way                     HEP:   Access Code: [email or web address]  URL:  [email or web address]  Date: 08/30/2024  Prepared by: Allegra Delgado    Exercises  - Standing Shoulder Row with Anchored Resistance  - 1 x daily - 7 x weekly - 2 sets - 10 reps  - Shoulder ER Stretch in Abduction  - 1-2 x daily - 7 x weekly - 1 sets - 3 reps - 15 sec hold  - Seated Cervical Sidebending Stretch  - 1-2 x daily - 7 x weekly - 1 sets - 3 reps - 15 sec hold  - Sleeper Stretch  - 1 x daily - 7 x weekly - 1 sets - 5 reps - 15 hold  - Standing 'L' Stretch at Counter  - 1 x daily - 7 x weekly - 1 sets - 5 reps - 15 sec hold  - Standing Shoulder Internal Rotation Stretch with Towel  - 1 x daily - 7 x weekly - 2 sets - 10 reps  - Latissimus Dorsi Stretch at Wall  - 1 x daily - 7 x weekly - 2 sets - 10 reps    Access Code: [email or web address]  URL: [email or web address]  Date: 07/24/2024  Prepared by: Allegra Delgado    Exercises  - Supine Lower Trunk Rotation  - 1 x daily - 7 x weekly - 3 sets - 10 reps  - Supine Bridge  - 1 x daily - 7 x weekly - 2 sets - 10 reps    Charges: TE 2 Manual 1       Total Timed Treatment: 43 min  Total Treatment Time: 43 min

## 2024-09-13 ENCOUNTER — OFFICE VISIT (OUTPATIENT)
Dept: PHYSICAL THERAPY | Facility: HOSPITAL | Age: 69
End: 2024-09-13
Attending: FAMILY MEDICINE
Payer: MEDICARE

## 2024-09-13 PROCEDURE — 97140 MANUAL THERAPY 1/> REGIONS: CPT

## 2024-09-13 PROCEDURE — 97110 THERAPEUTIC EXERCISES: CPT

## 2024-09-13 NOTE — PROGRESS NOTES
Diagnosis:   Acute pain of right shoulder (M25.511)  Acute midline low back pain without sciatica (M54.50)      Referring Provider: Lexie Alexander  Date of Evaluation:    7/8/2024    Precautions:  None Next MD visit:   none scheduled  Date of Surgery: n/a   Insurance Primary/Secondary: [employer] [facility] / N/A     # Auth Visits: 16           Subjective: Pt doing well today, continues to see small improvements in range of motion each day, no pain.     Pain: 0/10       Objective:   9/11/2024  - R IR AROM: T10    9/6/2024 - Reassessment   Cervical Screen: R rotation 70 deg, R side bending 25 deg, all others WNL     AROM: (* denotes performed with pain)  Shoulder Initial Eval Shoulder 8/14 Shoulder 9/6/2024 Elbow   Flexion: R 135; L WNL  Abduction: R 155; L WNL  ER: R T2; L T2  IR: R L4*; L T8 Flexion: R 150 deg  Abduction: R 155 deg   ER: R T2  IR: R L4* Flexion: R 155 deg   Abduction: R 160 deg  ER: T2 (77 deg at side)   IR: L1 (60 deg in 90/90 supine)  Flexion: R WNL; L WNL  Extension: R WNl; L WNL         Accessory motion:   Posterior GH glide: WFL R  Inferior GH glide: WFL R  Lateral GH glide: WFL R     Flexibility: increased tension to bilateral upper traps     Strength/MMT: (* denotes performed with pain)  Shoulder Elbow Wrist   Flexion: R 5/5; L 5/5  Abduction: R 5/5; L 5/5  ER: R 5/5; L 5/5  IR: R 5/5; L 5/5 Flexion: R 5/5; L 5/5  Extension: R 5/5; L 5/5    Wrist extension: R 4+/5; L 5/5  Wrist flexion: R 5/5; L 5/5      Special tests:   Hawkin's Edil: negative  Neer's negative  Drop arm: negative   Load and shift: negative  Biceps load II: negative   Bear hug: negative   Empty can/full can: negative   Biceps tendon: negative     Assessment: Pt continues to maintain improved AROM in shoulder IR. Slowly reintroducing rotator cuff strengthening into session for improved shoulder strength and mechanics. Pt able to complete with min cues for form, no pain just fatigue symptoms. Updated HEP to include rotator cuff  strengthening. Discussed with pt plan of care moving forward of 1x per week with continuation of HEP at home. Pt agreeable to plan to work towards goals for discharge.        Goals:   (to be met in 10 visits)   - Pt will have full R shoulder flexion to be able to reach into overhead cabinet without pain. MET  - Pt will have WFL R shoulder IR to be able to complete ADL's without pain. Progressing, 9/6/2024  - Pt will have WNL upper trap tension to decrease stress on shoulder.Progressing, 9/6/2024  - Pt will be independent with comprehensive HEP to maintain gains made in therapy. Progressing, 9/6/2024    Plan: Continue per plan of care.     Date: 8/21/2024  Tx#: 7/10 Date: 8/30/2024  Tx#: 8/10 9/4/2024   TX# 9/10  Date: 9/6/2024  Tx#: 10/10 Date: 9/11/2024  Tx#: 11/16 Date: 9/13/2024  Tx#: 12/16   Therex: 23 min  UE bike, 2'/2' fwd/bkwd  Open book, x20 R/L  ER/IR with red band, 2x10 each   Resisted shoulder flexion, red band, 2x10 R/L   Bicep curls 5#, 2x10   Neutral  bicep curls, 5#, 2x10     Wall walks with yellow band, 2x10 R/L  Shoulder flexion stretch with TRX bands, 5x10 sec holds Therex: 15 min  UE bike, 2'/2' fwd/bkwd  Sleeper stretch, 2x30 sec  Standing L stretch with twist, 3x10 sec  IR with pulley's 2 min Therex 20 mins  Subjective history  UE bike, 2'/2' fwd/bkwd  Pulleys x 2 min IR  Resisted IR / ER walkouts - next visit   Scap retractions x 20         Therex: 20 min  UE bike, 2'/2' fwd/bkwd  Reassessment above in objective  Low cross body stretch, 3x30 sec   Standing row, green, 3x10 R/L   Wall walks with yellow band, 2x10 R/L  Bilateral shoulder ER, red band, 2x10  Bicep curl into OH press, #4, 2x5  Therex: 25 min  UE bike, 2'/2' fwd/bkwd  Pulley's IR, 2 min  Standing row, green, 3x10 R/L   Wall walks with yellow band, 2x10 R/L  ER/IR with red band, 2x10 each R/L   Bicep curl into OH press, #5, 3x5  Therex: 25 min  UE bike, 2'/2' fwd/bkwd  Pulley's IR, 2 min  ER/IR with red band, 2x10 each R/L    Shoulder ext with red band, 2x10   Wall walks with yellow band, 2x10 R/L  Supinated bicep curls, #5, 2x10  Hammer curls, #5, 2x10    Manual: 20 min  Posterior GH glide, gr III-IV, 2x10  Lateral GH glide, gr III-IV, 2x10  STM and release to subscapularis muscle on the R  PROM shoulder all directions  Manual: 25 min  Posterior GH glide, gr III-IV, 2x10  Lateral GH glide, gr III-IV, 2x10  Inferior GH glide in 120 deg of scaption, gr III, multiple bouts   STM and release to subscapularis and teres major muscle on the R  PROM shoulder IR/ER in 90/90   Manual: 25 min  Posterior GH glide, gr III-IV, 2x10  Lateral GH glide, gr III-IV, 2x10  Inferior GH glide in 120 deg of scaption, gr III, multiple bouts   STM and release to subscapularis and teres major muscle on the R  PROM shoulder IR/ER in 90/90 Manual: 25 min  STM and release to subscapularis and teres major muscle on the R  PROM shoulder IR/ER in 90/90  Hawk  to middle deltoid, biceps tendon and lateral shoulder Manual: 18 min  STM upper traps and cervical paraspinals  PROM shoulder IR/ER in 90/90  Lateral/inferior GH glides, gr IV, multiple bouts each way Manual: 15 min  STM upper traps and cervical paraspinals  PROM shoulder IR/ER in 90/90  Lateral/inferior GH glides, gr IV, multiple bouts each way                   HEP:   Access Code: [email or web address]  URL: [email or web address]  Date: 09/13/2024  Prepared by: Allegra Delgado    Exercises  - Standing Shoulder Row with Anchored Resistance  - 1 x daily - 7 x weekly - 2 sets - 10 reps  - Shoulder ER Stretch in Abduction  - 1-2 x daily - 7 x weekly - 1 sets - 3 reps - 15 sec hold  - Seated Cervical Sidebending Stretch  - 1-2 x daily - 7 x weekly - 1 sets - 3 reps - 15 sec hold  - Sleeper Stretch  - 1 x daily - 7 x weekly - 1 sets - 5 reps - 15 hold  - Standing 'L' Stretch at Counter  - 1 x daily - 7 x weekly - 1 sets - 5 reps - 15 sec hold  - Standing Shoulder Internal Rotation Stretch with Towel  - 1 x  daily - 7 x weekly - 2 sets - 10 reps  - Latissimus Dorsi Stretch at Wall  - 1 x daily - 7 x weekly - 2 sets - 10 reps  - Shoulder External Rotation and Scapular Retraction with Resistance  - 1 x daily - 7 x weekly - 2 sets - 10 reps  - Shoulder extension with resistance - Neutral  - 1 x daily - 7 x weekly - 2 sets - 10 reps    Access Code: [email or web address]  URL: [email or web address]  Date: 07/24/2024  Prepared by: Allegra Delgado    Exercises  - Supine Lower Trunk Rotation  - 1 x daily - 7 x weekly - 3 sets - 10 reps  - Supine Bridge  - 1 x daily - 7 x weekly - 2 sets - 10 reps    Charges: TE 2 Manual 1       Total Timed Treatment: 40 min  Total Treatment Time: 40 min

## 2024-09-17 ENCOUNTER — APPOINTMENT (OUTPATIENT)
Dept: PHYSICAL THERAPY | Facility: HOSPITAL | Age: 69
End: 2024-09-17
Attending: FAMILY MEDICINE
Payer: MEDICARE

## 2024-09-19 ENCOUNTER — APPOINTMENT (OUTPATIENT)
Dept: PHYSICAL THERAPY | Facility: HOSPITAL | Age: 69
End: 2024-09-19
Attending: FAMILY MEDICINE
Payer: MEDICARE

## 2024-09-19 ENCOUNTER — PATIENT MESSAGE (OUTPATIENT)
Dept: FAMILY MEDICINE CLINIC | Facility: CLINIC | Age: 69
End: 2024-09-19

## 2024-09-19 NOTE — TELEPHONE ENCOUNTER
From: Jennifer Landeros  To: Lexie Alexander  Sent: 9/19/2024 7:39 AM CDT  Subject: Vaccines     Does your office have the flu or Covid vaccines. I have an appt in early October and wanted to know if I could get them there. If not when do you suggest getting them.

## 2024-09-20 ENCOUNTER — OFFICE VISIT (OUTPATIENT)
Dept: PHYSICAL THERAPY | Facility: HOSPITAL | Age: 69
End: 2024-09-20
Attending: FAMILY MEDICINE
Payer: MEDICARE

## 2024-09-20 PROCEDURE — 97140 MANUAL THERAPY 1/> REGIONS: CPT

## 2024-09-20 PROCEDURE — 97110 THERAPEUTIC EXERCISES: CPT

## 2024-09-20 NOTE — PROGRESS NOTES
Diagnosis:   Acute pain of right shoulder (M25.511)  Acute midline low back pain without sciatica (M54.50)      Referring Provider: Lexie Alexander  Date of Evaluation:    7/8/2024    Precautions:  None Next MD visit:   none scheduled  Date of Surgery: n/a   Insurance Primary/Secondary: [employer]ET[employer] [facility] / N/A     # Auth Visits: 16           Subjective: Pt reports that she is much better with day to day activities. Still feels some limitations when stretching side to side.     Pain: 0/10       Objective:   9/11/2024  - R IR AROM: T10    9/6/2024 - Reassessment   Cervical Screen: R rotation 70 deg, R side bending 25 deg, all others WNL     AROM: (* denotes performed with pain)  Shoulder Initial Eval Shoulder 8/14 Shoulder 9/6/2024 Elbow   Flexion: R 135; L WNL  Abduction: R 155; L WNL  ER: R T2; L T2  IR: R L4*; L T8 Flexion: R 150 deg  Abduction: R 155 deg   ER: R T2  IR: R L4* Flexion: R 155 deg   Abduction: R 160 deg  ER: T2 (77 deg at side)   IR: L1 (60 deg in 90/90 supine)  Flexion: R WNL; L WNL  Extension: R WNl; L WNL         Accessory motion:   Posterior GH glide: WFL R  Inferior GH glide: WFL R  Lateral GH glide: WFL R     Flexibility: increased tension to bilateral upper traps     Strength/MMT: (* denotes performed with pain)  Shoulder Elbow Wrist   Flexion: R 5/5; L 5/5  Abduction: R 5/5; L 5/5  ER: R 5/5; L 5/5  IR: R 5/5; L 5/5 Flexion: R 5/5; L 5/5  Extension: R 5/5; L 5/5    Wrist extension: R 4+/5; L 5/5  Wrist flexion: R 5/5; L 5/5      Special tests:   Hawkin's Edil: negative  Neer's negative  Drop arm: negative   Load and shift: negative  Biceps load II: negative   Bear hug: negative   Empty can/full can: negative   Biceps tendon: negative     Assessment: Pt able to continue with resisted exercises for rotator cuff strengthening without adverse reaction. Cues provide during exercise to decrease upper trap recruitment and for postural awareness. No pain with activities, only muscle fatigue. Pt has improving  end feel with passive ER/IR, still slight tension in IR however much improved overall. Excellent progress towards goals for PT.       Goals:   (to be met in 10 visits)   - Pt will have full R shoulder flexion to be able to reach into overhead cabinet without pain. MET  - Pt will have WFL R shoulder IR to be able to complete ADL's without pain. Progressing, 9/6/2024  - Pt will have WNL upper trap tension to decrease stress on shoulder.Progressing, 9/6/2024  - Pt will be independent with comprehensive HEP to maintain gains made in therapy. Progressing, 9/6/2024    Plan: Continue per plan of care.     Date: 8/21/2024  Tx#: 7/10 Date: 8/30/2024  Tx#: 8/10 9/4/2024   TX# 9/10  Date: 9/6/2024  Tx#: 10/10 Date: 9/11/2024  Tx#: 11/16 Date: 9/13/2024  Tx#: 12/16 Date: 9/20/2024  Tx#: 13/16   Therex: 23 min  UE bike, 2'/2' fwd/bkwd  Open book, x20 R/L  ER/IR with red band, 2x10 each   Resisted shoulder flexion, red band, 2x10 R/L   Bicep curls 5#, 2x10   Neutral  bicep curls, 5#, 2x10     Wall walks with yellow band, 2x10 R/L  Shoulder flexion stretch with TRX bands, 5x10 sec holds Therex: 15 min  UE bike, 2'/2' fwd/bkwd  Sleeper stretch, 2x30 sec  Standing L stretch with twist, 3x10 sec  IR with pulley's 2 min Therex 20 mins  Subjective history  UE bike, 2'/2' fwd/bkwd  Pulleys x 2 min IR  Resisted IR / ER walkouts - next visit   Scap retractions x 20         Therex: 20 min  UE bike, 2'/2' fwd/bkwd  Reassessment above in objective  Low cross body stretch, 3x30 sec   Standing row, green, 3x10 R/L   Wall walks with yellow band, 2x10 R/L  Bilateral shoulder ER, red band, 2x10  Bicep curl into OH press, #4, 2x5  Therex: 25 min  UE bike, 2'/2' fwd/bkwd  Pulley's IR, 2 min  Standing row, green, 3x10 R/L   Wall walks with yellow band, 2x10 R/L  ER/IR with red band, 2x10 each R/L   Bicep curl into OH press, #5, 3x5  Therex: 25 min  UE bike, 2'/2' fwd/bkwd  Pulley's IR, 2 min  ER/IR with red band, 2x10 each R/L   Shoulder ext  with red band, 2x10   Wall walks with yellow band, 2x10 R/L  Supinated bicep curls, #5, 2x10  Hammer curls, #5, 2x10  Therex: 25 min  UE bike, 2'/2' fwd/bkwd  Wall walks with yellow band, 2x10 R/L  Chest press, supine, 5#, 3x10   Resisted abduction, red band, 2x10   Wall push up, 2x10   Tricep OH ext, #7, 2x10    Manual: 20 min  Posterior GH glide, gr III-IV, 2x10  Lateral GH glide, gr III-IV, 2x10  STM and release to subscapularis muscle on the R  PROM shoulder all directions  Manual: 25 min  Posterior GH glide, gr III-IV, 2x10  Lateral GH glide, gr III-IV, 2x10  Inferior GH glide in 120 deg of scaption, gr III, multiple bouts   STM and release to subscapularis and teres major muscle on the R  PROM shoulder IR/ER in 90/90   Manual: 25 min  Posterior GH glide, gr III-IV, 2x10  Lateral GH glide, gr III-IV, 2x10  Inferior GH glide in 120 deg of scaption, gr III, multiple bouts   STM and release to subscapularis and teres major muscle on the R  PROM shoulder IR/ER in 90/90 Manual: 25 min  STM and release to subscapularis and teres major muscle on the R  PROM shoulder IR/ER in 90/90  Hawk  to middle deltoid, biceps tendon and lateral shoulder Manual: 18 min  STM upper traps and cervical paraspinals  PROM shoulder IR/ER in 90/90  Lateral/inferior GH glides, gr IV, multiple bouts each way Manual: 15 min  STM upper traps and cervical paraspinals  PROM shoulder IR/ER in 90/90  Lateral/inferior GH glides, gr IV, multiple bouts each way Manual: 15 min  STM upper traps and cervical paraspinals  PROM shoulder IR/ER in 90/90  Lateral/inferior GH glides, gr IV, multiple bouts each way                     HEP:   Access Code: [email or web address]  URL: [email or web address]  Date: 09/13/2024  Prepared by: Allegra Delgado    Exercises  - Standing Shoulder Row with Anchored Resistance  - 1 x daily - 7 x weekly - 2 sets - 10 reps  - Shoulder ER Stretch in Abduction  - 1-2 x daily - 7 x weekly - 1 sets - 3 reps - 15 sec  hold  - Seated Cervical Sidebending Stretch  - 1-2 x daily - 7 x weekly - 1 sets - 3 reps - 15 sec hold  - Sleeper Stretch  - 1 x daily - 7 x weekly - 1 sets - 5 reps - 15 hold  - Standing 'L' Stretch at Counter  - 1 x daily - 7 x weekly - 1 sets - 5 reps - 15 sec hold  - Standing Shoulder Internal Rotation Stretch with Towel  - 1 x daily - 7 x weekly - 2 sets - 10 reps  - Latissimus Dorsi Stretch at Wall  - 1 x daily - 7 x weekly - 2 sets - 10 reps  - Shoulder External Rotation and Scapular Retraction with Resistance  - 1 x daily - 7 x weekly - 2 sets - 10 reps  - Shoulder extension with resistance - Neutral  - 1 x daily - 7 x weekly - 2 sets - 10 reps    Access Code: [email or web address]  URL: [email or web address]  Date: 07/24/2024  Prepared by: Allegra Delgado    Exercises  - Supine Lower Trunk Rotation  - 1 x daily - 7 x weekly - 3 sets - 10 reps  - Supine Bridge  - 1 x daily - 7 x weekly - 2 sets - 10 reps    Charges: TE 2 Manual 1       Total Timed Treatment: 40 min  Total Treatment Time: 40 min

## 2024-09-23 ENCOUNTER — APPOINTMENT (OUTPATIENT)
Dept: PHYSICAL THERAPY | Facility: HOSPITAL | Age: 69
End: 2024-09-23
Attending: FAMILY MEDICINE
Payer: MEDICARE

## 2024-09-25 ENCOUNTER — OFFICE VISIT (OUTPATIENT)
Dept: PHYSICAL THERAPY | Facility: HOSPITAL | Age: 69
End: 2024-09-25
Attending: FAMILY MEDICINE
Payer: MEDICARE

## 2024-09-25 PROCEDURE — 97140 MANUAL THERAPY 1/> REGIONS: CPT | Performed by: PHYSICAL THERAPY ASSISTANT

## 2024-09-25 PROCEDURE — 97110 THERAPEUTIC EXERCISES: CPT | Performed by: PHYSICAL THERAPY ASSISTANT

## 2024-09-25 NOTE — PROGRESS NOTES
Diagnosis:   Acute pain of right shoulder (M25.511)  Acute midline low back pain without sciatica (M54.50)      Referring Provider: No ref. provider found  Date of Evaluation:    7/8/2024    Precautions:  None Next MD visit:   none scheduled  Date of Surgery: n/a   Insurance Primary/Secondary: [employer]ETSELVIN [facility] / N/A     # Auth Visits: 16           Subjective: Pt reports no pain today - will take 1 week break then decide if she wishes to be discharged from therapy.     Pain: 0/10       Objective:   9/11/2024  - R IR AROM: T10    9/6/2024 - Reassessment   Cervical Screen: R rotation 70 deg, R side bending 25 deg, all others WNL     AROM: (* denotes performed with pain)  Shoulder Initial Eval Shoulder 8/14 Shoulder 9/6/2024 Elbow   Flexion: R 135; L WNL  Abduction: R 155; L WNL  ER: R T2; L T2  IR: R L4*; L T8 Flexion: R 150 deg  Abduction: R 155 deg   ER: R T2  IR: R L4* Flexion: R 155 deg   Abduction: R 160 deg  ER: T2 (77 deg at side)   IR: L1 (60 deg in 90/90 supine)  Flexion: R WNL; L WNL  Extension: R WNl; L WNL         Accessory motion:   Posterior GH glide: WFL R  Inferior GH glide: WFL R  Lateral GH glide: WFL R     Flexibility: increased tension to bilateral upper traps     Strength/MMT: (* denotes performed with pain)  Shoulder Elbow Wrist   Flexion: R 5/5; L 5/5  Abduction: R 5/5; L 5/5  ER: R 5/5; L 5/5  IR: R 5/5; L 5/5 Flexion: R 5/5; L 5/5  Extension: R 5/5; L 5/5    Wrist extension: R 4+/5; L 5/5  Wrist flexion: R 5/5; L 5/5      Special tests:   Hawkin's Edil: negative  Neer's negative  Drop arm: negative   Load and shift: negative  Biceps load II: negative   Bear hug: negative   Empty can/full can: negative   Biceps tendon: negative     Assessment: Pt presents with no pain today. States she will have one more session and then discharge but leaving a week long gap. Pt enquired about additional exercises for HEP - stated this would be added at last session if needed but pt HEP very comprehensive at  present. Minimal cueing required for all activities - pt experiences no pain or provocation of symptoms with all PRE. Pt has achieved all goals as set at IE and feels ready for discharge after next session. Pt reported no adverse reaction in response to interventions today. PT remains necessary to address ongoing deficits as identified at initial assessment.        Goals:   (to be met in 10 visits)   - Pt will have full R shoulder flexion to be able to reach into overhead cabinet without pain. MET  - Pt will have WFL R shoulder IR to be able to complete ADL's without pain. 9/25/2024 MET  - Pt will have WNL upper trap tension to decrease stress on shoulder. 9/25/2024 MET  - Pt will be independent with comprehensive HEP to maintain gains made in therapy. 9/25/2024 MET    Plan:  Leave a week between this session and next and then decide if PT and pt will discharge.     9/4/2024   TX# 9/10  Date: 9/6/2024  Tx#: 10/10 Date: 9/11/2024  Tx#: 11/16 Date: 9/13/2024  Tx#: 12/16 Date: 9/20/2024  Tx#: 13/16 9/25/2024   Tx 14/16     Therex 20 mins  Subjective history  UE bike, 2'/2' fwd/bkwd  Pulleys x 2 min IR  Resisted IR / ER walkouts - next visit   Scap retractions x 20         Therex: 20 min  UE bike, 2'/2' fwd/bkwd  Reassessment above in objective  Low cross body stretch, 3x30 sec   Standing row, green, 3x10 R/L   Wall walks with yellow band, 2x10 R/L  Bilateral shoulder ER, red band, 2x10  Bicep curl into OH press, #4, 2x5  Therex: 25 min  UE bike, 2'/2' fwd/bkwd  Pulley's IR, 2 min  Standing row, green, 3x10 R/L   Wall walks with yellow band, 2x10 R/L  ER/IR with red band, 2x10 each R/L   Bicep curl into OH press, #5, 3x5  Therex: 25 min  UE bike, 2'/2' fwd/bkwd  Pulley's IR, 2 min  ER/IR with red band, 2x10 each R/L   Shoulder ext with red band, 2x10   Wall walks with yellow band, 2x10 R/L  Supinated bicep curls, #5, 2x10  Hammer curls, #5, 2x10  Therex: 25 min  UE bike, 2'/2' fwd/bkwd  Wall walks with yellow band, 2x10  R/L  Chest press, supine, 5#, 3x10   Resisted abduction, red band, 2x10   Wall push up, 2x10   Tricep OH ext, #7, 2x10  Ther ex  25   UE bike, 2'/2' fwd/bkwd  PROM all ranges   RS arm at 90 / 90 and shoulder at 90   Sleeper stretch x 20 in R SL   Resisted walk outs w RTB x 10 IR / ER  Push ups on rail 2 x 10   Wall circles with sports ball x 10 CW / CCW  Scapula circles 10 CW / CCW         Manual: 25 min  Posterior GH glide, gr III-IV, 2x10  Lateral GH glide, gr III-IV, 2x10  Inferior GH glide in 120 deg of scaption, gr III, multiple bouts   STM and release to subscapularis and teres major muscle on the R  PROM shoulder IR/ER in 90/90 Manual: 25 min  STM and release to subscapularis and teres major muscle on the R  PROM shoulder IR/ER in 90/90  Hawk  to middle deltoid, biceps tendon and lateral shoulder Manual: 18 min  STM upper traps and cervical paraspinals  PROM shoulder IR/ER in 90/90  Lateral/inferior GH glides, gr IV, multiple bouts each way Manual: 15 min  STM upper traps and cervical paraspinals  PROM shoulder IR/ER in 90/90  Lateral/inferior GH glides, gr IV, multiple bouts each way Manual: 15 min  STM upper traps and cervical paraspinals  PROM shoulder IR/ER in 90/90  Lateral/inferior GH glides, gr IV, multiple bouts each way Manual: 15 min  STM upper traps and cervical paraspinals  PROM shoulder IR/ER in 90/90  Lateral/inferior GH glides, gr IV, multiple bouts each way                   HEP:   Access Code: [email or web address]  URL: [email or web address]  Date: 09/13/2024  Prepared by: Allegra Delgado    Exercises  - Standing Shoulder Row with Anchored Resistance  - 1 x daily - 7 x weekly - 2 sets - 10 reps  - Shoulder ER Stretch in Abduction  - 1-2 x daily - 7 x weekly - 1 sets - 3 reps - 15 sec hold  - Seated Cervical Sidebending Stretch  - 1-2 x daily - 7 x weekly - 1 sets - 3 reps - 15 sec hold  - Sleeper Stretch  - 1 x daily - 7 x weekly - 1 sets - 5 reps - 15 hold  - Standing 'L' Stretch at  Counter  - 1 x daily - 7 x weekly - 1 sets - 5 reps - 15 sec hold  - Standing Shoulder Internal Rotation Stretch with Towel  - 1 x daily - 7 x weekly - 2 sets - 10 reps  - Latissimus Dorsi Stretch at Wall  - 1 x daily - 7 x weekly - 2 sets - 10 reps  - Shoulder External Rotation and Scapular Retraction with Resistance  - 1 x daily - 7 x weekly - 2 sets - 10 reps  - Shoulder extension with resistance - Neutral  - 1 x daily - 7 x weekly - 2 sets - 10 reps    Access Code: [email or web address]  URL: [email or web address]  Date: 07/24/2024  Prepared by: Allegra Delgado    Exercises  - Supine Lower Trunk Rotation  - 1 x daily - 7 x weekly - 3 sets - 10 reps  - Supine Bridge  - 1 x daily - 7 x weekly - 2 sets - 10 reps    Charges: TE 2 Manual 1       Total Timed Treatment: 40 min  Total Treatment Time: 40 min

## 2024-09-27 ENCOUNTER — OFFICE VISIT (OUTPATIENT)
Dept: FAMILY MEDICINE CLINIC | Facility: CLINIC | Age: 69
End: 2024-09-27
Payer: MEDICARE

## 2024-09-27 VITALS
HEART RATE: 72 BPM | SYSTOLIC BLOOD PRESSURE: 128 MMHG | DIASTOLIC BLOOD PRESSURE: 62 MMHG | HEIGHT: 62 IN | WEIGHT: 131 LBS | BODY MASS INDEX: 24.11 KG/M2 | RESPIRATION RATE: 16 BRPM | TEMPERATURE: 97 F

## 2024-09-27 DIAGNOSIS — R68.89 FULLNESS IN HEAD: ICD-10-CM

## 2024-09-27 DIAGNOSIS — H93.8X3 SENSATION OF FULLNESS IN BOTH EARS: ICD-10-CM

## 2024-09-27 DIAGNOSIS — R05.1 ACUTE COUGH: Primary | ICD-10-CM

## 2024-09-27 DIAGNOSIS — J44.89 ASTHMA WITH COPD (CHRONIC OBSTRUCTIVE PULMONARY DISEASE) (HCC): Chronic | ICD-10-CM

## 2024-09-27 DIAGNOSIS — R09.81 CONGESTION OF NASAL SINUS: ICD-10-CM

## 2024-09-27 PROCEDURE — 99213 OFFICE O/P EST LOW 20 MIN: CPT

## 2024-09-27 RX ORDER — METHYLPREDNISOLONE 4 MG
TABLET, DOSE PACK ORAL
Qty: 1 EACH | Refills: 0 | Status: SHIPPED | OUTPATIENT
Start: 2024-09-27

## 2024-09-27 NOTE — PROGRESS NOTES
Subjective:   Jennifer Landeros is a 69 year old female who presents for cough, congestion.     Day of symptom onset: 10 days ago  Fever: no   Symptoms:   Any GI symptoms: acid reflux  Any shortness of breath or chest pain: no   Taking OTC: yesterday took Delsum, today took Sudafed  Sick contact: no   Any severe hepatic or renal impairment:    Check other med interactions:     Has not done a covid test and doesn't want one. Declines quad screen test.   Has a reactive asthma when she gets sick.   Takes Allegra for years.       History/Other:    Chief Complaint Reviewed and Verified  Nursing Notes Reviewed and   Verified  Tobacco Reviewed  Allergies Reviewed  Medications Reviewed    Medical History Reviewed  Surgical History Reviewed  OB Status Reviewed    Family History Reviewed         Tobacco:  She has never smoked tobacco.    Current Outpatient Medications   Medication Sig Dispense Refill    methylPREDNISolone 4 MG Oral Tablet Therapy Pack Take as directed 1 each 0    cimetidine 300 MG Oral Tab Take 2 tablets (600 mg total) by mouth 2 (two) times daily. 360 tablet 1    atorvastatin 10 MG Oral Tab Take 1 tablet (10 mg total) by mouth nightly. 90 tablet 1    spironolactone 25 MG Oral Tab Take 1 tablet (25 mg total) by mouth daily. 90 tablet 1    albuterol 108 (90 Base) MCG/ACT Inhalation Aero Soln Inhale 2 puffs into the lungs every 4 to 6 hours as needed for Wheezing. 1 each 0    Magnesium 125 MG Oral Cap       Fexofenadine HCl 180 MG Oral Tab Take 1 tablet (180 mg total) by mouth daily as needed.      ValACYclovir HCl 1 G Oral Tab Take 2 tablets by mouth twice a day as directed,  for outbeaks 16 tablet 1    Cholecalciferol (VITAMIN D) 2000 UNITS Oral Cap Take 1 capsule (2,000 Units total) by mouth daily.      Balsalazide Disodium (COLAZAL) 750 MG Oral Cap 3 capsules (2,250 mg total) daily.  3         Review of Systems:  Review of Systems   Constitutional: Negative.  Negative for fatigue and fever.   HENT:   Positive for ear pain and postnasal drip. Negative for sore throat.    Eyes: Negative.    Respiratory:  Positive for cough. Negative for shortness of breath.    Cardiovascular:  Negative for chest pain.   Gastrointestinal: Negative.    Endocrine: Negative.    Genitourinary: Negative.    Musculoskeletal: Negative.    Skin:         R 4th finger bruised   Allergic/Immunologic: Negative.    Neurological: Negative.    Hematological: Negative.    Psychiatric/Behavioral: Negative.           Objective:   /62   Pulse 72   Temp 97 °F (36.1 °C) (Temporal)   Resp 16   Ht 5' 2\" (1.575 m)   Wt 131 lb (59.4 kg)   BMI 23.96 kg/m²  Estimated body mass index is 23.96 kg/m² as calculated from the following:    Height as of this encounter: 5' 2\" (1.575 m).    Weight as of this encounter: 131 lb (59.4 kg).  Physical Exam  Constitutional:       General: She is not in acute distress.     Appearance: Normal appearance. She is normal weight. She is not ill-appearing, toxic-appearing or diaphoretic.   HENT:      Head: Normocephalic and atraumatic.      Right Ear: Tympanic membrane and ear canal normal. There is no impacted cerumen.      Left Ear: Tympanic membrane, ear canal and external ear normal. There is no impacted cerumen.      Ears:      Comments: R outer ear canal erythematous     Nose:      Comments: Nares swollen and erythematous     Mouth/Throat:      Mouth: Mucous membranes are moist.      Pharynx: Posterior oropharyngeal erythema present. No oropharyngeal exudate.   Eyes:      General: No scleral icterus.        Right eye: No discharge.         Left eye: No discharge.      Conjunctiva/sclera: Conjunctivae normal.   Cardiovascular:      Rate and Rhythm: Normal rate and regular rhythm.      Pulses: Normal pulses.      Heart sounds: Normal heart sounds. No murmur heard.     No friction rub. No gallop.   Pulmonary:      Effort: Pulmonary effort is normal. No respiratory distress.      Breath sounds: Normal breath  sounds. No stridor. No wheezing, rhonchi or rales.   Chest:      Chest wall: No tenderness.   Musculoskeletal:      Cervical back: Normal range of motion. No rigidity or tenderness.   Lymphadenopathy:      Cervical: No cervical adenopathy.   Skin:     General: Skin is warm and dry.   Neurological:      General: No focal deficit present.      Mental Status: She is alert and oriented to person, place, and time.   Psychiatric:         Mood and Affect: Mood normal.         Behavior: Behavior normal.         Thought Content: Thought content normal.         Judgment: Judgment normal.         Assessment & Plan:   1. Acute cough (Primary)  2. Congestion of nasal sinus  3. Sensation of fullness in both ears  4. Fullness in head  5. Asthma with COPD (chronic obstructive pulmonary disease) (HCC)  Other orders  -     methylPREDNISolone; Take as directed  Dispense: 1 each; Refill: 0    Take the medrol dosepak as directed  Continue antihistamine; consider switching from allegra to xyzal, zyrtec, or claritin. Can continue this daily.   Can continue with sudafed if it helps.   Can use OTC Flonase, Robitussin or Delsum.       Return in about 11 days (around 10/8/2024).  Keep OV 10/8/24 for appt with Dr Catherine Velasquez, APRN, 9/27/2024, 10:19 AM

## 2024-09-27 NOTE — PATIENT INSTRUCTIONS
Take the medrol dosepak as directed  Continue antihistamine; consider switching from allegra to xyzal, zyrtec, or claritin. Can continue this daily.   Can continue with sudafed if it helps.   Can use OTC Flonase, Robitussin or Delsum.

## 2024-10-07 ENCOUNTER — OFFICE VISIT (OUTPATIENT)
Dept: PHYSICAL THERAPY | Facility: HOSPITAL | Age: 69
End: 2024-10-07
Attending: FAMILY MEDICINE
Payer: MEDICARE

## 2024-10-07 PROCEDURE — 97110 THERAPEUTIC EXERCISES: CPT

## 2024-10-07 NOTE — PROGRESS NOTES
Diagnosis:   Acute pain of right shoulder (M25.511)  Acute midline low back pain without sciatica (M54.50)      Referring Provider: No ref. provider found  Date of Evaluation:    7/8/2024    Precautions:  None Next MD visit:   none scheduled  Date of Surgery: n/a   Insurance Primary/Secondary: AEDILIP [facility] / N/A     # Auth Visits: 16          Discharge Summary  Pt has attended 15 visits in Physical Therapy.      Subjective: Pt is doing well today, feels like the shoulder is pretty much back to normal. Wants to review exercises to continue and what to do if this happens again.     Pain: 0/10       Objective:      AROM: (* denotes performed with pain)  Shoulder Initial Eval Shoulder 8/14 Shoulder 9/6/2024 10/7/2024 Elbow   Flexion: R 135; L WNL  Abduction: R 155; L WNL  ER: R T2; L T2  IR: R L4*; L T8 Flexion: R 150 deg  Abduction: R 155 deg   ER: R T2  IR: R L4* Flexion: R 155 deg   Abduction: R 160 deg  ER: T2 (77 deg at side)   IR: L1 (60 deg in 90/90 supine)  Flexion: R 165 deg   Abduction: R 165 deg   ER: T3, 75 deg  IR: T10, 80 deg   Flexion: R WNL; L WNL  Extension: R WNl; L WNL         Accessory motion:   Posterior GH glide: WFL R  Inferior GH glide: WFL R  Lateral GH glide: WFL R     Flexibility: increased tension to bilateral upper traps     Strength/MMT: (* denotes performed with pain)  Shoulder Elbow Wrist   Flexion: R 5/5; L 5/5  Abduction: R 5/5; L 5/5  ER: R 5/5; L 5/5  IR: R 5/5; L 5/5 Flexion: R 5/5; L 5/5  Extension: R 5/5; L 5/5    Wrist extension: R 4+/5; L 5/5  Wrist flexion: R 5/5; L 5/5      Special tests:   Hawkin's Edil: negative  Neer's negative  Drop arm: negative   Load and shift: negative  Biceps load II: negative   Bear hug: negative   Empty can/full can: negative   Biceps tendon: negative     Assessment: Pt has attended 15 visits in outpatient physical therapy for treatment of frozen shoulder. In this time, pt has been able to achieve all goals set with PT intervention as well as  injection from Dr. Dick to relieve ongoing symptoms. Pt has restored equal and full AROM of the right shoulder and is pain free. Reviewed with pt HEP to continue 3x per week to prevent future occurrence of frozen shoulder and strengthening of rotator cuff. Also discussed strategies for low back pain prevention and proper form with exercises. Pt to discharge from skilled therapy at this time, continuing at home with HEP.       Goals:   (to be met in 10 visits)   - Pt will have full R shoulder flexion to be able to reach into overhead cabinet without pain. MET  - Pt will have WFL R shoulder IR to be able to complete ADL's without pain. 9/25/2024 MET  - Pt will have WNL upper trap tension to decrease stress on shoulder. 9/25/2024 MET  - Pt will be independent with comprehensive HEP to maintain gains made in therapy. 9/25/2024 MET    QuickDASH Outcome Score  Score: 18.18 % (7/5/2024  3:03 PM)    Post QuickDASH Outcome Score  Post Score: 4.55 % (10/7/2024  8:16 AM)    13.63 % improvement    Plan: Discharge     Patient/Family/Caregiver was advised of these findings, precautions, and treatment options and has agreed to actively participate in planning and for this course of care.    Thank you for your referral. If you have any questions, please contact me at Dept: [phone].    Sincerely,  Electronically signed by therapist: Allegra Delgado PT, DPT    Certification From: 10/7/2024  To:1/5/2025 9/4/2024   TX# 9/10  Date: 9/6/2024  Tx#: 10/10 Date: 9/11/2024  Tx#: 11/16 Date: 9/13/2024  Tx#: 12/16 Date: 9/20/2024  Tx#: 13/16 9/25/2024   Tx 14/16   10/7/2024  Tx#: 15/16   Therex 20 mins  Subjective history  UE bike, 2'/2' fwd/bkwd  Pulleys x 2 min IR  Resisted IR / ER walkouts - next visit   Scap retractions x 20         Therex: 20 min  UE bike, 2'/2' fwd/bkwd  Reassessment above in objective  Low cross body stretch, 3x30 sec   Standing row, green, 3x10 R/L   Wall walks with yellow band, 2x10 R/L  Bilateral shoulder ER,  red band, 2x10  Bicep curl into OH press, #4, 2x5  Therex: 25 min  UE bike, 2'/2' fwd/bkwd  Pulley's IR, 2 min  Standing row, green, 3x10 R/L   Wall walks with yellow band, 2x10 R/L  ER/IR with red band, 2x10 each R/L   Bicep curl into OH press, #5, 3x5  Therex: 25 min  UE bike, 2'/2' fwd/bkwd  Pulley's IR, 2 min  ER/IR with red band, 2x10 each R/L   Shoulder ext with red band, 2x10   Wall walks with yellow band, 2x10 R/L  Supinated bicep curls, #5, 2x10  Hammer curls, #5, 2x10  Therex: 25 min  UE bike, 2'/2' fwd/bkwd  Wall walks with yellow band, 2x10 R/L  Chest press, supine, 5#, 3x10   Resisted abduction, red band, 2x10   Wall push up, 2x10   Tricep OH ext, #7, 2x10  Ther ex  25   UE bike, 2'/2' fwd/bkwd  PROM all ranges   RS arm at 90 / 90 and shoulder at 90   Sleeper stretch x 20 in R SL   Resisted walk outs w RTB x 10 IR / ER  Push ups on rail 2 x 10   Wall circles with sports ball x 10 CW / CCW  Scapula circles 10 CW / CCW       Ther ex:  35  UE bike, 2'/2' fwd/bkwd  Reassessment above in objective   Rows with blue band, 3x10   Wall push up, 2x10   Wall walks with yellow band, 2x10   Supine ER/IR with red band in 90/90, 2x10 each  Review of low back exercises below  Pt education: what to do if frozen shoulder starts to come back, continue with exercises 3x per week    Manual: 25 min  Posterior GH glide, gr III-IV, 2x10  Lateral GH glide, gr III-IV, 2x10  Inferior GH glide in 120 deg of scaption, gr III, multiple bouts   STM and release to subscapularis and teres major muscle on the R  PROM shoulder IR/ER in 90/90 Manual: 25 min  STM and release to subscapularis and teres major muscle on the R  PROM shoulder IR/ER in 90/90  Hawk  to middle deltoid, biceps tendon and lateral shoulder Manual: 18 min  STM upper traps and cervical paraspinals  PROM shoulder IR/ER in 90/90  Lateral/inferior GH glides, gr IV, multiple bouts each way Manual: 15 min  STM upper traps and cervical paraspinals  PROM shoulder IR/ER  in 90/90  Lateral/inferior GH glides, gr IV, multiple bouts each way Manual: 15 min  STM upper traps and cervical paraspinals  PROM shoulder IR/ER in 90/90  Lateral/inferior GH glides, gr IV, multiple bouts each way Manual: 15 min  STM upper traps and cervical paraspinals  PROM shoulder IR/ER in 90/90  Lateral/inferior GH glides, gr IV, multiple bouts each way                      HEP:   Access Code: [email or web address]  URL: [email or web address]  Date: 09/13/2024  Prepared by: Allegra Hedin    Exercises  - Standing Shoulder Row with Anchored Resistance  - 1 x daily - 7 x weekly - 2 sets - 10 reps  - Shoulder ER Stretch in Abduction  - 1-2 x daily - 7 x weekly - 1 sets - 3 reps - 15 sec hold  - Seated Cervical Sidebending Stretch  - 1-2 x daily - 7 x weekly - 1 sets - 3 reps - 15 sec hold  - Sleeper Stretch  - 1 x daily - 7 x weekly - 1 sets - 5 reps - 15 hold  - Standing 'L' Stretch at Counter  - 1 x daily - 7 x weekly - 1 sets - 5 reps - 15 sec hold  - Standing Shoulder Internal Rotation Stretch with Towel  - 1 x daily - 7 x weekly - 2 sets - 10 reps  - Latissimus Dorsi Stretch at Wall  - 1 x daily - 7 x weekly - 2 sets - 10 reps  - Shoulder External Rotation and Scapular Retraction with Resistance  - 1 x daily - 7 x weekly - 2 sets - 10 reps  - Shoulder extension with resistance - Neutral  - 1 x daily - 7 x weekly - 2 sets - 10 reps    Access Code: [email or web address]  URL: [email or web address]  Date: 10/07/2024  Prepared by: Allegra Hedin    Exercises  - Supine Lower Trunk Rotation  - 1 x daily - 7 x weekly - 3 sets - 10 reps  - Supine Bridge  - 1 x daily - 7 x weekly - 2 sets - 10 reps  - Supine Single Knee to Chest Stretch  - 1 x daily - 7 x weekly - 2 sets - 10 reps  - Supine Double Knee to Chest  - 1 x daily - 7 x weekly - 2 sets - 10 reps    Charges: TE 2     Total Timed Treatment: 35 min  Total Treatment Time: 35 min

## 2024-10-08 ENCOUNTER — OFFICE VISIT (OUTPATIENT)
Dept: FAMILY MEDICINE CLINIC | Facility: CLINIC | Age: 69
End: 2024-10-08
Payer: MEDICARE

## 2024-10-08 VITALS
BODY MASS INDEX: 24.48 KG/M2 | HEIGHT: 62 IN | SYSTOLIC BLOOD PRESSURE: 110 MMHG | WEIGHT: 133 LBS | HEART RATE: 79 BPM | DIASTOLIC BLOOD PRESSURE: 70 MMHG | RESPIRATION RATE: 16 BRPM | OXYGEN SATURATION: 100 %

## 2024-10-08 DIAGNOSIS — K21.9 GASTROESOPHAGEAL REFLUX DISEASE, UNSPECIFIED WHETHER ESOPHAGITIS PRESENT: ICD-10-CM

## 2024-10-08 DIAGNOSIS — Z12.11 SCREENING FOR MALIGNANT NEOPLASM OF COLON: ICD-10-CM

## 2024-10-08 DIAGNOSIS — M25.511 RIGHT SHOULDER PAIN, UNSPECIFIED CHRONICITY: ICD-10-CM

## 2024-10-08 DIAGNOSIS — E78.5 DYSLIPIDEMIA: ICD-10-CM

## 2024-10-08 DIAGNOSIS — I10 ESSENTIAL HYPERTENSION: ICD-10-CM

## 2024-10-08 DIAGNOSIS — F43.0 STRESS REACTION: ICD-10-CM

## 2024-10-08 DIAGNOSIS — Z79.899 MEDICATION MANAGEMENT: ICD-10-CM

## 2024-10-08 DIAGNOSIS — Z71.85 VACCINE COUNSELING: ICD-10-CM

## 2024-10-08 DIAGNOSIS — J44.89 ASTHMA WITH COPD (CHRONIC OBSTRUCTIVE PULMONARY DISEASE) (HCC): ICD-10-CM

## 2024-10-08 DIAGNOSIS — R09.81 CONGESTION OF NASAL SINUS: Primary | ICD-10-CM

## 2024-10-08 PROCEDURE — 99215 OFFICE O/P EST HI 40 MIN: CPT | Performed by: FAMILY MEDICINE

## 2024-10-08 NOTE — PROGRESS NOTES
Jennifer Landeros is a 69 year old female.  HPI:   Pt. Is here for follow up on her right shoulder pain and stress.  Right shoulder -- had injection and did PT and it helped and no more pain.  Saw Sindhu and started 10 days -- full head and runny itch eyes and cough.  Placed on prednisone and whole dose used to stop cough.  Basically better.  Noted fullness in her head and ears yesterday.  Also has acid reflux cough and asthma cough.  Wants to make sure all is ok.  Pinched her finger -- right 4th digit with band and it was purple and now healed but notes white tips since being white.  Daughter is still not pregnant and trying for about 1 year.  Son living with  wife.        Current Outpatient Medications   Medication Sig Dispense Refill    methylPREDNISolone 4 MG Oral Tablet Therapy Pack Take as directed 1 each 0    cimetidine 300 MG Oral Tab Take 2 tablets (600 mg total) by mouth 2 (two) times daily. 360 tablet 1    atorvastatin 10 MG Oral Tab Take 1 tablet (10 mg total) by mouth nightly. 90 tablet 1    spironolactone 25 MG Oral Tab Take 1 tablet (25 mg total) by mouth daily. 90 tablet 1    albuterol 108 (90 Base) MCG/ACT Inhalation Aero Soln Inhale 2 puffs into the lungs every 4 to 6 hours as needed for Wheezing. 1 each 0    Magnesium 125 MG Oral Cap       Fexofenadine HCl 180 MG Oral Tab Take 1 tablet (180 mg total) by mouth daily as needed.      ValACYclovir HCl 1 G Oral Tab Take 2 tablets by mouth twice a day as directed,  for outbeaks 16 tablet 1    Cholecalciferol (VITAMIN D) 2000 UNITS Oral Cap Take 1 capsule (2,000 Units total) by mouth daily.      Balsalazide Disodium (COLAZAL) 750 MG Oral Cap 3 capsules (2,250 mg total) daily.  3     No current facility-administered medications for this visit.      Allergies   Allergen Reactions    Seasonal       Past Medical History:    Anemia     delivery, without mention of indication, unspecified as to episode of care(847.18)    Elevated blood pressure  reading without diagnosis of hypertension    Esophageal reflux    Palpitations    Personal history of urinary (tract) infection    Unspecified asthma(493.90)    Unspecified sinusitis (chronic)      Social History:  Social History     Socioeconomic History    Marital status:    Tobacco Use    Smoking status: Never    Smokeless tobacco: Never   Vaping Use    Vaping status: Never Used   Substance and Sexual Activity    Alcohol use: Yes     Alcohol/week: 0.0 standard drinks of alcohol     Comment: wine 3 nights a week     Drug use: No    Sexual activity: Yes   Other Topics Concern    Caffeine Concern Yes     Comment: coffee in am     Exercise Yes     Comment: daily         Results for orders placed or performed in visit on 06/17/24   Basic Metabolic Panel (8)   Result Value Ref Range    Glucose 93 70 - 99 mg/dL    Sodium 138 136 - 145 mmol/L    Potassium 4.0 3.5 - 5.1 mmol/L    Chloride 106 98 - 112 mmol/L    CO2 28.0 21.0 - 32.0 mmol/L    Anion Gap 4 0 - 18 mmol/L    BUN 14 9 - 23 mg/dL    Creatinine 0.95 0.55 - 1.02 mg/dL    Calcium, Total 9.2 8.5 - 10.1 mg/dL    Calculated Osmolality 286 275 - 295 mOsm/kg    eGFR-Cr 66 >=60 mL/min/1.73m2    Patient Fasting for BMP? Yes        REVIEW OF SYSTEMS:   GENERAL: feels well otherwise  HEENT: sinus congestion  SKIN: denies any unusual skin lesions  LUNGS: denies shortness of breath with exertion  CARDIOVASCULAR: denies chest pain on exertion  GI: denies abdominal pain,denies heartburn  MUSCULOSKELETAL: denies back pain  EXTREMITIES:  No pain or numbness    EXAM:   /70 (BP Location: Left arm, Patient Position: Sitting, Cuff Size: adult)   Pulse 79   Resp 16   Ht 5' 2\" (1.575 m)   Wt 133 lb (60.3 kg)   SpO2 100%   BMI 24.33 kg/m²   GENERAL: well developed, well nourished,in no apparent distress  SKIN: no rashes,no suspicious lesions  HEENT: atraumatic, normocephalic,ears and throat are clear; swollen turbs with mild congestion  NECK: supple,post. Cervical  right greater than left fullness; no adenopathy,no bruits  LUNGS: clear to auscultation  CARDIO: RRR without murmur  GI: soft, good BS's  EXTREMITIES: no cyanosis, clubbing or edema    ASSESSMENT AND PLAN:     Encounter Diagnoses   Name Primary?    Congestion of nasal sinus Yes    Right shoulder pain, unspecified chronicity     Screening for malignant neoplasm of colon     Essential hypertension     Dyslipidemia     Asthma with COPD (chronic obstructive pulmonary disease) (HCC)     Stress reaction     Gastroesophageal reflux disease, unspecified whether esophagitis present     Medication management     Vaccine counseling        Orders Placed This Encounter   Procedures    Comp Metabolic Panel (14)    Lipid Panel       Meds & Refills for this Visit:  Requested Prescriptions      No prescriptions requested or ordered in this encounter       Imaging & Consults:  None    Advised flu shot  Advised COVID vaccine.  Did well with PT for her right shoulder and had injection.  Advised antihistamine 2 times a day when her sinuses are bad and then resume to one.  Saline rinse/hot steam.  Sudifed prn.  HTN -- stable  Dyslipidemia -- labs ordered.  GERD -- stable  Stress reaction -- trying to be there for her kids    Length of visit/charting -- 41 minutes    The patient indicates understanding of these issues and agrees to the plan.  Return in about 3 months (around 1/8/2025) for med check 30.

## 2024-10-18 ENCOUNTER — OFFICE VISIT (OUTPATIENT)
Dept: FAMILY MEDICINE CLINIC | Facility: CLINIC | Age: 69
End: 2024-10-18
Payer: MEDICARE

## 2024-10-18 VITALS
RESPIRATION RATE: 18 BRPM | DIASTOLIC BLOOD PRESSURE: 80 MMHG | WEIGHT: 133 LBS | HEART RATE: 90 BPM | OXYGEN SATURATION: 99 % | HEIGHT: 62 IN | SYSTOLIC BLOOD PRESSURE: 120 MMHG | BODY MASS INDEX: 24.48 KG/M2 | TEMPERATURE: 98 F

## 2024-10-18 DIAGNOSIS — Z23 NEED FOR VACCINATION: ICD-10-CM

## 2024-10-18 DIAGNOSIS — Z23 NEED FOR INFLUENZA VACCINATION: ICD-10-CM

## 2024-10-18 DIAGNOSIS — M79.11 LOCALIZED MYALGIA OF MASSETER: Primary | ICD-10-CM

## 2024-10-18 RX ORDER — CYCLOBENZAPRINE HCL 10 MG
10 TABLET ORAL 3 TIMES DAILY
Qty: 30 TABLET | Refills: 0 | Status: SHIPPED | OUTPATIENT
Start: 2024-10-18

## 2024-10-18 NOTE — PROGRESS NOTES
Subjective:   Jennifer Landeros is a 69 year old female who presents for Pain (Shooting pain on right side of the cheek that radiates to back of the ear started 5 days ago, pt went to the dentist to check of it was tooth related was told tooth was fine, pt has been taking ibuprofen which has been slighly helping )     On and off for a year and a half. Saw dentist. Recommended mouth guard. Doesn't feel that she grinds or clenches jaw, but knows that her stress levels are up. Daughter is newly pregnant after a year of TTC.   Had TMJ issues about 35 years ago. Did have a mouthguard at that time.   Has been using ibuprofen since yesterday. Taking 400mg BID. Feels this is helping.   Wants to make sure pain isn't due to sinuses.     History/Other:    Chief Complaint Reviewed and Verified  Nursing Notes Reviewed and   Verified  Tobacco Reviewed  Allergies Reviewed  Medications Reviewed    Medical History Reviewed  Surgical History Reviewed  Family History   Reviewed  Social History Reviewed         Tobacco:  She has never smoked tobacco.    Current Outpatient Medications   Medication Sig Dispense Refill    cyclobenzaprine 10 MG Oral Tab Take 1 tablet (10 mg total) by mouth 3 (three) times daily. 30 tablet 0    methylPREDNISolone 4 MG Oral Tablet Therapy Pack Take as directed 1 each 0    cimetidine 300 MG Oral Tab Take 2 tablets (600 mg total) by mouth 2 (two) times daily. 360 tablet 1    atorvastatin 10 MG Oral Tab Take 1 tablet (10 mg total) by mouth nightly. 90 tablet 1    spironolactone 25 MG Oral Tab Take 1 tablet (25 mg total) by mouth daily. 90 tablet 1    albuterol 108 (90 Base) MCG/ACT Inhalation Aero Soln Inhale 2 puffs into the lungs every 4 to 6 hours as needed for Wheezing. 1 each 0    Magnesium 125 MG Oral Cap       Fexofenadine HCl 180 MG Oral Tab Take 1 tablet (180 mg total) by mouth daily as needed.      ValACYclovir HCl 1 G Oral Tab Take 2 tablets by mouth twice a day as directed,  for outbeaks 16  tablet 1    Cholecalciferol (VITAMIN D) 2000 UNITS Oral Cap Take 1 capsule (2,000 Units total) by mouth daily.      Balsalazide Disodium (COLAZAL) 750 MG Oral Cap 3 capsules (2,250 mg total) daily.  3         Review of Systems:  Review of Systems   All other systems reviewed and are negative.    Objective:   /80 (BP Location: Left arm, Patient Position: Sitting, Cuff Size: adult)   Pulse 90   Temp 97.6 °F (36.4 °C)   Resp 18   Ht 5' 2\" (1.575 m)   Wt 133 lb (60.3 kg)   SpO2 99%   BMI 24.33 kg/m²  Estimated body mass index is 24.33 kg/m² as calculated from the following:    Height as of this encounter: 5' 2\" (1.575 m).    Weight as of this encounter: 133 lb (60.3 kg).  Physical Exam  Vitals reviewed.   Constitutional:       General: She is not in acute distress.     Appearance: Normal appearance. She is not ill-appearing, toxic-appearing or diaphoretic.   HENT:      Head: Normocephalic and atraumatic.      Right Ear: Tympanic membrane, ear canal and external ear normal. There is no impacted cerumen.      Left Ear: Tympanic membrane, ear canal and external ear normal. There is no impacted cerumen.      Nose: Nose normal. No congestion or rhinorrhea.      Mouth/Throat:      Mouth: Mucous membranes are moist.      Pharynx: Oropharynx is clear. No oropharyngeal exudate or posterior oropharyngeal erythema.   Eyes:      General: No scleral icterus.        Right eye: No discharge.         Left eye: No discharge.      Conjunctiva/sclera: Conjunctivae normal.   Cardiovascular:      Rate and Rhythm: Normal rate and regular rhythm.      Pulses: Normal pulses.      Heart sounds: Normal heart sounds.   Pulmonary:      Effort: Pulmonary effort is normal.      Breath sounds: Normal breath sounds.   Musculoskeletal:      Cervical back: Normal range of motion.   Skin:     General: Skin is warm and dry.   Neurological:      General: No focal deficit present.      Mental Status: She is alert and oriented to person, place,  and time.   Psychiatric:         Mood and Affect: Mood normal.         Behavior: Behavior normal.         Thought Content: Thought content normal.         Judgment: Judgment normal.         Assessment & Plan:   1. Localized myalgia of masseter (Primary)  2. Need for influenza vaccination  3. Need for vaccination  -     INFLUENZA VAC HIGH DOSE PRSV FREE  Other orders  -     Cyclobenzaprine HCl; Take 1 tablet (10 mg total) by mouth 3 (three) times daily.  Dispense: 30 tablet; Refill: 0  Discussed with pt that sinuses are clear and this appears to be related to probable grinding/clenching of jaw.   Pt will follow up with dentist for mouth guard if problem continues.   Rx sent for cyclobenzaprine to try to relax muscle if pain is severe again.   Will receive flu vaccine today.       Return in about 3 months (around 1/7/2025).    BOBBY Layton, 10/18/2024, 11:11 AM

## 2024-10-20 NOTE — PATIENT INSTRUCTIONS
Future Appointments   Date Time Provider Department Center   1/7/2025  2:00 PM Lexie Alexander DO [facility]   7/15/2025  9:00 AM Lexie Alexander DO [facility]

## 2025-01-03 ENCOUNTER — LABORATORY ENCOUNTER (OUTPATIENT)
Dept: LAB | Age: 70
End: 2025-01-03
Attending: FAMILY MEDICINE
Payer: MEDICARE

## 2025-01-03 DIAGNOSIS — E78.5 DYSLIPIDEMIA: ICD-10-CM

## 2025-01-03 DIAGNOSIS — I10 ESSENTIAL HYPERTENSION: ICD-10-CM

## 2025-01-03 LAB
ALBUMIN SERPL-MCNC: 4.3 G/DL (ref 3.2–4.8)
ALBUMIN/GLOB SERPL: 1.3 {RATIO} (ref 1–2)
ALP LIVER SERPL-CCNC: 59 U/L
ALT SERPL-CCNC: 20 U/L
ANION GAP SERPL CALC-SCNC: 8 MMOL/L (ref 0–18)
AST SERPL-CCNC: 25 U/L (ref ?–34)
BILIRUB SERPL-MCNC: 1.2 MG/DL (ref 0.2–1.1)
BUN BLD-MCNC: 11 MG/DL (ref 9–23)
CALCIUM BLD-MCNC: 9.4 MG/DL (ref 8.7–10.4)
CHLORIDE SERPL-SCNC: 103 MMOL/L (ref 98–112)
CHOLEST SERPL-MCNC: 137 MG/DL (ref ?–200)
CO2 SERPL-SCNC: 27 MMOL/L (ref 21–32)
CREAT BLD-MCNC: 0.84 MG/DL
EGFRCR SERPLBLD CKD-EPI 2021: 76 ML/MIN/1.73M2 (ref 60–?)
FASTING PATIENT LIPID ANSWER: YES
FASTING STATUS PATIENT QL REPORTED: YES
GLOBULIN PLAS-MCNC: 3.2 G/DL (ref 2–3.5)
GLUCOSE BLD-MCNC: 81 MG/DL (ref 70–99)
HDLC SERPL-MCNC: 56 MG/DL (ref 40–59)
LDLC SERPL CALC-MCNC: 70 MG/DL (ref ?–100)
NONHDLC SERPL-MCNC: 81 MG/DL (ref ?–130)
OSMOLALITY SERPL CALC.SUM OF ELEC: 284 MOSM/KG (ref 275–295)
POTASSIUM SERPL-SCNC: 3.8 MMOL/L (ref 3.5–5.1)
PROT SERPL-MCNC: 7.5 G/DL (ref 5.7–8.2)
SODIUM SERPL-SCNC: 138 MMOL/L (ref 136–145)
TRIGL SERPL-MCNC: 46 MG/DL (ref 30–149)
VLDLC SERPL CALC-MCNC: 7 MG/DL (ref 0–30)

## 2025-01-03 PROCEDURE — 80053 COMPREHEN METABOLIC PANEL: CPT

## 2025-01-03 PROCEDURE — 36415 COLL VENOUS BLD VENIPUNCTURE: CPT

## 2025-01-03 PROCEDURE — 80061 LIPID PANEL: CPT

## 2025-01-07 ENCOUNTER — OFFICE VISIT (OUTPATIENT)
Dept: FAMILY MEDICINE CLINIC | Facility: CLINIC | Age: 70
End: 2025-01-07
Payer: MEDICARE

## 2025-01-07 VITALS
OXYGEN SATURATION: 97 % | HEIGHT: 62 IN | SYSTOLIC BLOOD PRESSURE: 120 MMHG | DIASTOLIC BLOOD PRESSURE: 70 MMHG | BODY MASS INDEX: 24.89 KG/M2 | HEART RATE: 83 BPM | WEIGHT: 135.25 LBS | RESPIRATION RATE: 16 BRPM

## 2025-01-07 DIAGNOSIS — K51.90 ULCERATIVE COLITIS WITHOUT COMPLICATIONS, UNSPECIFIED LOCATION (HCC): ICD-10-CM

## 2025-01-07 DIAGNOSIS — Z79.899 MEDICATION MANAGEMENT: ICD-10-CM

## 2025-01-07 DIAGNOSIS — E55.9 VITAMIN D DEFICIENCY: ICD-10-CM

## 2025-01-07 DIAGNOSIS — I10 ESSENTIAL HYPERTENSION: ICD-10-CM

## 2025-01-07 DIAGNOSIS — I25.10 CORONARY ARTERY DISEASE INVOLVING NATIVE CORONARY ARTERY OF NATIVE HEART WITHOUT ANGINA PECTORIS: ICD-10-CM

## 2025-01-07 DIAGNOSIS — Z13.89 SCREENING FOR GENITOURINARY CONDITION: ICD-10-CM

## 2025-01-07 DIAGNOSIS — M25.511 ACUTE PAIN OF RIGHT SHOULDER: ICD-10-CM

## 2025-01-07 DIAGNOSIS — E78.5 DYSLIPIDEMIA: Primary | ICD-10-CM

## 2025-01-07 DIAGNOSIS — J45.20 MILD INTERMITTENT ASTHMA WITHOUT COMPLICATION (HCC): ICD-10-CM

## 2025-01-07 DIAGNOSIS — R73.9 HYPERGLYCEMIA: ICD-10-CM

## 2025-01-07 DIAGNOSIS — R79.89 HIGH SERUM HIGH DENSITY LIPOPROTEIN (HDL): ICD-10-CM

## 2025-01-07 RX ORDER — CETIRIZINE HYDROCHLORIDE 10 MG/1
10 TABLET ORAL DAILY
COMMUNITY

## 2025-01-08 ENCOUNTER — TELEPHONE (OUTPATIENT)
Dept: FAMILY MEDICINE CLINIC | Facility: CLINIC | Age: 70
End: 2025-01-08

## 2025-01-08 NOTE — PROGRESS NOTES
Jennifer Landeros is a 70 year old female.  HPI:   Pt. Is here for med check.    Essential hypertension --  stable on sprionolactone  Coronary artery disease involving native coronary artery of native heart without angina pectoris/dyslipidemia -- stable on atorvastatin  Mild intermittent asthma without complication  -- stable; AAP and ACT done 25  Gastroesophageal reflux disease, unspecified whether esophagitis present  -- stable on cimetidine-- per GI  PT. Complains of right shoulder discomfort in her right deltoid for the past 1-2 weeks.  Has been doing her PT exercises when she had her frozen.   PT. States her daughter is 4 months pregnant with a girl.  Meds reviewed.      atorvastatin 10 MG Oral Tab, Take 1 tablet (10 mg total) by mouth nightly., Disp: 90 tablet, Rfl: 1  cimetidine 300 MG Oral Tab, TAKE TWO TABLETS BY MOUTH TWICE DAILY, Disp: 360 tablet, Rfl: 1  SPIRONOLACTONE 25 MG Oral Tab, Take 1 tablet (25 mg total) by mouth daily. Amne only. Make sure same ., Disp: 90 tablet, Rfl: 1  metroNIDAZOLE 1 % External Gel, Apply 1 Application topically daily., Disp: 60 g, Rfl: 1  albuterol 108 (90 Base) MCG/ACT Inhalation Aero Soln, Inhale 2 puffs into the lungs every 4 to 6 hours as needed for Wheezing., Disp: 1 each, Rfl: 0  Magnesium 125 MG Oral Cap, , Disp: , Rfl:   Fexofenadine HCl 180 MG Oral Tab, Take 180 mg by mouth daily as needed., Disp: , Rfl:   ValACYclovir HCl 1 G Oral Tab, Take 2 tablets by mouth twice a day as directed,  for outbeaks, Disp: 16 tablet, Rfl: 1  Cholecalciferol (VITAMIN D) 2000 UNITS Oral Cap, Take 2,000 Units by mouth daily., Disp: , Rfl:   Balsalazide Disodium (COLAZAL) 750 MG Oral Cap, 2,250 mg daily., Disp: , Rfl: 3  Folic Acid 1 MG Oral Tab, Take 1 tablet by mouth daily., Disp: , Rfl:     No current facility-administered medications for this visit.     Allergies   Allergen Reactions    Seasonal       Past Medical History:    Anemia     delivery, without  mention of indication, unspecified as to episode of care(269.70)    Elevated blood pressure reading without diagnosis of hypertension    Esophageal reflux    Palpitations    Personal history of urinary (tract) infection    Unspecified asthma(493.90)    Unspecified sinusitis (chronic)      Social History:  Social History     Socioeconomic History    Marital status:    Tobacco Use    Smoking status: Never    Smokeless tobacco: Never   Vaping Use    Vaping status: Never Used   Substance and Sexual Activity    Alcohol use: Yes     Alcohol/week: 0.0 standard drinks of alcohol     Comment: wine 3 nights a week     Drug use: No    Sexual activity: Yes   Other Topics Concern    Caffeine Concern Yes     Comment: coffee in am     Exercise Yes     Comment: daily         Results for orders placed or performed in visit on 01/03/25   Comp Metabolic Panel (14)    Collection Time: 01/03/25  7:46 AM   Result Value Ref Range    Glucose 81 70 - 99 mg/dL    Sodium 138 136 - 145 mmol/L    Potassium 3.8 3.5 - 5.1 mmol/L    Chloride 103 98 - 112 mmol/L    CO2 27.0 21.0 - 32.0 mmol/L    Anion Gap 8 0 - 18 mmol/L    BUN 11 9 - 23 mg/dL    Creatinine 0.84 0.55 - 1.02 mg/dL    Calcium, Total 9.4 8.7 - 10.4 mg/dL    Calculated Osmolality 284 275 - 295 mOsm/kg    eGFR-Cr 76 >=60 mL/min/1.73m2    AST 25 <34 U/L    ALT 20 10 - 49 U/L    Alkaline Phosphatase 59 55 - 142 U/L    Bilirubin, Total 1.2 (H) 0.2 - 1.1 mg/dL    Total Protein 7.5 5.7 - 8.2 g/dL    Albumin 4.3 3.2 - 4.8 g/dL    Globulin  3.2 2.0 - 3.5 g/dL    A/G Ratio 1.3 1.0 - 2.0    Patient Fasting for CMP? Yes    Lipid Panel    Collection Time: 01/03/25  7:46 AM   Result Value Ref Range    Cholesterol, Total 137 <200 mg/dL    HDL Cholesterol 56 40 - 59 mg/dL    Triglycerides 46 30 - 149 mg/dL    LDL Cholesterol 70 <100 mg/dL    VLDL 7 0 - 30 mg/dL    Non HDL Chol 81 <130 mg/dL    Patient Fasting for Lipid? Yes        REVIEW OF SYSTEMS:   GENERAL: feels well otherwise  SKIN:  denies any unusual skin lesions  HEENT: left ear wax  LUNGS: denies shortness of breath with exertion  CARDIOVASCULAR: denies chest pain on exertion  GI: denies abdominal pain,denies heartburn  MUSCULOSKELETAL: denies back pain; right shoulder pain  EXTREMITIES:  No pain or numbness    EXAM:   /70 (BP Location: Left arm, Patient Position: Sitting, Cuff Size: adult)   Pulse 83   Resp 16   Ht 5' 2\" (1.575 m)   Wt 135 lb 4 oz (61.3 kg)   SpO2 97%   BMI 24.74 kg/m²   GENERAL: well developed, well nourished,in no apparent distress  SKIN: no rashes,no suspicious lesions  HEENT: atraumatic, normocephalic  NECK: supple,no adenopathy,no bruits  LUNGS: clear to auscultation  CARDIO: RRR without murmur  EXTREMITIES: no cyanosis, clubbing or edema; right deltoid tenderness with abduction    ASSESSMENT AND PLAN:     Encounter Diagnoses   Name Primary?    Dyslipidemia Yes    Acute pain of right shoulder     Essential hypertension     Mild intermittent asthma without complication (HCC)     Ulcerative colitis without complications, unspecified location (HCC)     Coronary artery disease involving native coronary artery of native heart without angina pectoris     High serum high density lipoprotein (HDL)     Hyperglycemia     Vitamin D deficiency     Screening for genitourinary condition     Medication management        Orders Placed This Encounter   Procedures    CBC With Differential With Platelet    Comp Metabolic Panel (14)    TSH W Reflex To Free T4    Lipid Panel    Vitamin D, 25-Hydroxy    Hemoglobin A1C    Urinalysis with Culture Reflex       Meds & Refills for this Visit:  Requested Prescriptions      No prescriptions requested or ordered in this encounter       Imaging & Consults:  OP REFERRAL TO [facility]     CAD/Dyslipidemia -- on atorvastatin  HTN -- stable on spirinolactone  Hypergylcemia -- stable  GERD -- stable on cimetidine BID  CKD -Stage 3 -- will focus on drinking more  water'  Asthma -- stable  UC -- stable  Labs ordered for June 2025.  Recent labs reviewed.  Mammo and dexa are UTD.    Colonoscopy per Dr. Cardozo.  Rest of her other conditions are stable.        The patient indicates understanding of these issues and agrees to the plan.  Return in about 6 months (around 7/7/2025) for MA supervisit.

## 2025-02-16 DIAGNOSIS — Z79.899 MEDICATION MANAGEMENT: ICD-10-CM

## 2025-02-16 DIAGNOSIS — I25.10 CORONARY ARTERY DISEASE INVOLVING NATIVE CORONARY ARTERY OF NATIVE HEART WITHOUT ANGINA PECTORIS: ICD-10-CM

## 2025-02-16 DIAGNOSIS — I10 ESSENTIAL HYPERTENSION: ICD-10-CM

## 2025-02-17 RX ORDER — ATORVASTATIN CALCIUM 10 MG/1
10 TABLET, FILM COATED ORAL NIGHTLY
Qty: 90 TABLET | Refills: 0 | Status: SHIPPED | OUTPATIENT
Start: 2025-02-17

## 2025-02-17 RX ORDER — SPIRONOLACTONE 25 MG/1
25 TABLET ORAL DAILY
Qty: 90 TABLET | Refills: 0 | Status: SHIPPED | OUTPATIENT
Start: 2025-02-17

## 2025-02-17 NOTE — TELEPHONE ENCOUNTER
Last office visit: 1/7/25   Protocol: pass  Requested medication(s) are due for refill today: yes  Requested medication(s) are on the active medication list same strength, form, dose/ sig: yes  Requested medication(s) are managed by provider: yes  Patient has already received a courtsey refill: no    NOV: 7/15/25    Asked to Return: 7/7/25

## 2025-02-26 ENCOUNTER — PATIENT MESSAGE (OUTPATIENT)
Dept: FAMILY MEDICINE CLINIC | Facility: CLINIC | Age: 70
End: 2025-02-26

## 2025-05-13 DIAGNOSIS — Z79.899 MEDICATION MANAGEMENT: ICD-10-CM

## 2025-05-13 DIAGNOSIS — I25.10 CORONARY ARTERY DISEASE INVOLVING NATIVE CORONARY ARTERY OF NATIVE HEART WITHOUT ANGINA PECTORIS: ICD-10-CM

## 2025-05-13 DIAGNOSIS — I10 ESSENTIAL HYPERTENSION: ICD-10-CM

## 2025-05-14 RX ORDER — SPIRONOLACTONE 25 MG/1
25 TABLET ORAL DAILY
Qty: 90 TABLET | Refills: 0 | Status: SHIPPED | OUTPATIENT
Start: 2025-05-14

## 2025-05-14 RX ORDER — ATORVASTATIN CALCIUM 10 MG/1
10 TABLET, FILM COATED ORAL NIGHTLY
Qty: 90 TABLET | Refills: 0 | Status: SHIPPED | OUTPATIENT
Start: 2025-05-14

## 2025-05-14 NOTE — TELEPHONE ENCOUNTER
Last office visit: 1/7/25   Protocol: pass  Requested medication(s) are due for refill today:yes  Requested medication(s) are on the active medication list same strength, form, dose/ sig: yes  Requested medication(s) are managed by provider: yes  Patient has already received a courtsey refill: no    NOV: 7/15/25    Asked to Return: 7/7/25     Consent 2/Introductory Paragraph: Mohs surgery was explained to the patient and consent was obtained. The risks, benefits and alternatives to therapy were discussed in detail. Specifically, the risks of infection, scarring, bleeding, prolonged wound healing, incomplete removal, allergy to anesthesia, nerve injury and recurrence were addressed. Prior to the procedure, the treatment site was clearly identified and confirmed by the patient. All components of Universal Protocol/PAUSE Rule completed.

## 2025-05-26 DIAGNOSIS — K21.9 GASTROESOPHAGEAL REFLUX DISEASE, UNSPECIFIED WHETHER ESOPHAGITIS PRESENT: ICD-10-CM

## 2025-05-27 RX ORDER — CIMETIDINE 300 MG
600 TABLET ORAL 2 TIMES DAILY
Qty: 360 TABLET | Refills: 1 | Status: SHIPPED | OUTPATIENT
Start: 2025-05-27

## 2025-05-27 NOTE — TELEPHONE ENCOUNTER
Last office visit: 1/7/2025   Protocol: pass  Requested medication(s) are due for refill today: yes  Requested medication(s) are on the active medication list same strength, form, dose/ sig: yes  Requested medication(s) are managed by provider: yes  Patient has already received a courtsey refill: no    NOV: 7/15/2025  Last Labs: 1/3/2025  Asked to Return: 7/7/2025

## 2025-05-29 ENCOUNTER — PATIENT MESSAGE (OUTPATIENT)
Dept: FAMILY MEDICINE CLINIC | Facility: CLINIC | Age: 70
End: 2025-05-29

## 2025-07-11 ENCOUNTER — LAB ENCOUNTER (OUTPATIENT)
Dept: LAB | Age: 70
End: 2025-07-11
Payer: MEDICARE

## 2025-07-11 DIAGNOSIS — Z13.89 SCREENING FOR GENITOURINARY CONDITION: ICD-10-CM

## 2025-07-11 DIAGNOSIS — E78.5 DYSLIPIDEMIA: ICD-10-CM

## 2025-07-11 DIAGNOSIS — E55.9 VITAMIN D DEFICIENCY: ICD-10-CM

## 2025-07-11 DIAGNOSIS — R73.9 HYPERGLYCEMIA: ICD-10-CM

## 2025-07-11 DIAGNOSIS — I10 ESSENTIAL HYPERTENSION: ICD-10-CM

## 2025-07-11 LAB
ALBUMIN SERPL-MCNC: 4.6 G/DL (ref 3.2–4.8)
ALBUMIN/GLOB SERPL: 1.5 {RATIO} (ref 1–2)
ALP LIVER SERPL-CCNC: 54 U/L (ref 55–142)
ALT SERPL-CCNC: 17 U/L (ref 10–49)
ANION GAP SERPL CALC-SCNC: 7 MMOL/L (ref 0–18)
AST SERPL-CCNC: 23 U/L (ref ?–34)
BASOPHILS # BLD AUTO: 0.07 X10(3) UL (ref 0–0.2)
BASOPHILS NFR BLD AUTO: 1 %
BILIRUB SERPL-MCNC: 1.8 MG/DL (ref 0.2–1.1)
BILIRUB UR QL STRIP.AUTO: NEGATIVE
BUN BLD-MCNC: 10 MG/DL (ref 9–23)
CALCIUM BLD-MCNC: 9.2 MG/DL (ref 8.7–10.6)
CHLORIDE SERPL-SCNC: 100 MMOL/L (ref 98–112)
CHOLEST SERPL-MCNC: 142 MG/DL (ref ?–200)
CLARITY UR REFRACT.AUTO: CLEAR
CO2 SERPL-SCNC: 28 MMOL/L (ref 21–32)
CREAT BLD-MCNC: 0.9 MG/DL (ref 0.55–1.02)
EGFRCR SERPLBLD CKD-EPI 2021: 70 ML/MIN/1.73M2 (ref 60–?)
EOSINOPHIL # BLD AUTO: 0.27 X10(3) UL (ref 0–0.7)
EOSINOPHIL NFR BLD AUTO: 3.7 %
ERYTHROCYTE [DISTWIDTH] IN BLOOD BY AUTOMATED COUNT: 11.9 %
EST. AVERAGE GLUCOSE BLD GHB EST-MCNC: 111 MG/DL (ref 68–126)
FASTING PATIENT LIPID ANSWER: YES
FASTING STATUS PATIENT QL REPORTED: YES
GLOBULIN PLAS-MCNC: 3 G/DL (ref 2–3.5)
GLUCOSE BLD-MCNC: 88 MG/DL (ref 70–99)
GLUCOSE UR STRIP.AUTO-MCNC: NORMAL MG/DL
HBA1C MFR BLD: 5.5 % (ref ?–5.7)
HCT VFR BLD AUTO: 40.3 % (ref 35–48)
HDLC SERPL-MCNC: 59 MG/DL (ref 40–59)
HGB BLD-MCNC: 13 G/DL (ref 12–16)
IMM GRANULOCYTES # BLD AUTO: 0.02 X10(3) UL (ref 0–1)
IMM GRANULOCYTES NFR BLD: 0.3 %
KETONES UR STRIP.AUTO-MCNC: NEGATIVE MG/DL
LDLC SERPL CALC-MCNC: 71 MG/DL (ref ?–100)
LEUKOCYTE ESTERASE UR QL STRIP.AUTO: NEGATIVE
LYMPHOCYTES # BLD AUTO: 2.68 X10(3) UL (ref 1–4)
LYMPHOCYTES NFR BLD AUTO: 36.5 %
MCH RBC QN AUTO: 27 PG (ref 26–34)
MCHC RBC AUTO-ENTMCNC: 32.3 G/DL (ref 31–37)
MCV RBC AUTO: 83.8 FL (ref 80–100)
MONOCYTES # BLD AUTO: 0.57 X10(3) UL (ref 0.1–1)
MONOCYTES NFR BLD AUTO: 7.8 %
NEUTROPHILS # BLD AUTO: 3.73 X10 (3) UL (ref 1.5–7.7)
NEUTROPHILS # BLD AUTO: 3.73 X10(3) UL (ref 1.5–7.7)
NEUTROPHILS NFR BLD AUTO: 50.7 %
NITRITE UR QL STRIP.AUTO: NEGATIVE
NONHDLC SERPL-MCNC: 83 MG/DL (ref ?–130)
OSMOLALITY SERPL CALC.SUM OF ELEC: 278 MOSM/KG (ref 275–295)
PH UR STRIP.AUTO: 6 [PH] (ref 5–8)
PLATELET # BLD AUTO: 192 10(3)UL (ref 150–450)
POTASSIUM SERPL-SCNC: 3.9 MMOL/L (ref 3.5–5.1)
PROT SERPL-MCNC: 7.6 G/DL (ref 5.7–8.2)
PROT UR STRIP.AUTO-MCNC: NEGATIVE MG/DL
RBC # BLD AUTO: 4.81 X10(6)UL (ref 3.8–5.3)
RBC UR QL AUTO: NEGATIVE
SODIUM SERPL-SCNC: 135 MMOL/L (ref 136–145)
SP GR UR STRIP.AUTO: 1.01 (ref 1–1.03)
TRIGL SERPL-MCNC: 58 MG/DL (ref 30–149)
TSI SER-ACNC: 1.61 UIU/ML (ref 0.55–4.78)
UROBILINOGEN UR STRIP.AUTO-MCNC: NORMAL MG/DL
VIT D+METAB SERPL-MCNC: 56.4 NG/ML (ref 30–100)
VLDLC SERPL CALC-MCNC: 9 MG/DL (ref 0–30)
WBC # BLD AUTO: 7.3 X10(3) UL (ref 4–11)

## 2025-07-11 PROCEDURE — 85025 COMPLETE CBC W/AUTO DIFF WBC: CPT

## 2025-07-11 PROCEDURE — 84443 ASSAY THYROID STIM HORMONE: CPT

## 2025-07-11 PROCEDURE — 36415 COLL VENOUS BLD VENIPUNCTURE: CPT

## 2025-07-11 PROCEDURE — 82306 VITAMIN D 25 HYDROXY: CPT

## 2025-07-11 PROCEDURE — 80061 LIPID PANEL: CPT

## 2025-07-11 PROCEDURE — 80053 COMPREHEN METABOLIC PANEL: CPT

## 2025-07-11 PROCEDURE — 81003 URINALYSIS AUTO W/O SCOPE: CPT

## 2025-07-11 PROCEDURE — 83036 HEMOGLOBIN GLYCOSYLATED A1C: CPT

## 2025-07-15 ENCOUNTER — OFFICE VISIT (OUTPATIENT)
Dept: FAMILY MEDICINE CLINIC | Facility: CLINIC | Age: 70
End: 2025-07-15
Payer: MEDICARE

## 2025-07-15 VITALS
BODY MASS INDEX: 24.71 KG/M2 | DIASTOLIC BLOOD PRESSURE: 72 MMHG | WEIGHT: 136 LBS | OXYGEN SATURATION: 98 % | HEART RATE: 86 BPM | RESPIRATION RATE: 16 BRPM | HEIGHT: 62.25 IN | SYSTOLIC BLOOD PRESSURE: 124 MMHG

## 2025-07-15 DIAGNOSIS — Z91.09 ENVIRONMENTAL ALLERGIES: ICD-10-CM

## 2025-07-15 DIAGNOSIS — I10 ESSENTIAL HYPERTENSION: ICD-10-CM

## 2025-07-15 DIAGNOSIS — M85.80 OSTEOPENIA, UNSPECIFIED LOCATION: ICD-10-CM

## 2025-07-15 DIAGNOSIS — Z86.19 HISTORY OF COLD SORES: ICD-10-CM

## 2025-07-15 DIAGNOSIS — L92.0 GRANULOMA ANNULARE: ICD-10-CM

## 2025-07-15 DIAGNOSIS — Z71.85 VACCINE COUNSELING: ICD-10-CM

## 2025-07-15 DIAGNOSIS — Z86.19 HISTORY OF HERPES GENITALIS: ICD-10-CM

## 2025-07-15 DIAGNOSIS — B00.9 HSV INFECTION: ICD-10-CM

## 2025-07-15 DIAGNOSIS — K44.9 HIATAL HERNIA: ICD-10-CM

## 2025-07-15 DIAGNOSIS — R73.9 HYPERGLYCEMIA: ICD-10-CM

## 2025-07-15 DIAGNOSIS — K51.90 ULCERATIVE COLITIS WITHOUT COMPLICATIONS, UNSPECIFIED LOCATION (HCC): ICD-10-CM

## 2025-07-15 DIAGNOSIS — E78.5 DYSLIPIDEMIA: ICD-10-CM

## 2025-07-15 DIAGNOSIS — J45.20 MILD INTERMITTENT ASTHMA WITHOUT COMPLICATION (HCC): ICD-10-CM

## 2025-07-15 DIAGNOSIS — Z00.00 ENCOUNTER FOR ANNUAL HEALTH EXAMINATION: Primary | ICD-10-CM

## 2025-07-15 DIAGNOSIS — Z78.0 MENOPAUSE: ICD-10-CM

## 2025-07-15 DIAGNOSIS — Z12.31 SCREENING MAMMOGRAM FOR BREAST CANCER: ICD-10-CM

## 2025-07-15 DIAGNOSIS — E55.9 VITAMIN D DEFICIENCY: ICD-10-CM

## 2025-07-15 DIAGNOSIS — K21.9 GASTROESOPHAGEAL REFLUX DISEASE, UNSPECIFIED WHETHER ESOPHAGITIS PRESENT: ICD-10-CM

## 2025-07-15 DIAGNOSIS — I25.10 CORONARY ARTERY DISEASE INVOLVING NATIVE CORONARY ARTERY OF NATIVE HEART WITHOUT ANGINA PECTORIS: ICD-10-CM

## 2025-07-15 DIAGNOSIS — Z79.899 MEDICATION MANAGEMENT: ICD-10-CM

## 2025-07-15 PROCEDURE — 1123F ACP DISCUSS/DSCN MKR DOCD: CPT | Performed by: FAMILY MEDICINE

## 2025-07-15 PROCEDURE — 1160F RVW MEDS BY RX/DR IN RCRD: CPT | Performed by: FAMILY MEDICINE

## 2025-07-15 PROCEDURE — 1159F MED LIST DOCD IN RCRD: CPT | Performed by: FAMILY MEDICINE

## 2025-07-15 PROCEDURE — 99214 OFFICE O/P EST MOD 30 MIN: CPT | Performed by: FAMILY MEDICINE

## 2025-07-15 PROCEDURE — 96160 PT-FOCUSED HLTH RISK ASSMT: CPT | Performed by: FAMILY MEDICINE

## 2025-07-15 PROCEDURE — 1126F AMNT PAIN NOTED NONE PRSNT: CPT | Performed by: FAMILY MEDICINE

## 2025-07-15 PROCEDURE — G0439 PPPS, SUBSEQ VISIT: HCPCS | Performed by: FAMILY MEDICINE

## 2025-07-15 PROCEDURE — 1170F FXNL STATUS ASSESSED: CPT | Performed by: FAMILY MEDICINE

## 2025-07-15 RX ORDER — SPIRONOLACTONE 25 MG/1
25 TABLET ORAL DAILY
Qty: 90 TABLET | Refills: 1 | Status: SHIPPED | OUTPATIENT
Start: 2025-07-15

## 2025-07-15 RX ORDER — CIMETIDINE 300 MG
600 TABLET ORAL 2 TIMES DAILY
Qty: 360 TABLET | Refills: 1 | Status: SHIPPED | OUTPATIENT
Start: 2025-07-15

## 2025-07-15 RX ORDER — ATORVASTATIN CALCIUM 10 MG/1
10 TABLET, FILM COATED ORAL NIGHTLY
Qty: 90 TABLET | Refills: 1 | Status: SHIPPED | OUTPATIENT
Start: 2025-07-15

## 2025-07-15 NOTE — PROGRESS NOTES
Subjective:   Jennifer Landeros is a 70 year old female who presents for a MA AHA (Medicare Advantage Annual Health Assessment) and scheduled follow up of multiple significant but stable problems.   History of Present Illness  Jennifer Landeros is a 70 year old female who presents for a routine follow-up visit.    She feels as though she is 'six months pregnant all the time' and is concerned about her weight. Despite reducing her food intake, she struggles with weight management. She engages in regular physical activity, walking four to five miles daily and participating in a half-hour exercise video two to three times a week.    She has a history of vascular disease of the heart and is currently on a low dose of atorvastatin. Her LDL cholesterol is at 71. She is considering options to manage this.    She notes that the normal range for bilirubin has changed over the years, which initially caused concern. She reports no gallbladder-related symptoms such as nausea, vomiting, or right upper quadrant pain.    Recent laboratory results indicate normal A1c, electrolytes, vitamin D, thyroid function, and cholesterol levels. She is aware of her immunization status, including a recent TDAT booster.    She is actively involved in her family life, particularly with her new grandchild, Darcie Schmitt, who is three weeks old. She frequently helps out as her daughter recovers from a difficult delivery.      History/Other:   Fall Risk Assessment:   She has been screened for Falls and is low risk.      Cognitive Assessment:   She had a completely normal cognitive assessment - see flowsheet entries     Functional Ability/Status:   Jennifer Landeros has a completely normal functional assessment. See flowsheet for details.      Depression Screening (PHQ):  PHQ-2 SCORE: 0  , done 7/15/2025   If you checked off any problems, how difficult have these problems made it for you to do your work, take care of things at home, or get along  with other people?: Not difficult at all    Last [place] Suicide Screening on 7/15/2025 was No Risk.     10 minutes spent screening and counseling for depression    Advanced Directives:   She has a Living Will on file in [facility]; reviewed and discussed documents with patient (and family/surrogate if present).  She has a Power of  for Health Care on file in [facility].  Patient has Advance Care Planning documents present in EMR. Reviewed documents with patient (and family/surrogate if present).      Patient Active Problem List   Diagnosis    Vitamin D deficiency    HSV infection    Essential hypertension    Other and unspecified noninfectious gastroenteritis and colitis(558.9)    Environmental allergies    Granuloma annulare    Anemia    Hypertrophy of tonsil    Hiatal hernia    Abnormal CT scan of heart    Coronary artery disease involving native coronary artery of native heart without angina pectoris    Bilateral hearing loss due to cerumen impaction    History of cold sores    Osteopenia    History of herpes genitalis    Ulcerative colitis without complications, unspecified location ([facility])     Allergies:  She is allergic to seasonal.    Current Medications:  Outpatient Medications Marked as Taking for the 7/15/25 encounter (Office Visit) with Lexie Alexander, DO   Medication Sig    spironolactone 25 MG Oral Tab Take 1 tablet (25 mg total) by mouth daily.    cimetidine 300 MG Oral Tab Take 2 tablets (600 mg total) by mouth 2 (two) times daily.    atorvastatin 10 MG Oral Tab Take 1 tablet (10 mg total) by mouth nightly.    cetirizine 10 MG Oral Tab Take 1 tablet (10 mg total) by mouth daily.    albuterol 108 (90 Base) MCG/ACT Inhalation Aero Soln Inhale 2 puffs into the lungs every 4 to 6 hours as needed for Wheezing.    Magnesium 125 MG Oral Cap     Fexofenadine HCl 180 MG Oral Tab Take 1 tablet (180 mg total) by mouth daily as needed.    Cholecalciferol (VITAMIN D) 2000 UNITS Oral Cap Take 1 capsule (2,000 Units  total) by mouth daily.    Balsalazide Disodium (COLAZAL) 750 MG Oral Cap 3 capsules (2,250 mg total) daily.       Medical History:  She  has a past medical history of Anemia (2017),  delivery, without mention of indication, unspecified as to episode of care(669.70), Elevated blood pressure reading without diagnosis of hypertension, Esophageal reflux, Palpitations, Personal history of urinary (tract) infection, Unspecified asthma(493.90), and Unspecified sinusitis (chronic).  Surgical History:  She  has a past surgical history that includes colonoscopy (05/14/10; );  (); tonsillectomy (); gene biopsy stereo nodule 1 site right (cpt=19081) ( ?); and colonoscopy (7/15).   Family History:  Her family history includes Asthma in her father; Breast Cancer (age of onset: 62) in her mother; Cancer in her mother; Hypertension in her mother; Stroke in her mother.  Social History:  She  reports that she has never smoked. She has never used smokeless tobacco. She reports current alcohol use. She reports that she does not use drugs.    Tobacco:  She has never smoked tobacco.    CAGE Alcohol Screen:   CAGE screening score of 0 on 2025, showing low risk of alcohol abuse.      Patient Care Team:  Lexie Alexander DO as PCP - General (Family Practice)  Halle Diamond PT as Physical Therapist  Allegra Delgado as Physical Therapist  Janay Hudson PTA as Physical Therapy Assistant (Physical Therapy)    Review of Systems  GENERAL: feels well otherwise  SKIN: denies any unusual skin lesions  EYES: denies blurred vision or double vision  HEENT: denies nasal congestion, sinus pain or ST  LUNGS: denies shortness of breath with exertion  CARDIOVASCULAR: denies chest pain on exertion  GI: denies abdominal pain, denies heartburn  : denies dysuria, vaginal discharge or itching, no complaint of urinary incontinence   MUSCULOSKELETAL: denies back pain  NEURO: denies headaches  PSYCHE: denies  depression or anxiety  HEMATOLOGIC: denies hx of anemia  ENDOCRINE: denies thyroid history  ALL/ASTHMA: denies hx of allergy or asthma    Objective:   Physical Exam  General Appearance:  Alert, cooperative, no distress, appears stated age   Head:  Normocephalic, without obvious abnormality, atraumatic   Eyes:  Conjunctiva/corneas clear, EOM's intact both eyes   Ears:  Normal TM's and external ear canals, both ears   Nose: Nares normal, septum midline,mucosa normal, no drainage or sinus tenderness   Throat: Lips, mucosa, and tongue normal; teeth and gums normal   Neck: Supple, symmetrical, trachea midline, no adenopathy;  thyroid: not enlarged, symmetric, no tenderness/mass/nodules; no carotid bruit or JVD   Back:   Symmetric, no curvature, ROM normal, no CVA tenderness   Lungs:   Clear to auscultation bilaterally, respirations unlabored   Heart:  Regular rate and rhythm, S1 and S2 normal, no murmur, rub, or gallop   Abdomen:   Soft, non-tender, bowel sounds active all four quadrants,  no masses, no organomegaly   Pelvic: Deferred   Extremities: Extremities normal, atraumatic, no cyanosis or edema   Pulses: 2+ and symmetric   Skin: Skin color, texture, turgor normal, no rashes or lesions   Lymph nodes: Cervical, supraclavicular, and axillary nodes normal   Neurologic: Normal    and Breasts:  normal appearance, no masses or tenderness    /72 (BP Location: Left arm, Patient Position: Sitting, Cuff Size: adult)   Pulse 86   Resp 16   Ht 5' 2.25\" (1.581 m)   Wt 136 lb (61.7 kg)   SpO2 98%   BMI 24.68 kg/m²  Estimated body mass index is 24.68 kg/m² as calculated from the following:    Height as of this encounter: 5' 2.25\" (1.581 m).    Weight as of this encounter: 136 lb (61.7 kg).    Medicare Hearing Assessment:   Hearing Screening    Time taken: 7/15/2025  8:55 AM  Entry User: Dina Beth CMA  Screening Method: Finger Rub  Finger Rub Result: Pass         Visual Acuity:   Right Eye Visual Acuity:  Corrected Right Eye Chart Acuity: 20/30   Left Eye Visual Acuity: Corrected Left Eye Chart Acuity: 20/20   Both Eyes Visual Acuity: Corrected Both Eyes Chart Acuity: 20/20   Able To Tolerate Visual Acuity: Yes        Assessment & Plan:   Jennifer Landeros is a 70 year old female who presents for a Medicare Assessment.     1. Encounter for annual health examination (Primary)  2. Essential hypertension  -     Spironolactone; Take 1 tablet (25 mg total) by mouth daily.  Dispense: 90 tablet; Refill: 1  -     Detailed, Mod Complex (92794)  -     Comp Metabolic Panel (14); Future; Expected date: 12/20/2025  3. Environmental allergies  -     Detailed, Mod Complex (09975)  4. Gastroesophageal reflux disease, unspecified whether esophagitis present  -     Cimetidine; Take 2 tablets (600 mg total) by mouth 2 (two) times daily.  Dispense: 360 tablet; Refill: 1  -     Detailed, Mod Complex (71441)  5. Osteopenia, unspecified location  -     Detailed, Mod Complex (75711)  6. Coronary artery disease involving native coronary artery of native heart without angina pectoris  -     Atorvastatin Calcium; Take 1 tablet (10 mg total) by mouth nightly.  Dispense: 90 tablet; Refill: 1  -     Detailed, Mod Complex (66997)  -     Lipid Panel; Future; Expected date: 12/20/2025  7. Dyslipidemia  -     Atorvastatin Calcium; Take 1 tablet (10 mg total) by mouth nightly.  Dispense: 90 tablet; Refill: 1  -     Detailed, Mod Complex (09860)  -     Lipid Panel; Future; Expected date: 12/20/2025  8. Mild intermittent asthma without complication (HCC)  -     Detailed, Mod Complex (61971)  9. Ulcerative colitis without complications, unspecified location (HCC)  -     Detailed, Mod Complex (99994)  10. Hyperglycemia  -     Detailed, Mod Complex (69458)  11. Vitamin D deficiency  -     Detailed, Mod Complex (45373)  12. History of cold sores  -     Detailed, Mod Complex (85130)  13. Granuloma annulare  -     Detailed, Mod Complex (86140)  14. Hiatal  hernia  -     Detailed, Mod Complex (50557)  15. Bilateral hearing loss due to cerumen impaction  -     Detailed, Mod Complex (97720)  16. History of herpes genitalis  -     Detailed, Mod Complex (57898)  17. HSV infection  -     Detailed, Mod Complex (54862)  18. Menopause  -     XR DEXA BONE DENSITOMETRY (CPT=77080); Future; Expected date: 07/16/2025  -     Detailed, Mod Complex (85882)  19. Medication management  -     Atorvastatin Calcium; Take 1 tablet (10 mg total) by mouth nightly.  Dispense: 90 tablet; Refill: 1  -     Detailed, Mod Complex (93162)  20. Screening mammogram for breast cancer  -     [facility] LUTHER 2D+3D SCREENING BILAT (CPT=77067/77657); Future; Expected date: 08/27/2025  -     Detailed, Mod Complex (32912)  21. Vaccine counseling  -     Detailed, Mod Complex (55906)    Assessment & Plan  Dyslipidemia  LDL cholesterol at 71 mg/dL, above target for vascular heart disease. Discussed increasing atorvastatin to 20 mg. Prefers reassessment in January.  - Continue atorvastatin 10 mg orally nightly.  - Re-evaluate atorvastatin dosage in January.    General Health Maintenance  Vaccinations and screenings current. Discussed COVID-19 booster for October. Regular physical activity maintained. Discussed food journaling and portion control.  - Administer flu and COVID-19 vaccines in the fall.  - Ensure vaccinations remain up to date.  - Continue regular physical activity and consider reducing carbohydrate intake for weight management.    The patient indicates understanding of these issues and agrees to the plan.  Reinforced healthy diet, lifestyle, and exercise.      1. Encounter for annual health examination  Done today.    2. Essential hypertension  Stable; on spironolactone  - spironolactone 25 MG Oral Tab; Take 1 tablet (25 mg total) by mouth daily.  Dispense: 90 tablet; Refill: 1  - Detailed, Mod Complex (85742)  - Comp Metabolic Panel (14); Future    3. Environmental allergies  Stable; CPM  - Detailed, Mod  Complex (51195)    4. Gastroesophageal reflux disease, unspecified whether esophagitis present  Stable cimetidine  - cimetidine 300 MG Oral Tab; Take 2 tablets (600 mg total) by mouth 2 (two) times daily.  Dispense: 360 tablet; Refill: 1  - Detailed, Mod Complex (99214)    5. Osteopenia, unspecified location  Stable; dexa ordered  - Detailed, Mod Complex (71373)    6. Coronary artery disease involving native coronary artery of native heart without angina pectoris  Stable on atorvastatin.  Deferred statin increase.  - atorvastatin 10 MG Oral Tab; Take 1 tablet (10 mg total) by mouth nightly.  Dispense: 90 tablet; Refill: 1  - Detailed, Mod Complex (99214)  - Lipid Panel; Future    7. Dyslipidemia  Stable on atorvastatin.  Deferred statin increase.  - atorvastatin 10 MG Oral Tab; Take 1 tablet (10 mg total) by mouth nightly.  Dispense: 90 tablet; Refill: 1  - Detailed, Mod Complex (99214)  - Lipid Panel; Future    8. Mild intermittent asthma without complication (HCC)  Stable; CPM  - Detailed, Mod Complex (09506)    9. Ulcerative colitis without complications, unspecified location (HCC)  Stable; CPM  - Detailed, Mod Complex (78361)    10. Hyperglycemia  Stable; CPM  - Detailed, Mod Complex (10626)    11. Vitamin D deficiency  Stable; CPM  - Detailed, Mod Complex (43591)    12. History of cold sores  Stable CPM  - Detailed, Mod Complex (63383)    13. Granuloma annulare  Stable CPM  - Detailed, Mod Complex (76268)    14. Hiatal hernia  Stable; CPM  - Detailed, Mod Complex (24458)    16. History of herpes genitalis  Stable; CPM  - Detailed, Mod Complex (97464)    17. HSV infection  Stable; CPM  - Detailed, Mod Complex (37820)    18. Menopause  Dexa ordered.  - XR DEXA BONE DENSITOMETRY (CPT=77080); Future  - Detailed, Mod Complex (18316)    19. Medication management  Meds ordered.  - atorvastatin 10 MG Oral Tab; Take 1 tablet (10 mg total) by mouth nightly.  Dispense: 90 tablet; Refill: 1  - Detailed, Mod Complex  (33867)    20. Screening mammogram for breast cancer  Mammo ordered.  - [facility] LUTHER 2D+3D SCREENING BILAT (CPT=77067/43494); Future  - Detailed, Mod Complex (14388)    21. Vaccine counseling  Reviewed.  Advised COVID in the fall.  Will try to get Tdap that she got in 5/2025.  - Detailed, Mod Complex (33319)        Return in 6 months (on 1/15/2026) for med check 30.     Lexie Alexander DO, 7/15/2025     Supplementary Documentation:   General Health:  In the past six months, have you lost more than 10 pounds without trying?: (Patient-Rptd) 2 - No  Has your appetite been poor?: (Patient-Rptd) No  Type of Diet: (Patient-Rptd) Balanced  How does the patient maintain a good energy level?: (Patient-Rptd) Daily Walks, Stretching, Other  How would you describe your daily physical activity?: (Patient-Rptd) Moderate  How would you describe your current health state?: (Patient-Rptd) Good  How do you maintain positive mental well-being?: (Patient-Rptd) Social Interaction, Games, Visiting Friends, Visiting Family  On a scale of 0 to 10, with 0 being no pain and 10 being severe pain, what is your pain level?: (Patient-Rptd) 0 - (None)  In the past six months, have you experienced urine leakage?: (Patient-Rptd) 0-No  At any time do you feel concerned for the safety/well-being of yourself and/or your children, in your home or elsewhere?: (Patient-Rptd) No  Have you had any immunizations at another office such as Influenza, Hepatitis B, Tetanus, or Pneumococcal?: (Patient-Rptd) Yes    Health Maintenance   Topic Date Due    Annual Well Visit  01/01/2025    COVID-19 Vaccine (8 - 2024-25 season) 04/23/2025    Mammogram  08/26/2025    Influenza Vaccine (1) 10/01/2025    Colorectal Cancer Screening  07/12/2027    DEXA Scan  Completed    Annual Depression Screening  Completed    Fall Risk Screening (Annual)  Completed    Pneumococcal Vaccine: 50+ Years  Completed    Zoster Vaccines  Completed    Meningococcal B Vaccine  Aged Out

## 2025-07-15 NOTE — PROGRESS NOTES
The following individual(s) verbally consented to be recorded using ambient AI listening technology and understand that they can each withdraw their consent to this listening technology at any point by asking the clinician to turn off or pause the recording:    Patient name: Jennifer Landeros

## 2025-07-26 ENCOUNTER — HOSPITAL ENCOUNTER (OUTPATIENT)
Age: 70
Discharge: HOME | End: 2025-07-26
Payer: MEDICARE

## 2025-07-26 ENCOUNTER — LAB ENCOUNTER (OUTPATIENT)
Dept: LAB | Facility: HOSPITAL | Age: 70
End: 2025-07-26
Attending: FAMILY MEDICINE
Payer: MEDICARE

## 2025-07-26 VITALS
HEART RATE: 75 BPM | OXYGEN SATURATION: 97 % | RESPIRATION RATE: 18 BRPM | TEMPERATURE: 98 F | DIASTOLIC BLOOD PRESSURE: 74 MMHG | SYSTOLIC BLOOD PRESSURE: 161 MMHG

## 2025-07-26 DIAGNOSIS — R19.7 DIARRHEA, UNSPECIFIED TYPE: Primary | ICD-10-CM

## 2025-07-26 DIAGNOSIS — R19.7 DIARRHEA, UNSPECIFIED TYPE: ICD-10-CM

## 2025-07-26 LAB
CRYPTOSP AG STL QL IA: NEGATIVE
G LAMBLIA AG STL QL IA: NEGATIVE

## 2025-07-26 PROCEDURE — 87329 GIARDIA AG IA: CPT

## 2025-07-26 PROCEDURE — 87272 CRYPTOSPORIDIUM AG IF: CPT

## 2025-07-26 PROCEDURE — 99213 OFFICE O/P EST LOW 20 MIN: CPT | Performed by: NURSE PRACTITIONER

## 2025-07-26 NOTE — ED INITIAL ASSESSMENT (HPI)
Hx of Colitis - under control.  Hyperactive bowel sounds during the night.  Small loose stool this morning.  Normal BM yesterday.  Dog recently had giardia.  Pt watching her 5 week old grandchild.  Denies N/V/D or abd pain.  No UTI s/s.

## 2025-07-26 NOTE — ED PROVIDER NOTES
Patient Seen in: [facility]       The following individual(s) verbally consented to be recorded using ambient AI listening technology and understand that they can each withdraw their consent to this listening technology at any point by asking the clinician to turn off or pause the recording:    Patient name: Jennifer Landeros  Additional names:        History  Chief Complaint   Patient presents with    Diarrhea     Possibly exposed to gardia - Entered by patient     Stated Complaint: Diarrhea - Possibly exposed to gardia    Subjective:   HPI     Jennifer Landeros is a 70 year old female with colitis who presents with concerns about potential Giardia infection.    She has experienced one episode of loose stools and is concerned about louise Giardia from her dog, who was recently diagnosed with the infection. Given her history of colitis, she is particularly cautious about the potential impact of a Giardia infection.    Her dog was diagnosed with Giardia on Wednesday. She is worried about the risk of transmission, especially since she watches her granddaughter. She practices good hygiene, including hand washing and using hand , particularly when changing her granddaughter. She uses a bag to  her dog's stool to avoid direct contact.    She is unsure about the transmission of Giardia and mentions that her vet indicated it requires 'butt to mouth' contact, which she believes she has not done. Her dog, an older dog, likely contracted the infection from eating grass or other contaminated material during walks in the neighborhood.    She is concerned about the impact of a potential Giardia infection on her colitis.        Objective:     Past Medical History:    Anemia     delivery, without mention of indication, unspecified as to episode of care(669.70)    Elevated blood pressure reading without diagnosis of hypertension    Esophageal reflux    Palpitations    Personal  history of urinary (tract) infection    Unspecified asthma(493.90)    Unspecified sinusitis (chronic)              Past Surgical History:   Procedure Laterality Date          Colonoscopy  05/14/10;     due every 2 years per Dr. Luo    Colonoscopy  7/15    Kvng biopsy stereo nodule 1 site right (cpt=19081)   ?    benign     Tonsillectomy  1965                Social History     Socioeconomic History    Marital status:    Tobacco Use    Smoking status: Never    Smokeless tobacco: Never   Vaping Use    Vaping status: Never Used   Substance and Sexual Activity    Alcohol use: Yes     Alcohol/week: 0.0 standard drinks of alcohol     Comment: wine 3 nights a week     Drug use: No    Sexual activity: Yes   Other Topics Concern    Caffeine Concern Yes     Comment: coffee in am     Exercise Yes     Comment: daily               Review of Systems   All other systems reviewed and are negative.      Positive for stated complaint: Diarrhea - Possibly exposed to gardia  Other systems are as noted in HPI.  Constitutional and vital signs reviewed.      All other systems reviewed and negative except as noted above.                  Physical Exam    ED Triage Vitals [25 0848]   BP (!) 161/74   Pulse 75   Resp 18   Temp 98.3 °F (36.8 °C)   Temp src Oral   SpO2 97 %   O2 Device None (Room air)       Current Vitals:   Vital Signs  BP: (!) 161/74  Pulse: 75  Resp: 18  Temp: 98.3 °F (36.8 °C)  Temp src: Oral    Oxygen Therapy  SpO2: 97 %  O2 Device: None (Room air)            Physical Exam  Vitals and nursing note reviewed.   Constitutional:       General: She is not in acute distress.     Appearance: She is well-developed. She is not ill-appearing or toxic-appearing.   Cardiovascular:      Rate and Rhythm: Normal rate and regular rhythm.      Heart sounds: Normal heart sounds.   Pulmonary:      Effort: Pulmonary effort is normal.      Breath sounds: Normal breath sounds.   Skin:     General: Skin is  warm and dry.   Neurological:      Mental Status: She is alert and oriented to person, place, and time.                 ED Course  Labs Reviewed - No data to display                             Medical Decision Making  71 yo with diarrhea. Hx of colitis.    Differential diagnosis: colitis, diarrhea, infectious diarrhea, giardia    Stool will be sent for giardia. Pt diagnosed with diarrhea. No medications given. No abd tenderness. VSS.    Disposition and Plan     Clinical Impression:  1. Diarrhea, unspecified type         Disposition:  Discharge  7/26/2025  9:23 am    Follow-up:  No follow-up provider specified.        Medications Prescribed:  Current Discharge Medication List                Supplementary Documentation:

## 2025-08-26 ENCOUNTER — PATIENT MESSAGE (OUTPATIENT)
Dept: FAMILY MEDICINE CLINIC | Facility: CLINIC | Age: 70
End: 2025-08-26

## 2025-08-26 DIAGNOSIS — Z51.89 THERAPY: Primary | ICD-10-CM

## (undated) DIAGNOSIS — K21.9 GASTROESOPHAGEAL REFLUX DISEASE, UNSPECIFIED WHETHER ESOPHAGITIS PRESENT: Primary | ICD-10-CM

## (undated) NOTE — LETTER
ASTHMA ACTION PLAN for Norbert Mejia     : 1955     Date: 2023  Provider:  Wilfred Curry DO  Phone for doctor or clinic: [facility], [address], [address]  [place] [id number]    ACT Score: 25      You can use the colors of a traffic light to help learn about your asthma medicines. 1. Green - Go! % of Personal Best Peak Flow Use controller medicine. Breathing is good  No cough or wheeze  Can work and play Medicine How much to take When to take it    Spironolactone 25 mg- take one tablet by mouth once daily    Fexofenadine  mg- take one tablet by mouth daily      2. Yellow - Caution. 50-79% Personal Best Peak  Flow. Use reliever medicine to keep an asthma attack from getting bad. Cough  Wheezing  Tight Chest  Wake up at night Medicine How much to take When to take it    Albuterol inhaler,  2 puffs every four hours as needed. Additional instructions         3. Red - Stop! Danger!  <50% Personal Best Peak  Flow. Take these medications until  Get help from a doctor   Medicine not helping  Breathing is hard and fast  Nose opens wide  Can't walk  Ribs show  Can't talk well Medicine How much to take When to take it    Albuterol inhaler,  2 puffs every five minutes until symptoms resolve, if symptoms do not resolve call 911 or head to nearest emergency room. Additional Instructions If your symptoms do not improve and you cannot contact your doctor, [facility] or call 911 immediately! [x] Asthma Action Plan reviewed with patient (and caregiver if necessary) and a copy of the plan was given to the patient/caregiver. [] Asthma Action Plan reviewed with patient (and caregiver if necessary) on the phone and mailed copy to patient or submitted via [address]      Signatures:  Provider  Wilfred Curry DO   Patient Caretaker

## (undated) NOTE — LETTER
ASTHMA ACTION PLAN for Dhaval Leo     : 1955     Date: 2019  Provider:  Osmel Sprague DO  Phone for doctor or clinic: [facility], [address] [address] [phone]

## (undated) NOTE — LETTER
06/18/21        [address] [place] [address]      Dear Annabella Simon records indicate that you have outstanding lab work and or testing that was ordered for you and has not yet been completed:  Orders Placed This Encounter

## (undated) NOTE — LETTER
ASTHMA ACTION PLAN for Jennifer Landeros     : 1955     Date: 2025  Provider:  Lexie Alexander DO  Phone for doctor or clinic: [facility], [facility], [address] [address]  [phone]    ACT Score: 25      You can use the colors of a traffic light to help learn about your asthma medicines.      1. Green - Go! % of Personal Best Peak Flow Use controller medicine.   Breathing is good  No cough or wheeze  Can work and play Medicine How much to take When to take it    Fexofenadine  mg- take one tablet by mouth daily as needed.      2. Yellow - Caution. 50-79% Personal Best Peak  Flow.  Use reliever medicine to keep an asthma attack from getting bad.   Cough  Wheezing  Tight Chest  Wake up at night Medicine How much to take When to take it    Albuterol inhaler,  2 puffs every four hours as needed.        Additional instructions         3. Red - Stop! Danger!  <50% Personal Best Peak  Flow. Take these medications until  Get help from a doctor   Medicine not helping  Breathing is hard and fast  Nose opens wide  Can't walk  Ribs show  Can't talk well Medicine How much to take When to take it    Albuterol inhaler,  2 puffs every five minutes until symptoms resolve,if symptoms do not resolve call 911 or head to the nearest emergency room.    DO NOT DRIVE YOURSELF!     Additional Instructions If your symptoms do not improve and you cannot contact your doctor[facility] or call [facility] immediately!     [x] Asthma Action Plan reviewed with patient (and caregiver if necessary) and a copy of the plan was given to the patient/caregiver.   [] Asthma Action Plan reviewed with patient (and caregiver if necessary) on the phone and mailed copy to patient or submitted via [employer].     Signatures:  Provider  Lexie Alexander DO   Patient Caretaker

## (undated) NOTE — LETTER
ASTHMA ACTION PLAN for Didier Durant     : 1955     Date: 2017  Provider:  Veronica Ramires DO  Phone for doctor or clinic: [facility], [facility], [address] [phone]    ACT

## (undated) NOTE — LETTER
ASTHMA ACTION PLAN for Maria Fernanda Sorto     : 1955     Date: 10/1/2019  Provider:  Harshad Holly DO  Phone for doctor or clinic: [address] [facility], [address], [address] [facility] [address], [address] [place] [phone]

## (undated) NOTE — LETTER
ASTHMA ACTION PLAN for [facility]     : 1955     Date: 2021  Provider:  Елена Thurston DO  Phone for doctor or clinic: [address] [facility], [address], [address] [facility] [address], [address] [facility]  [place] [phone]    ACT

## (undated) NOTE — LETTER
ASTHMA ACTION PLAN for Tom Gramajo     : 1955     Date: 3/23/2020  Provider:  BOBBY Narvaez  Phone for doctor or clinic: [facility], [address], [address] [facility] [address], [address] [address]  [phone]

## (undated) NOTE — LETTER
ASTHMA ACTION PLAN for Jennifer Landeros     : 1955     Date: 2024  Provider:  Lexie Alexander DO  Phone for doctor or clinic: [place], [facility], [address] [address]  [phone]    ACT Score: 25      You can use the colors of a traffic light to help learn about your asthma medicines.      1. Green - Go! % of Personal Best Peak Flow Use controller medicine.   Breathing is good  No cough or wheeze  Can work and play Medicine How much to take When to take it    Fexofenadine  mg- take one tablet by mouth daily      2. Yellow - Caution. 50-79% Personal Best Peak  Flow.  Use reliever medicine to keep an asthma attack from getting bad.   Cough  Wheezing  Tight Chest  Wake up at night Medicine How much to take When to take it    Albuterol inhaler,  2 puffs every four hours as needed.        Additional instructions         3. Red - Stop! Danger!  <50% Personal Best Peak  Flow. Take these medications until  Get help from a doctor   Medicine not helping  Breathing is hard and fast  Nose opens wide  Can't walk  Ribs show  Can't talk well Medicine How much to take When to take it    Albuterol inhaler,  2 puffs every five minutes until symptoms resolve, if symptoms do not resolve call 911 or head to the nearest emergency room.    DO NOT DRIVE YOURSELF      Additional Instructions If your symptoms do not improve and you cannot contact your doctor[facility] or call [facility] immediately!     [x] Asthma Action Plan reviewed with patient (and caregiver if necessary) and a copy of the plan was given to the patient/caregiver.   [] Asthma Action Plan reviewed with patient (and caregiver if necessary) on the phone and mailed copy to patient or submitted via [employer].     Signatures:  Provider  Lexie Alexander DO   Patient Caretaker